# Patient Record
Sex: FEMALE | Race: WHITE | NOT HISPANIC OR LATINO | Employment: OTHER | RURAL
[De-identification: names, ages, dates, MRNs, and addresses within clinical notes are randomized per-mention and may not be internally consistent; named-entity substitution may affect disease eponyms.]

---

## 2017-03-16 ENCOUNTER — HISTORICAL (OUTPATIENT)
Dept: ADMINISTRATIVE | Facility: HOSPITAL | Age: 37
End: 2017-03-16

## 2017-03-20 LAB
LAB AP CLINICAL INFORMATION: NORMAL
LAB AP GENERAL CAT - HISTORICAL: NORMAL
LAB AP INTERPRETATION/RESULT - HISTORICAL: NEGATIVE
LAB AP SPECIMEN ADEQUACY - HISTORICAL: NORMAL
LAB AP SPECIMEN SUBMITTED - HISTORICAL: NORMAL

## 2022-10-07 ENCOUNTER — OFFICE VISIT (OUTPATIENT)
Dept: FAMILY MEDICINE | Facility: CLINIC | Age: 42
End: 2022-10-07
Payer: OTHER GOVERNMENT

## 2022-10-07 VITALS
SYSTOLIC BLOOD PRESSURE: 125 MMHG | BODY MASS INDEX: 36.04 KG/M2 | OXYGEN SATURATION: 97 % | HEART RATE: 103 BPM | RESPIRATION RATE: 18 BRPM | WEIGHT: 237.81 LBS | HEIGHT: 68 IN | DIASTOLIC BLOOD PRESSURE: 68 MMHG

## 2022-10-07 DIAGNOSIS — M48.00 SPINAL STENOSIS, UNSPECIFIED SPINAL REGION: Primary | ICD-10-CM

## 2022-10-07 DIAGNOSIS — M48.07 SPINAL STENOSIS, LUMBOSACRAL REGION: ICD-10-CM

## 2022-10-07 PROCEDURE — 96372 THER/PROPH/DIAG INJ SC/IM: CPT | Mod: ,,, | Performed by: FAMILY MEDICINE

## 2022-10-07 PROCEDURE — 99213 PR OFFICE/OUTPT VISIT, EST, LEVL III, 20-29 MIN: ICD-10-PCS | Mod: 25,,, | Performed by: FAMILY MEDICINE

## 2022-10-07 PROCEDURE — 96372 PR INJECTION,THERAP/PROPH/DIAG2ST, IM OR SUBCUT: ICD-10-PCS | Mod: ,,, | Performed by: FAMILY MEDICINE

## 2022-10-07 PROCEDURE — 99213 OFFICE O/P EST LOW 20 MIN: CPT | Mod: 25,,, | Performed by: FAMILY MEDICINE

## 2022-10-07 RX ORDER — DEXAMETHASONE SODIUM PHOSPHATE 4 MG/ML
8 INJECTION, SOLUTION INTRA-ARTICULAR; INTRALESIONAL; INTRAMUSCULAR; INTRAVENOUS; SOFT TISSUE
Status: COMPLETED | OUTPATIENT
Start: 2022-10-07 | End: 2022-10-07

## 2022-10-07 RX ADMIN — DEXAMETHASONE SODIUM PHOSPHATE 8 MG: 4 INJECTION, SOLUTION INTRA-ARTICULAR; INTRALESIONAL; INTRAMUSCULAR; INTRAVENOUS; SOFT TISSUE at 10:10

## 2022-10-07 NOTE — PROGRESS NOTES
Jamari Stout MD        PATIENT NAME: Kaylene Pryor  : 1980  DATE: 10/7/22  MRN: [id number]      Billing Provider: Jamari Stout MD  Level of Service: [place] OFFICE/OUTPT VISIT, EST, LEVL III, 20-29 MIN  Patient PCP Information       Provider PCP Type    Jamari Stout MD General            Reason for Visit / Chief Complaint: Back Pain (Patient want an MRI referral/Also Dr. Cates wants her to have a steroid shot)       Update PCP  Update Chief Complaint         History of Present Illness / Problem Focused Workflow     Kaylene Pryor presents to the clinic with Back Pain (Patient want an MRI referral/Also Dr. Cates wants her to have a steroid shot)     Long hx low back pain.  Feels better bending forward either sitting or standing.      Back Pain  This is a recurrent problem. The current episode started 1 to 4 weeks ago. The problem occurs constantly. The problem has been gradually worsening since onset. The pain is present in the gluteal, lumbar spine and sacro-iliac. The quality of the pain is described as shooting and stabbing. The pain radiates to the left thigh and right thigh. The pain is at a severity of 7/10. The pain is severe. The pain is Worse during the day. The symptoms are aggravated by bending, coughing, position, sitting, standing and twisting. Stiffness is present All day. Associated symptoms include headaches, leg pain, pelvic pain and weakness. Pertinent negatives include no abdominal pain, bladder incontinence, bowel incontinence, chest pain, dysuria, fever, numbness, paresis, paresthesias, perianal numbness, tingling or weight loss. Risk factors include lack of exercise, obesity and poor posture. The treatment provided mild relief.     Review of Systems     Review of Systems   Constitutional:  Negative for activity change, appetite change, fever, unexpected weight change and weight loss.   HENT:  Negative for congestion, rhinorrhea, sinus pressure, sinus pain, sore throat and trouble  swallowing.    Eyes:  Negative for photophobia, pain, discharge and visual disturbance.   Respiratory:  Negative for cough, chest tightness, wheezing and stridor.    Cardiovascular:  Negative for chest pain, palpitations and leg swelling.   Gastrointestinal:  Negative for abdominal pain, blood in stool, bowel incontinence, constipation, diarrhea and nausea.   Endocrine: Negative for polydipsia, polyphagia and polyuria.   Genitourinary:  Positive for pelvic pain. Negative for bladder incontinence, difficulty urinating, dysuria, flank pain and hematuria.   Musculoskeletal:  Positive for back pain. Negative for arthralgias and neck pain.   Skin:  Negative for rash.   Allergic/Immunologic: Negative for food allergies.   Neurological:  Positive for weakness and headaches. Negative for dizziness, tingling, tremors, seizures, syncope, numbness and paresthesias.   Psychiatric/Behavioral:  Negative for behavioral problems, confusion, decreased concentration, dysphoric mood and hallucinations. The patient is not nervous/anxious.       Medical / Social / Family History   History reviewed. No pertinent past medical history.    History reviewed. No pertinent surgical history.    Social History  Ms.      Family History  Ms.'s family history is not on file.    Medications and Allergies     Medications  No outpatient medications have been marked as taking for the 10/7/22 encounter (Office Visit) with Jamari Stout MD.       Allergies  Review of patient's allergies indicates:  No Known Allergies    Physical Examination     Vitals:    10/07/22 0951   BP: 125/68   Pulse: 103   Resp: 18     Physical Exam  Constitutional:       General: She is not in acute distress.     Appearance: Normal appearance.   HENT:      Head: Normocephalic.      Right Ear: Tympanic membrane and ear canal normal.      Left Ear: Tympanic membrane and ear canal normal.      Nose: Nose normal.      Mouth/Throat:      Mouth: Mucous membranes are moist.       Pharynx: No oropharyngeal exudate.   Eyes:      Extraocular Movements: Extraocular movements intact.      Pupils: Pupils are equal, round, and reactive to light.   Cardiovascular:      Rate and Rhythm: Normal rate and regular rhythm.      Heart sounds: No murmur heard.  Pulmonary:      Effort: Pulmonary effort is normal.      Breath sounds: Normal breath sounds. No wheezing.   Abdominal:      General: Abdomen is flat. Bowel sounds are normal.      Palpations: Abdomen is soft.      Hernia: No hernia is present.   Musculoskeletal:         General: Normal range of motion.      Cervical back: Normal range of motion and neck supple.      Right lower leg: No edema.      Left lower leg: No edema.      Comments: Arises stiffly from a seated position.  Prefers the kyphosis position   Lymphadenopathy:      Cervical: No cervical adenopathy.   Skin:     General: Skin is warm and dry.      Coloration: Skin is not jaundiced.      Findings: No lesion.   Neurological:      General: No focal deficit present.      Mental Status: She is alert and oriented to person, place, and time.      Cranial Nerves: No cranial nerve deficit.      Gait: Gait normal.   Psychiatric:         Mood and Affect: Mood normal.         Behavior: Behavior normal.         Judgment: Judgment normal.        Assessment and Plan (including Health Maintenance)      Problem List  Smart Sets  Document Outside HM   :    Plan:   Spinal stenosis, unspecified spinal region  -     dexamethasone injection 8 mg  -     MRI Lumbar Spine Without Contrast; Future; Expected date: 10/07/2022    Spinal stenosis, lumbosacral region  -     MRI Lumbar Spine Without Contrast; Future; Expected date: 10/07/2022  -     Ambulatory referral/consult to Neurosurgery; Future; Expected date: 10/21/2022         Health Maintenance Due   Topic Date Due    Hepatitis C Screening  Never done    Cervical Cancer Screening  Never done    Lipid Panel  Never done    COVID-19 Vaccine (1) Never done    HIV  Screening  Never done    TETANUS VACCINE  Never done    Mammogram  Never done    Influenza Vaccine (1) Never done       Problem List Items Addressed This Visit    None  Visit Diagnoses       Spinal stenosis, unspecified spinal region    -  Primary    Relevant Medications    dexamethasone injection 8 mg (Completed)    Other Relevant Orders    MRI Lumbar Spine Without Contrast    Spinal stenosis, lumbosacral region        Relevant Orders    MRI Lumbar Spine Without Contrast    Ambulatory referral/consult to Neurosurgery            The patient has no Health Maintenance topics of status Not Due    Future Appointments   Date Time Provider Department Center   10/21/2022  2:30 PM [facility] MRI[address] [place] [address]        There are no Patient Instructions on file for this visit.  Follow up if symptoms worsen or fail to improve.     Signature:  Jamari Stout MD      Date of encounter: 10/7/22    Answers submitted by the patient for this visit:  Back Pain Questionnaire (Submitted on 10/6/2022)  Chief Complaint: Back pain  genital pain: No

## 2023-07-11 ENCOUNTER — OFFICE VISIT (OUTPATIENT)
Dept: FAMILY MEDICINE | Facility: CLINIC | Age: 43
End: 2023-07-11
Payer: OTHER GOVERNMENT

## 2023-07-11 VITALS
DIASTOLIC BLOOD PRESSURE: 100 MMHG | SYSTOLIC BLOOD PRESSURE: 150 MMHG | HEIGHT: 68 IN | WEIGHT: 250 LBS | BODY MASS INDEX: 37.89 KG/M2 | HEART RATE: 84 BPM | RESPIRATION RATE: 18 BRPM | OXYGEN SATURATION: 96 %

## 2023-07-11 DIAGNOSIS — Z78.0 MENOPAUSE: ICD-10-CM

## 2023-07-11 DIAGNOSIS — Z00.00 ROUTINE GENERAL MEDICAL EXAMINATION AT A HEALTH CARE FACILITY: ICD-10-CM

## 2023-07-11 DIAGNOSIS — I10 PRIMARY HYPERTENSION: Primary | ICD-10-CM

## 2023-07-11 DIAGNOSIS — Z12.39 BREAST SCREENING: ICD-10-CM

## 2023-07-11 LAB
ALBUMIN SERPL BCP-MCNC: 3.7 G/DL (ref 3.5–5)
ALBUMIN/GLOB SERPL: 0.9 {RATIO}
ALP SERPL-CCNC: 47 U/L (ref 37–98)
ALT SERPL W P-5'-P-CCNC: 52 U/L (ref 13–56)
ANION GAP SERPL CALCULATED.3IONS-SCNC: 11 MMOL/L (ref 7–16)
AST SERPL W P-5'-P-CCNC: 36 U/L (ref 15–37)
BASOPHILS # BLD AUTO: 0.09 K/UL (ref 0–0.2)
BASOPHILS NFR BLD AUTO: 0.8 % (ref 0–1)
BILIRUB SERPL-MCNC: 0.6 MG/DL (ref ?–1.2)
BUN SERPL-MCNC: 12 MG/DL (ref 7–18)
BUN/CREAT SERPL: 18 (ref 6–20)
CALCIUM SERPL-MCNC: 9.2 MG/DL (ref 8.5–10.1)
CHLORIDE SERPL-SCNC: 102 MMOL/L (ref 98–107)
CHOLEST SERPL-MCNC: 157 MG/DL (ref 0–200)
CHOLEST/HDLC SERPL: 4.1 {RATIO}
CO2 SERPL-SCNC: 28 MMOL/L (ref 21–32)
CREAT SERPL-MCNC: 0.68 MG/DL (ref 0.55–1.02)
DIFFERENTIAL METHOD BLD: ABNORMAL
EGFR (NO RACE VARIABLE) (RUSH/TITUS): 110 ML/MIN/1.73M2
EOSINOPHIL # BLD AUTO: 0.89 K/UL (ref 0–0.5)
EOSINOPHIL NFR BLD AUTO: 7.5 % (ref 1–4)
ERYTHROCYTE [DISTWIDTH] IN BLOOD BY AUTOMATED COUNT: 14.5 % (ref 11.5–14.5)
ESTRADIOL SERPL-MCNC: 254.4 PG/ML
FSH SERPL-ACNC: 0.8 MIU/ML (ref 1.7–134.8)
GLOBULIN SER-MCNC: 3.9 G/DL (ref 2–4)
GLUCOSE SERPL-MCNC: 90 MG/DL (ref 74–106)
HCT VFR BLD AUTO: 44 % (ref 38–47)
HDLC SERPL-MCNC: 38 MG/DL (ref 40–60)
HGB BLD-MCNC: 14.1 G/DL (ref 12–16)
IMM GRANULOCYTES # BLD AUTO: 0.03 K/UL (ref 0–0.04)
IMM GRANULOCYTES NFR BLD: 0.3 % (ref 0–0.4)
LDLC SERPL CALC-MCNC: 88 MG/DL
LDLC/HDLC SERPL: 2.3 {RATIO}
LH SERPL-ACNC: 2.5 MIU/ML
LYMPHOCYTES # BLD AUTO: 2.65 K/UL (ref 1–4.8)
LYMPHOCYTES NFR BLD AUTO: 22.2 % (ref 27–41)
MCH RBC QN AUTO: 28.6 PG (ref 27–31)
MCHC RBC AUTO-ENTMCNC: 32 G/DL (ref 32–36)
MCV RBC AUTO: 89.2 FL (ref 80–96)
MONOCYTES # BLD AUTO: 0.95 K/UL (ref 0–0.8)
MONOCYTES NFR BLD AUTO: 8 % (ref 2–6)
MPC BLD CALC-MCNC: 10.8 FL (ref 9.4–12.4)
NEUTROPHILS # BLD AUTO: 7.32 K/UL (ref 1.8–7.7)
NEUTROPHILS NFR BLD AUTO: 61.2 % (ref 53–65)
NONHDLC SERPL-MCNC: 119 MG/DL
NRBC # BLD AUTO: 0 X10E3/UL
NRBC, AUTO (.00): 0 %
PLATELET # BLD AUTO: 296 K/UL (ref 150–400)
POTASSIUM SERPL-SCNC: 3.8 MMOL/L (ref 3.5–5.1)
PROT SERPL-MCNC: 7.6 G/DL (ref 6.4–8.2)
RBC # BLD AUTO: 4.93 M/UL (ref 4.2–5.4)
SODIUM SERPL-SCNC: 137 MMOL/L (ref 136–145)
TRIGL SERPL-MCNC: 157 MG/DL (ref 35–150)
VLDLC SERPL-MCNC: 31 MG/DL
WBC # BLD AUTO: 11.93 K/UL (ref 4.5–11)

## 2023-07-11 PROCEDURE — 80053 COMPREHENSIVE METABOLIC PANEL: ICD-10-PCS | Mod: ,,, | Performed by: CLINICAL MEDICAL LABORATORY

## 2023-07-11 PROCEDURE — 99396 PR PREVENTIVE VISIT,EST,40-64: ICD-10-PCS | Mod: ,,, | Performed by: FAMILY MEDICINE

## 2023-07-11 PROCEDURE — 82670 ESTRADIOL: ICD-10-PCS | Mod: ,,, | Performed by: CLINICAL MEDICAL LABORATORY

## 2023-07-11 PROCEDURE — 80061 LIPID PANEL: ICD-10-PCS | Mod: ,,, | Performed by: CLINICAL MEDICAL LABORATORY

## 2023-07-11 PROCEDURE — 80053 COMPREHEN METABOLIC PANEL: CPT | Mod: ,,, | Performed by: CLINICAL MEDICAL LABORATORY

## 2023-07-11 PROCEDURE — 83002 ASSAY OF GONADOTROPIN (LH): CPT | Mod: ,,, | Performed by: CLINICAL MEDICAL LABORATORY

## 2023-07-11 PROCEDURE — 82670 ASSAY OF TOTAL ESTRADIOL: CPT | Mod: ,,, | Performed by: CLINICAL MEDICAL LABORATORY

## 2023-07-11 PROCEDURE — 83001 FOLLICLE STIMULATING HORMONE: ICD-10-PCS | Mod: ,,, | Performed by: CLINICAL MEDICAL LABORATORY

## 2023-07-11 PROCEDURE — 83001 ASSAY OF GONADOTROPIN (FSH): CPT | Mod: ,,, | Performed by: CLINICAL MEDICAL LABORATORY

## 2023-07-11 PROCEDURE — 80061 LIPID PANEL: CPT | Mod: ,,, | Performed by: CLINICAL MEDICAL LABORATORY

## 2023-07-11 PROCEDURE — 85025 COMPLETE CBC W/AUTO DIFF WBC: CPT | Mod: ,,, | Performed by: CLINICAL MEDICAL LABORATORY

## 2023-07-11 PROCEDURE — 83002 LUTEINIZING HORMONE: ICD-10-PCS | Mod: ,,, | Performed by: CLINICAL MEDICAL LABORATORY

## 2023-07-11 PROCEDURE — 85025 CBC WITH DIFFERENTIAL: ICD-10-PCS | Mod: ,,, | Performed by: CLINICAL MEDICAL LABORATORY

## 2023-07-11 PROCEDURE — 99396 PREV VISIT EST AGE 40-64: CPT | Mod: ,,, | Performed by: FAMILY MEDICINE

## 2023-07-11 RX ORDER — LISINOPRIL 10 MG/1
10 TABLET ORAL DAILY
Qty: 90 TABLET | Refills: 3 | Status: SHIPPED | OUTPATIENT
Start: 2023-07-11 | End: 2023-07-28 | Stop reason: ALTCHOICE

## 2023-07-11 NOTE — PROGRESS NOTES
Jamari Stout MD        PATIENT NAME: Kaylene Pryor  : 1980  DATE: 23  MRN: [id number]      Billing Provider: Jamari Stout MD  Level of Service: [place] OFFICE/OUTPT VISIT, EST, LEVL IV, 30-39 MIN  Patient PCP Information       Provider PCP Type    Jamari Stout MD General            Reason for Visit / Chief Complaint: Annual Exam (Patient states she has not seen a PCP in years and wanted to have labs done to see if she is having issues with hormones. Having irregular periods)       Update PCP  Update Chief Complaint         History of Present Illness / Problem Focused Workflow     Kaylene Pryor presents to the clinic with Annual Exam (Patient states she has not seen a PCP in years and wanted to have labs done to see if she is having issues with hormones. Having irregular periods)     Routine followup.  No significant interval change.  Hot flashes.      Review of Systems     Review of Systems   Constitutional:  Negative for activity change, appetite change, fever and unexpected weight change.   HENT:  Negative for congestion, hearing loss, rhinorrhea, sinus pressure, sinus pain, sore throat and trouble swallowing.    Eyes:  Negative for photophobia, pain, discharge and visual disturbance.   Respiratory:  Negative for cough, chest tightness, wheezing and stridor.    Cardiovascular:  Positive for palpitations. Negative for chest pain and leg swelling.   Gastrointestinal:  Positive for diarrhea. Negative for abdominal pain, blood in stool, constipation, nausea and vomiting.   Endocrine: Negative for polydipsia, polyphagia and polyuria.   Genitourinary:  Positive for menstrual problem. Negative for difficulty urinating, dysuria, flank pain and hematuria.   Musculoskeletal:  Positive for arthralgias and neck pain. Negative for joint swelling.   Skin:  Negative for rash.   Allergic/Immunologic: Negative for food allergies.   Neurological:  Positive for headaches. Negative for dizziness, tremors, seizures,  syncope and weakness.   Psychiatric/Behavioral:  Negative for behavioral problems, confusion, decreased concentration, dysphoric mood and hallucinations. The patient is not nervous/anxious.       Medical / Social / Family History   History reviewed. No pertinent past medical history.    History reviewed. No pertinent surgical history.    Social History  Ms.  reports that she has never smoked. She has never been exposed to tobacco smoke. She has never used smokeless tobacco. She reports that she does not currently use alcohol. She reports that she does not use drugs.    Family History  Ms.'s family history is not on file.    Medications and Allergies     Medications  No outpatient medications have been marked as taking for the 7/11/23 encounter (Office Visit) with Jamari Stout MD.       Allergies  Review of patient's allergies indicates:   Allergen Reactions    Opioids - morphine analogues        Physical Examination     Vitals:    07/11/23 1439   BP: (!) 150/100   Pulse:    Resp:      Physical Exam  Constitutional:       General: She is not in acute distress.     Appearance: Normal appearance.   HENT:      Head: Normocephalic.      Right Ear: Tympanic membrane and ear canal normal.      Left Ear: Tympanic membrane and ear canal normal.      Nose: Nose normal.      Mouth/Throat:      Mouth: Mucous membranes are moist.      Pharynx: No oropharyngeal exudate.   Eyes:      Extraocular Movements: Extraocular movements intact.      Pupils: Pupils are equal, round, and reactive to light.   Cardiovascular:      Rate and Rhythm: Normal rate and regular rhythm.      Heart sounds: No murmur heard.  Pulmonary:      Effort: Pulmonary effort is normal.      Breath sounds: Normal breath sounds. No wheezing.   Abdominal:      General: Abdomen is flat. Bowel sounds are normal.      Palpations: Abdomen is soft.      Hernia: No hernia is present.   Musculoskeletal:         General: Normal range of motion.      Cervical back:  Normal range of motion and neck supple.      Right lower leg: No edema.      Left lower leg: No edema.   Lymphadenopathy:      Cervical: No cervical adenopathy.   Skin:     General: Skin is warm and dry.      Coloration: Skin is not jaundiced.      Findings: No lesion.   Neurological:      General: No focal deficit present.      Mental Status: She is alert and oriented to person, place, and time.      Cranial Nerves: No cranial nerve deficit.      Gait: Gait normal.   Psychiatric:         Mood and Affect: Mood normal.         Behavior: Behavior normal.         Judgment: Judgment normal.        Assessment and Plan (including Health Maintenance)      Problem List  Smart Sets  Document Outside HM   :    Plan:     1. Routine general medical examination at a health care facility    -     CBC Auto Differential  -     Comprehensive Metabolic Panel  -     Lipid Panel          Health Maintenance Due   Topic Date Due    Cervical Cancer Screening  Never done    HIV Screening  Never done    TETANUS VACCINE  Never done    Mammogram  Never done       Problem List Items Addressed This Visit    None  Visit Diagnoses       Primary hypertension    -  Primary    Relevant Medications    lisinopriL 10 MG tablet    Routine general medical examination at a health care facility        Relevant Orders    CBC Auto Differential (Completed)    Comprehensive Metabolic Panel (Completed)    Lipid Panel (Completed)    Menopause        Relevant Orders    Follicle Stimulating Hormone (Completed)    Luteinizing Hormone (Completed)    Estradiol (Completed)    Breast screening        Relevant Orders    Mammo Digital Screening Bilat            Health Maintenance Topics with due status: Not Due       Topic Last Completion Date    Influenza Vaccine Not Due       Future Appointments   Date Time Provider Department Center   8/14/2023  2:00 PM Jamari Stout MD [facility]   8/21/2023 11:15 AM Day Abdi CNM [facility]    9/28/2023 12:45 PM [facility] [place]        There are no Patient Instructions on file for this visit.  Follow up in about 4 weeks (around 8/8/2023) for routine followup.     Signature:  Jamari Stout MD      Date of encounter: 7/11/23

## 2023-07-12 ENCOUNTER — PATIENT MESSAGE (OUTPATIENT)
Dept: FAMILY MEDICINE | Facility: CLINIC | Age: 43
End: 2023-07-12
Payer: OTHER GOVERNMENT

## 2023-07-20 ENCOUNTER — HOSPITAL ENCOUNTER (EMERGENCY)
Facility: HOSPITAL | Age: 43
Discharge: HOME | End: 2023-07-20
Payer: OTHER GOVERNMENT

## 2023-07-20 VITALS
DIASTOLIC BLOOD PRESSURE: 107 MMHG | SYSTOLIC BLOOD PRESSURE: 136 MMHG | TEMPERATURE: 98 F | WEIGHT: 250 LBS | HEART RATE: 90 BPM | BODY MASS INDEX: 37.89 KG/M2 | RESPIRATION RATE: 18 BRPM | HEIGHT: 68 IN | OXYGEN SATURATION: 100 %

## 2023-07-20 DIAGNOSIS — N83.201 RIGHT OVARIAN CYST: ICD-10-CM

## 2023-07-20 DIAGNOSIS — D25.9 UTERINE LEIOMYOMA, UNSPECIFIED LOCATION: ICD-10-CM

## 2023-07-20 DIAGNOSIS — R10.9 ABDOMINAL PAIN, UNSPECIFIED ABDOMINAL LOCATION: Primary | ICD-10-CM

## 2023-07-20 LAB
ALBUMIN SERPL BCP-MCNC: 3.3 G/DL (ref 3.5–5)
ALBUMIN/GLOB SERPL: 0.9 {RATIO}
ALP SERPL-CCNC: 60 U/L (ref 37–98)
ALT SERPL W P-5'-P-CCNC: 24 U/L (ref 13–56)
ANION GAP SERPL CALCULATED.3IONS-SCNC: 10 MMOL/L (ref 7–16)
AST SERPL W P-5'-P-CCNC: 14 U/L (ref 15–37)
BACTERIA #/AREA URNS HPF: ABNORMAL /HPF
BASOPHILS # BLD AUTO: 0.09 K/UL (ref 0–0.2)
BASOPHILS NFR BLD AUTO: 0.6 % (ref 0–1)
BILIRUB SERPL-MCNC: 0.3 MG/DL (ref ?–1.2)
BILIRUB UR QL STRIP: NEGATIVE
BUN SERPL-MCNC: 13 MG/DL (ref 7–18)
BUN/CREAT SERPL: 16 (ref 6–20)
CALCIUM SERPL-MCNC: 9.1 MG/DL (ref 8.5–10.1)
CHLORIDE SERPL-SCNC: 100 MMOL/L (ref 98–107)
CLARITY UR: CLEAR
CO2 SERPL-SCNC: 30 MMOL/L (ref 21–32)
COLOR UR: COLORLESS
CREAT SERPL-MCNC: 0.83 MG/DL (ref 0.55–1.02)
DIFFERENTIAL METHOD BLD: ABNORMAL
EGFR (NO RACE VARIABLE) (RUSH/TITUS): 89 ML/MIN/1.73M2
EOSINOPHIL # BLD AUTO: 0.47 K/UL (ref 0–0.5)
EOSINOPHIL NFR BLD AUTO: 3.2 % (ref 1–4)
ERYTHROCYTE [DISTWIDTH] IN BLOOD BY AUTOMATED COUNT: 13.6 % (ref 11.5–14.5)
GLOBULIN SER-MCNC: 3.6 G/DL (ref 2–4)
GLUCOSE SERPL-MCNC: 130 MG/DL (ref 74–106)
GLUCOSE UR STRIP-MCNC: NORMAL MG/DL
HCT VFR BLD AUTO: 41.5 % (ref 38–47)
HGB BLD-MCNC: 14.1 G/DL (ref 12–16)
IMM GRANULOCYTES # BLD AUTO: 0.06 K/UL (ref 0–0.04)
IMM GRANULOCYTES NFR BLD: 0.4 % (ref 0–0.4)
KETONES UR STRIP-SCNC: NEGATIVE MG/DL
LEUKOCYTE ESTERASE UR QL STRIP: ABNORMAL
LYMPHOCYTES # BLD AUTO: 1.91 K/UL (ref 1–4.8)
LYMPHOCYTES NFR BLD AUTO: 12.9 % (ref 27–41)
MCH RBC QN AUTO: 30 PG (ref 27–31)
MCHC RBC AUTO-ENTMCNC: 34 G/DL (ref 32–36)
MCV RBC AUTO: 88.3 FL (ref 80–96)
MONOCYTES # BLD AUTO: 0.71 K/UL (ref 0–0.8)
MONOCYTES NFR BLD AUTO: 4.8 % (ref 2–6)
MPC BLD CALC-MCNC: 10.3 FL (ref 9.4–12.4)
MUCOUS THREADS #/AREA URNS HPF: ABNORMAL /HPF
NEUTROPHILS # BLD AUTO: 11.59 K/UL (ref 1.8–7.7)
NEUTROPHILS NFR BLD AUTO: 78.1 % (ref 53–65)
NITRITE UR QL STRIP: NEGATIVE
NRBC # BLD AUTO: 0 X10E3/UL
NRBC, AUTO (.00): 0 %
PH UR STRIP: 7 PH UNITS
PLATELET # BLD AUTO: 255 K/UL (ref 150–400)
POTASSIUM SERPL-SCNC: 3.9 MMOL/L (ref 3.5–5.1)
PROT SERPL-MCNC: 6.9 G/DL (ref 6.4–8.2)
PROT UR QL STRIP: NEGATIVE
RBC # BLD AUTO: 4.7 M/UL (ref 4.2–5.4)
RBC # UR STRIP: ABNORMAL /UL
RBC #/AREA URNS HPF: ABNORMAL /HPF
SODIUM SERPL-SCNC: 136 MMOL/L (ref 136–145)
SP GR UR STRIP: 1.01
SQUAMOUS #/AREA URNS LPF: ABNORMAL /LPF
TRICHOMONAS #/AREA URNS HPF: ABNORMAL /HPF
UROBILINOGEN UR STRIP-ACNC: NORMAL MG/DL
WBC # BLD AUTO: 14.83 K/UL (ref 4.5–11)
WBC #/AREA URNS HPF: ABNORMAL /HPF
YEAST #/AREA URNS HPF: ABNORMAL /HPF

## 2023-07-20 PROCEDURE — 80053 COMPREHEN METABOLIC PANEL: CPT | Performed by: NURSE PRACTITIONER

## 2023-07-20 PROCEDURE — 99284 EMERGENCY DEPT VISIT MOD MDM: CPT | Mod: 25

## 2023-07-20 PROCEDURE — 99284 EMERGENCY DEPT VISIT MOD MDM: CPT | Mod: ,,, | Performed by: NURSE PRACTITIONER

## 2023-07-20 PROCEDURE — 85025 COMPLETE CBC W/AUTO DIFF WBC: CPT | Performed by: NURSE PRACTITIONER

## 2023-07-20 PROCEDURE — 99284 PR EMERGENCY DEPT VISIT,LEVEL IV: ICD-10-PCS | Mod: ,,, | Performed by: NURSE PRACTITIONER

## 2023-07-20 PROCEDURE — 81001 URINALYSIS AUTO W/SCOPE: CPT | Performed by: NURSE PRACTITIONER

## 2023-07-20 RX ORDER — MEDROXYPROGESTERONE ACETATE 5 MG/1
5 TABLET ORAL DAILY
Qty: 7 TABLET | Refills: 0 | Status: SHIPPED | OUTPATIENT
Start: 2023-07-20 | End: 2023-08-10

## 2023-07-20 RX ORDER — IBUPROFEN 800 MG/1
800 TABLET ORAL EVERY 6 HOURS PRN
Qty: 20 TABLET | Refills: 0 | Status: SHIPPED | OUTPATIENT
Start: 2023-07-20 | End: 2023-08-21

## 2023-07-21 ENCOUNTER — TELEPHONE (OUTPATIENT)
Dept: EMERGENCY MEDICINE | Facility: HOSPITAL | Age: 43
End: 2023-07-21
Payer: OTHER GOVERNMENT

## 2023-07-21 NOTE — ED PROVIDER NOTES
Encounter Date: 2023       History     Chief Complaint   Patient presents with    Abdominal Pain     Abd pain/cramps since 4 pm today. Reports intermittently spotting/vaginal bleeding for 2 weeks. Negative home pregnancy test x2.      42-year-old female presents to ED with complaint of abdominal pain and cramping.  Patient states she did not have a cycle 1 month prior and started bleeding this month approximately 2 weeks ago.  Patient states she was having light intermittent episodes of bleeding over the course of 2 weeks.  Patient states at approximately 4:00 p.m. today she started having intense abdominal pain and cramps to lower abdomen.  She states while in waiting room, she had to use the bathroom and passed several large clots.  Patient states initial pain was approximately 8-9 on a scale of 0-10; patient's current pain level is 4-5 on 0-10 scale.  Patient reports previous lab work for hormone level check and reports FSH was low.  Patient states she took 2 home pregnancy test last week that were negative.  Denies urinary symptoms to include frequency, urgency, or dysuria.    The history is provided by the patient. No  was used.   Review of patient's allergies indicates:   Allergen Reactions    Opioids - morphine analogues Nausea And Vomiting     History reviewed. No pertinent past medical history.  Past Surgical History:   Procedure Laterality Date     SECTION      CHOLECYSTECTOMY      LIPOSUCTION OF ABDOMEN      TONSILLECTOMY       History reviewed. No pertinent family history.  Social History     Tobacco Use    Smoking status: Never     Passive exposure: Never    Smokeless tobacco: Never   Substance Use Topics    Alcohol use: Not Currently    Drug use: Never     Review of Systems   Constitutional:  Negative for chills and fever.   HENT:  Negative for sinus pressure and sinus pain.    Eyes:  Negative for photophobia and visual disturbance.   Respiratory:  Negative for cough  and shortness of breath.    Cardiovascular:  Negative for chest pain and palpitations.   Gastrointestinal:  Positive for abdominal pain. Negative for nausea and vomiting.   Endocrine: Negative for cold intolerance and heat intolerance.   Genitourinary:  Positive for pelvic pain and vaginal bleeding.   Musculoskeletal:  Positive for back pain. Negative for arthralgias.   Skin:  Negative for color change and wound.   Allergic/Immunologic: Negative for environmental allergies and food allergies.   Neurological:  Negative for dizziness and weakness.   Hematological:  Negative for adenopathy. Does not bruise/bleed easily.   Psychiatric/Behavioral:  Negative for agitation and confusion.      Physical Exam     Initial Vitals [07/20/23 2027]   BP Pulse Resp Temp SpO2   (!) 136/107 90 18 98.3 °F (36.8 °C) 100 %      MAP       --         Physical Exam    Nursing note and vitals reviewed.  Constitutional: She appears well-developed and well-nourished.   HENT:   Head: Normocephalic and atraumatic.   Eyes: EOM are normal. Pupils are equal, round, and reactive to light.   Neck: Neck supple.   Normal range of motion.  Cardiovascular:  Normal rate and regular rhythm.           No murmur heard.  Pulmonary/Chest: She has no wheezes. She has no rhonchi.   Abdominal: Abdomen is soft. She exhibits no distension. There is abdominal tenderness in the suprapubic area and left lower quadrant.   Musculoskeletal:         General: No tenderness or edema.      Cervical back: Normal range of motion and neck supple.     Lymphadenopathy:     She has no cervical adenopathy.   Neurological: She is alert and oriented to person, place, and time. No cranial nerve deficit or sensory deficit.   Skin: Skin is warm and dry. Capillary refill takes less than 2 seconds.   Psychiatric: She has a normal mood and affect. Thought content normal.       Medical Screening Exam   See Full Note    ED Course   Procedures  Labs Reviewed   COMPREHENSIVE METABOLIC PANEL -  Abnormal; Notable for the following components:       Result Value    Glucose 130 (*)     Albumin 3.3 (*)     AST 14 (*)     All other components within normal limits   URINALYSIS, REFLEX TO URINE CULTURE - Abnormal; Notable for the following components:    Leukocytes, UA Trace (*)     Blood, UA Large (*)     All other components within normal limits   CBC WITH DIFFERENTIAL - Abnormal; Notable for the following components:    WBC 14.83 (*)     Neutrophils % 78.1 (*)     Lymphocytes % 12.9 (*)     Neutrophils, Abs 11.59 (*)     Immature Granulocytes, Absolute 0.06 (*)     All other components within normal limits   URINALYSIS, MICROSCOPIC - Abnormal; Notable for the following components:    RBC, UA 10-15 (*)     All other components within normal limits   CBC W/ AUTO DIFFERENTIAL    Narrative:     The following orders were created for panel order CBC auto differential.  Procedure                               Abnormality         Status                     ---------                               -----------         ------                     CBC with Differential[[id number]]        Abnormal            Final result                 Please view results for these tests on the individual orders.   EXTRA TUBES    Narrative:     The following orders were created for panel order EXTRA TUBES.  Procedure                               Abnormality         Status                     ---------                               -----------         ------                     Light Blue Top Hold[[id number]]                              In process                 Gold Top Hold[[id number]]                                    In process                   Please view results for these tests on the individual orders.   LIGHT BLUE TOP HOLD   GOLD TOP HOLD          Imaging Results              US Pelvis Complete Non OB (Final result)  Result time 07/21/23 07:44:05      Final result by Baljeet Castro DO (07/21/23 07:44:05)                    Impression:      Limited exam due to lack of transvaginal imaging.    Endometrial stripe is normal in thickness.  Small volume fluid within the endometrial canal.    Enlarged fibroid uterus.    Real time ultrasound images were captured and stored.      Electronically signed by: Baljeet Castro  Date:    07/21/2023  Time:    07:44               Narrative:    EXAMINATION:  US PELVIS COMPLETE NON OB    DATE AND TIME OF EXAMINATION: 07:42    CLINICAL HISTORY:  DUB;    TECHNIQUE:  Multiple longitudinal and transverse real-time sonographic images of the pelvis are obtained using a transabdominal transducer.    COMPARISON:  None.    FINDINGS:  The uterus measures 12 x 7 x 6 cm.  This possible fibroid arise from the uterus measuring up to 4 cm.  The endometrial stripe measures 0.2 cm in thickness.  Small volume fluid within the endometrial canal.    The right ovary measures 5 x 4 x 3 cm and the left ovary measures 3 x 3.4 x 1.8 cm. No worrisome adnexal mass is identified. No significant free fluid is demonstrated.  Normal doppler flow to the ovaries.                                       RADIOLOGY REPORT (Final result)  Result time 07/25/23 11:20:04      Final result by Unknown User (07/25/23 11:20:04)                                         Medications - No data to display  Medical Decision Making:   Initial Assessment:   Abdominal cramping  Abdominal pain  Heavy bleeding  Differential Diagnosis:   Uterine fibroids  Ovarian cysts  DUB  Clinical Tests:   Lab Tests: Ordered and Reviewed  Radiological Study: Ordered and Reviewed  ED Management:  MDM    Patient presents for emergent evaluation of acute abdominal pain, cramping that poses a threat to life and/or bodily function.    In the ED patient found to have acute uterine fibroid, right ovarian cyst.    I ordered labs and personally reviewed them.  Labs significant for WBC 14.83; trace leukocytes, large blood  US preliminary read of uterine fibroid, right ovarian cyst,  mildly thickened endometrial stripe    Discharge MDM  Patient was discharged in stable condition.  Detailed return precautions discussed.                         Clinical Impression:   Final diagnoses:  [R10.9] Abdominal pain, unspecified abdominal location (Primary)  [D25.9] Uterine leiomyoma, unspecified location  [N83.201] Right ovarian cyst        ED Disposition Condition    Discharge Stable          ED Prescriptions       Medication Sig Dispense Start Date End Date Auth. Provider    medroxyPROGESTERone (PROVERA) 5 MG tablet Take 1 tablet (5 mg total) by mouth once daily. for 7 days 7 tablet 7/20/2023 7/27/2023 RICH Cobian    ibuprofen (ADVIL,MOTRIN) 800 MG tablet Take 1 tablet (800 mg total) by mouth every 6 (six) hours as needed for Pain. 20 tablet 7/20/2023 -- RICH Cobian          Follow-up Information    None          RICH Cobian  07/25/23 1420       RICH Cobian  07/26/23 1226

## 2023-07-25 ENCOUNTER — PATIENT OUTREACH (OUTPATIENT)
Dept: ADMINISTRATIVE | Facility: HOSPITAL | Age: 43
End: 2023-07-25

## 2023-07-25 ENCOUNTER — PATIENT MESSAGE (OUTPATIENT)
Dept: ADMINISTRATIVE | Facility: HOSPITAL | Age: 43
End: 2023-07-25

## 2023-07-25 NOTE — PROGRESS NOTES
Patient responded to portal message regarding Pap. She is scheduled with Day Abdi on 08/21/2023. Was seen in ER 07/20/2023 and would like to be seen sooner than her August appointment. Spoke with  [facility]; was transferred to nurse but no answer. Will reach out again to inquire about an earlier appointment for patient.

## 2023-07-26 ENCOUNTER — PATIENT OUTREACH (OUTPATIENT)
Dept: ADMINISTRATIVE | Facility: HOSPITAL | Age: 43
End: 2023-07-26

## 2023-07-26 NOTE — PROGRESS NOTES
Followed up on patient appointment. Spoke with Connie (). Left message with her at 0815 AM to have nurse return call. Awaiting response at this time.

## 2023-07-27 ENCOUNTER — PATIENT OUTREACH (OUTPATIENT)
Dept: ADMINISTRATIVE | Facility: HOSPITAL | Age: 43
End: 2023-07-27

## 2023-07-28 ENCOUNTER — OFFICE VISIT (OUTPATIENT)
Dept: FAMILY MEDICINE | Facility: CLINIC | Age: 43
End: 2023-07-28
Payer: OTHER GOVERNMENT

## 2023-07-28 VITALS
RESPIRATION RATE: 20 BRPM | HEIGHT: 68 IN | SYSTOLIC BLOOD PRESSURE: 142 MMHG | HEART RATE: 88 BPM | DIASTOLIC BLOOD PRESSURE: 91 MMHG | BODY MASS INDEX: 38.19 KG/M2 | OXYGEN SATURATION: 100 % | WEIGHT: 252 LBS

## 2023-07-28 DIAGNOSIS — N93.8 DYSFUNCTIONAL UTERINE BLEEDING: Primary | ICD-10-CM

## 2023-07-28 DIAGNOSIS — I10 PRIMARY HYPERTENSION: ICD-10-CM

## 2023-07-28 PROCEDURE — 99213 OFFICE O/P EST LOW 20 MIN: CPT | Mod: ,,, | Performed by: FAMILY MEDICINE

## 2023-07-28 PROCEDURE — 99213 PR OFFICE/OUTPT VISIT, EST, LEVL III, 20-29 MIN: ICD-10-PCS | Mod: ,,, | Performed by: FAMILY MEDICINE

## 2023-07-28 RX ORDER — DROSPIRENONE AND ETHINYL ESTRADIOL 0.02-3(28)
1 KIT ORAL DAILY
Qty: 30 TABLET | Refills: 11 | Status: SHIPPED | OUTPATIENT
Start: 2023-07-28 | End: 2023-08-10

## 2023-07-28 RX ORDER — AMLODIPINE BESYLATE 5 MG/1
5 TABLET ORAL DAILY
Qty: 30 TABLET | Refills: 11 | Status: SHIPPED | OUTPATIENT
Start: 2023-07-28 | End: 2024-01-05 | Stop reason: SDUPTHER

## 2023-07-31 PROBLEM — I10 PRIMARY HYPERTENSION: Status: ACTIVE | Noted: 2023-07-31

## 2023-07-31 NOTE — PROGRESS NOTES
Jamari Stout MD        PATIENT NAME: Kaylene Pryor  : 1980  DATE: 23  MRN: [id number]      Billing Provider: Jamari Stout MD  Level of Service: [place] OFFICE/OUTPT VISIT, EST, LEVL III, 20-29 MIN  Patient PCP Information       Provider PCP Type    Jamari Stout MD General            Reason for Visit / Chief Complaint: Menstrual Problem (Bleeding for 4 weeks) and Hypertension (Bp meds making her dizzy)       Update PCP  Update Chief Complaint         History of Present Illness / Problem Focused Workflow     Kaylene Pyror presents to the clinic with Menstrual Problem (Bleeding for 4 weeks) and Hypertension (Bp meds making her dizzy)     Please for follow-up dysfunctional uterine bleeding with a fibroid.  Has had a recent pelvic ultrasound.  Bleeding did decrease greatly with the addition of Provera 5 mg once daily.  Has started to recurrent now that the Provera has been stopped.  Has had a recent CBC which showed a normal hematocrit    Hypertension  Associated symptoms include headaches. Pertinent negatives include no chest pain, neck pain or palpitations.   Abdominal Pain  This is a new problem. The current episode started 1 to 4 weeks ago. The onset quality is sudden. The problem occurs constantly. The most recent episode lasted 1 weeks. The problem has been waxing and waning. The pain is located in the RLQ and left flank. The pain is at a severity of 3/10. The pain is moderate. The quality of the pain is cramping. Associated symptoms include arthralgias and headaches. Pertinent negatives include no anorexia, belching, constipation, diarrhea, dysuria, fever, flatus, frequency, hematochezia, hematuria, melena, myalgias, nausea, vomiting or weight loss. Nothing aggravates the pain. The pain is relieved by Nothing. She has tried acetaminophen for the symptoms. The treatment provided mild relief. Prior diagnostic workup includes ultrasound. Her past medical history is significant for abdominal surgery,  gallstones and GERD. There is no history of colon cancer, Crohn's disease, irritable bowel syndrome, pancreatitis, PUD or ulcerative colitis. Patient's medical history does not include kidney stones and UTI.       Review of Systems     Review of Systems   Constitutional:  Negative for activity change, appetite change, fever, unexpected weight change and weight loss.   HENT:  Negative for congestion, rhinorrhea, sinus pressure, sinus pain, sore throat and trouble swallowing.    Eyes:  Negative for photophobia, pain, discharge and visual disturbance.   Respiratory:  Negative for cough, chest tightness, wheezing and stridor.    Cardiovascular:  Negative for chest pain, palpitations and leg swelling.   Gastrointestinal:  Positive for abdominal pain. Negative for anorexia, blood in stool, constipation, diarrhea, flatus, hematochezia, melena, nausea and vomiting.   Endocrine: Negative for polydipsia, polyphagia and polyuria.   Genitourinary:  Negative for difficulty urinating, dysuria, flank pain, frequency and hematuria.   Musculoskeletal:  Positive for arthralgias. Negative for myalgias and neck pain.   Skin:  Negative for rash.   Allergic/Immunologic: Negative for food allergies.   Neurological:  Positive for headaches. Negative for dizziness, tremors, seizures, syncope and weakness (global weakness).   Psychiatric/Behavioral:  Negative for behavioral problems, confusion, decreased concentration, dysphoric mood and hallucinations. The patient is not nervous/anxious.         Medical / Social / Family History   No past medical history on file.    Past Surgical History:   Procedure Laterality Date     SECTION      CHOLECYSTECTOMY      LIPOSUCTION OF ABDOMEN      TONSILLECTOMY         Social History  Ms.  reports that she has never smoked. She has never been exposed to tobacco smoke. She has never used smokeless tobacco. She reports that she does not currently use alcohol. She reports that she does not use  drugs.    Family History  Ms.'s family history is not on file.    Medications and Allergies     Medications  No outpatient medications have been marked as taking for the 7/28/23 encounter (Office Visit) with Jamari Stout MD.       Allergies  Review of patient's allergies indicates:   Allergen Reactions    Opioids - morphine analogues Nausea And Vomiting       Physical Examination     Vitals:    07/28/23 1548   BP: (!) 142/91   Pulse: 88   Resp: 20     Physical Exam  Constitutional:       General: She is not in acute distress.     Appearance: Normal appearance.   HENT:      Head: Normocephalic.      Right Ear: Tympanic membrane and ear canal normal.      Left Ear: Tympanic membrane and ear canal normal.      Nose: Nose normal.      Mouth/Throat:      Mouth: Mucous membranes are moist.      Pharynx: No oropharyngeal exudate.   Eyes:      Extraocular Movements: Extraocular movements intact.      Pupils: Pupils are equal, round, and reactive to light.   Cardiovascular:      Rate and Rhythm: Normal rate and regular rhythm.      Heart sounds: No murmur heard.  Pulmonary:      Effort: Pulmonary effort is normal.      Breath sounds: Normal breath sounds. No wheezing.   Abdominal:      General: Abdomen is flat. Bowel sounds are normal.      Palpations: Abdomen is soft.      Hernia: No hernia is present.   Musculoskeletal:         General: Normal range of motion.      Cervical back: Normal range of motion and neck supple.      Right lower leg: No edema.      Left lower leg: No edema.   Lymphadenopathy:      Cervical: No cervical adenopathy.   Skin:     General: Skin is warm and dry.      Coloration: Skin is not jaundiced.      Findings: No lesion.   Neurological:      General: No focal deficit present.      Mental Status: She is alert and oriented to person, place, and time.      Cranial Nerves: No cranial nerve deficit.      Gait: Gait normal.   Psychiatric:         Mood and Affect: Mood normal.         Behavior:  Behavior normal.         Judgment: Judgment normal.          Assessment and Plan (including Health Maintenance)      Problem List  Smart Sets  Document Outside HM   :    Plan:     1. Primary hypertension  The current medical regimen is effective;  continue present plan and medications.  -     amLODIPine (NORVASC) 5 MG tablet; Take 1 tablet (5 mg total) by mouth once daily.  Dispense: 30 tablet; Refill: 11    2. Dysfunctional uterine bleeding    -     drospirenone-ethinyl estradioL (LISA) 3-0.02 mg per tablet; Take 1 tablet by mouth once daily.  Dispense: 30 tablet; Refill: 11          Health Maintenance Due   Topic Date Due    Hepatitis C Screening  Never done    Cervical Cancer Screening  Never done    HIV Screening  Never done    TETANUS VACCINE  Never done    Mammogram  Never done    Hemoglobin A1c (Diabetic Prevention Screening)  Never done       Problem List Items Addressed This Visit          Cardiac/Vascular    Primary hypertension    Relevant Medications    amLODIPine (NORVASC) 5 MG tablet     Other Visit Diagnoses       Dysfunctional uterine bleeding    -  Primary    Relevant Medications    drospirenone-ethinyl estradioL (LISA) 3-0.02 mg per tablet            Health Maintenance Topics with due status: Not Due       Topic Last Completion Date    Influenza Vaccine Not Due       Future Appointments   Date Time Provider Department Center   8/10/2023  1:30 PM Sophy Milligan MD [facility]   8/14/2023  2:00 PM Jamari Stout MD [facility]   9/28/2023 12:45 PM [facility] [place] [facility] [place]      Will start oral contraceptives.  She does have a follow-up with gyn next week  There are no Patient Instructions on file for this visit.  Follow up if symptoms worsen or fail to improve.     Signature:  Jamari Stout MD      Date of encounter: 7/28/23

## 2023-08-10 ENCOUNTER — OFFICE VISIT (OUTPATIENT)
Dept: OBSTETRICS AND GYNECOLOGY | Facility: CLINIC | Age: 43
End: 2023-08-10
Payer: OTHER GOVERNMENT

## 2023-08-10 VITALS
RESPIRATION RATE: 18 BRPM | BODY MASS INDEX: 36.98 KG/M2 | OXYGEN SATURATION: 99 % | WEIGHT: 244 LBS | DIASTOLIC BLOOD PRESSURE: 86 MMHG | HEART RATE: 98 BPM | TEMPERATURE: 99 F | HEIGHT: 68 IN | SYSTOLIC BLOOD PRESSURE: 122 MMHG

## 2023-08-10 DIAGNOSIS — D21.9 FIBROID: ICD-10-CM

## 2023-08-10 DIAGNOSIS — E66.9 OBESITY (BMI 35.0-39.9 WITHOUT COMORBIDITY): ICD-10-CM

## 2023-08-10 DIAGNOSIS — N94.6 DYSMENORRHEA: ICD-10-CM

## 2023-08-10 DIAGNOSIS — Z01.411 ENCOUNTER FOR GYNECOLOGICAL EXAMINATION (GENERAL) (ROUTINE) WITH ABNORMAL FINDINGS: ICD-10-CM

## 2023-08-10 DIAGNOSIS — N84.1 CERVICAL POLYP: ICD-10-CM

## 2023-08-10 DIAGNOSIS — Z12.4 SCREENING FOR MALIGNANT NEOPLASM OF THE CERVIX: Primary | ICD-10-CM

## 2023-08-10 DIAGNOSIS — N93.9 ABNORMAL UTERINE BLEEDING (AUB): ICD-10-CM

## 2023-08-10 PROCEDURE — 88305 TISSUE EXAM BY PATHOLOGIST: CPT | Mod: TC,SUR | Performed by: OBSTETRICS & GYNECOLOGY

## 2023-08-10 PROCEDURE — 87624 HPV HI-RISK TYP POOLED RSLT: CPT | Mod: ,,, | Performed by: CLINICAL MEDICAL LABORATORY

## 2023-08-10 PROCEDURE — 87624 HUMAN PAPILLOMAVIRUS (HPV): ICD-10-PCS | Mod: ,,, | Performed by: CLINICAL MEDICAL LABORATORY

## 2023-08-10 PROCEDURE — 99386 PR PREVENTIVE VISIT,NEW,40-64: ICD-10-PCS | Mod: S$PBB,,, | Performed by: OBSTETRICS & GYNECOLOGY

## 2023-08-10 PROCEDURE — 88305 TISSUE EXAM BY PATHOLOGIST: CPT | Mod: 26,,, | Performed by: PATHOLOGY

## 2023-08-10 PROCEDURE — 99386 PREV VISIT NEW AGE 40-64: CPT | Mod: S$PBB,,, | Performed by: OBSTETRICS & GYNECOLOGY

## 2023-08-10 PROCEDURE — 88142 CYTOPATH C/V THIN LAYER: CPT | Mod: TC,GCY | Performed by: OBSTETRICS & GYNECOLOGY

## 2023-08-10 PROCEDURE — 99214 OFFICE O/P EST MOD 30 MIN: CPT | Mod: PBBFAC | Performed by: OBSTETRICS & GYNECOLOGY

## 2023-08-10 PROCEDURE — 88305 SURGICAL PATHOLOGY: ICD-10-PCS | Mod: 26,,, | Performed by: PATHOLOGY

## 2023-08-10 RX ORDER — MEDROXYPROGESTERONE ACETATE 10 MG/1
20 TABLET ORAL 3 TIMES DAILY
Qty: 42 TABLET | Refills: 0 | Status: SHIPPED | OUTPATIENT
Start: 2023-08-10 | End: 2023-08-21

## 2023-08-10 RX ORDER — CYCLOBENZAPRINE HCL 10 MG
10 TABLET ORAL 3 TIMES DAILY PRN
Qty: 30 TABLET | Refills: 1 | Status: SHIPPED | OUTPATIENT
Start: 2023-08-10 | End: 2023-08-20

## 2023-08-10 NOTE — PROGRESS NOTES
Annual Exam    Assessment/Plan:  42 y.o.  presenting for her annual exam:    Problem List Items Addressed This Visit          Renal/    Dysmenorrhea    Relevant Medications    cyclobenzaprine (FLEXERIL) 10 MG tablet    Cervical polyp    Relevant Orders    Surgical Pathology    Polypectomy       Oncology    Fibroid    Overview     Told at ER i have a medium size one            Endocrine    Obesity (BMI 35.0-39.9 without comorbidity)     Other Visit Diagnoses       Screening for malignant neoplasm of the cervix    -  Primary    Relevant Orders    ThinPrep Pap Test    Encounter for gynecological examination (general) (routine) with abnormal findings        Abnormal uterine bleeding (AUB)        Relevant Medications    medroxyPROGESTERone (PROVERA) 10 MG tablet    Other Relevant Orders    Surgical Pathology        Annual exam with PAP performed.   F/u for mammogram in September as scheduled.  Due to endocervical polyp seen on exam, this was removed during exam and sent to pathology.   Endometrial biopsy was also performed, but only sounded to 6 cm. Therefore, this is likely an insufficient endometrial sample. Therefore, will plan to do an endometrial biopsy again at a future visit.     For AUB: recommended that she discontinue the OCPs. Start high dose progesterone: Medroxyprogesterone 20 mg po TID x 7 days or until the bleeding stops.  For dysmenorrhea uncontrolled with Motrin, Rx for Flexeril provided prn.     Discussed with the patient that we would get her prepped for a hysterectomy as this is her ultimate goal. However, if she can wait until after her planned trips, then she would have time to recover appropriately. However, if her symptoms are not controlled with medication, then we will plan on the surgery ASAP.     RTC in 10 days to assess response to medication and review pathology results.    RTC in 1 year for annual exam and/or prn.       CC:   Chief Complaint   Patient presents with    Annual Exam      Severe abdominal pain. ER visit on . Cyst 3 cm and fibroid 4cm on ovary and possible endometriosis        HPI:  42 y.o.  presents for her gynecologic annual exam. She also has a few concerns. She has been having heavy menstrual bleeding on and off for almost 1 month. She was seen in the ER on . She was given Medroxyprogesterone at that time just 1 tablet once a day for 10 days. Her bleeding slowed a small amount. However, once it was completed, her bleeding increased again.     US at that time showed an enlarged fibroid uterus.  The uterus measures 12 x 7 x 6 cm.  This possible fibroid arise from the uterus measuring up to 4 cm.  The endometrial stripe measures 0.2 cm in thickness.  Small volume fluid within the endometrial canal.  The right ovary measures 5 x 4 x 3 cm and the left ovary measures 3 x 3.4 x 1.8 cm. No worrisome adnexal mass is identified. No significant free fluid is demonstrated.  Normal doppler flow to the ovaries.     Impression:  Limited exam due to lack of transvaginal imaging.  Endometrial stripe is normal in thickness.  Small volume fluid within the endometrial canal.  Enlarged fibroid uterus.    Once the bleeding restarted she presented to her primary care and was started on OCPs in hopes to decrease the bleeding. This decreased the bleeding slightly, but she still continued to bleed. She is on this currently.    Also, she is having moderate to severe cramping that is only partially controlled with Motrin.     She eventually desires definitive surgical management via a hysterectomy. However, they have 2 big vacations planned over the next 2 months. They are going to [place] in September and [facility] [employer] in October. Ideally she would like to do the surgery after this if she can get good symptom control.     Patient seen and examined.      Health Maintenance:    Birth control: Withdrawal  Pregnancy plans: None   Safe relationship: Yes  Healthy diet: Yes   Exercise: ?  PCP: See  below.     Screening:  Last pap smear: 2-3 years ago.  History of abnormal pap smears: in the remote past.  STI screening: Declines  Mammogram: Scheduled for 2023.     Review of Systems: The following ROS was otherwise negative, except as noted in the HPI:  constitutional, HEENT, respiratory, cardiovascular, gastrointestinal, genitourinary, skin, musculoskeletal, neurological, psych    Primary Care Physician: Jamari Stout MD    Obstetric History  OB History    Para Term  AB Living   2 2 2     2   SAB IAB Ectopic Multiple Live Births           2      # Outcome Date GA Lbr Umang/2nd Weight Sex Delivery Anes PTL Lv   2 Term            1 Term               Obstetric Comments   1 FTVD   1 FTCS (HELLP)       Gynecologic History  Menstrual History:   LMP: 2023    Age of Menarche: ?   Age of Menopause: n/a   Menstrual Period: irregular, prior to this episode of long heavy bleeding menses were regular monthly, but lasting up to 5-7 days and heavy flow with cramping.   Interval Between Menses: 28 days   Duration of Menses: 5-7 days    Menstrual Flow: Heavy   Bleeding between menses: None until recently    Sexual History:    Contraception: see above   Currently is sexually active   Denies history of STIs   Denies sexual problems    Pap History:   History of abnormal pap smears: see above   Last pap: see above    Medical History:  Past Medical History:   Diagnosis Date    Abnormal uterine bleeding  period. Been bleeding since     Fibroid     Told at ER i have a medium size one    Hypertension 2023    Just started meds    Obesity (BMI 35.0-39.9 without comorbidity)        Medications:  Medication List with Changes/Refills   New Medications    CYCLOBENZAPRINE (FLEXERIL) 10 MG TABLET    Take 1 tablet (10 mg total) by mouth 3 (three) times daily as needed for Muscle spasms.    MEDROXYPROGESTERONE (PROVERA) 10 MG TABLET    Take 2 tablets (20 mg total) by mouth 3  "(three) times daily. for 7 days   Current Medications    AMLODIPINE (NORVASC) 5 MG TABLET    Take 1 tablet (5 mg total) by mouth once daily.    IBUPROFEN (ADVIL,MOTRIN) 800 MG TABLET    Take 1 tablet (800 mg total) by mouth every 6 (six) hours as needed for Pain.   Discontinued Medications    DROSPIRENONE-ETHINYL ESTRADIOL (LISA) 3-0.02 MG PER TABLET    Take 1 tablet by mouth once daily.    MEDROXYPROGESTERONE (PROVERA) 5 MG TABLET    Take 1 tablet (5 mg total) by mouth once daily. for 7 days         Surgical History:  Past Surgical History:   Procedure Laterality Date     SECTION      CHOLECYSTECTOMY      COLPOSCOPY  Not sure    COSMETIC SURGERY   and 2016    abdominoplasty    LIPOSUCTION OF ABDOMEN      TONSILLECTOMY         Allergies:  Review of patient's allergies indicates:   Allergen Reactions    Opioids - morphine analogues Nausea And Vomiting       Family History:  Family History   Problem Relation Age of Onset    Asthma Mother     Cancer Mother         Melanoma    Hypertension Mother     Diabetes Father     Cancer Maternal Grandfather         Melanoma    Hypertension Maternal Grandmother        Social History:  Social History     Socioeconomic History    Marital status:    Tobacco Use    Smoking status: Never     Passive exposure: Never    Smokeless tobacco: Never   Substance and Sexual Activity    Alcohol use: Never    Drug use: Never    Sexual activity: Yes     Partners: Male     Birth control/protection: Coitus interruptus     Comment: I am in birth cintrol right now for bleeding       Objective:  Body mass index is 37.1 kg/m².    /86   Pulse 98   Temp 98.7 °F (37.1 °C)   Resp 18   Ht 5' 8" (1.727 m)   Wt 110.7 kg (244 lb)   LMP  (LMP Unknown) Comment: bleeding since 7-  SpO2 99%   BMI 37.10 kg/m²     General: Alert, well appearing, no acute distress. Obese.  Head: Normocephalic, atraumatic  Breasts: Symmetric, non-tender to palpation, no skin changes, palpable " axillary lymph nodes or masses noted  Lungs: Unlabored respirations. Clear to auscultation bilaterally.  Cardiovascular: Regular rate and Rhythm.  Abdomen: Soft, nontender, nondistended   Pelvic:   External: normal female genitalia, no masses or lesions   Vagina: moist, pink mucosa with rugae, moderate amount of old blood. Slow oozing from cervix.   Cervix: Endocervical polyp pink about 2 cm protruding from cervical os. PAP performed. Polyp grasped with ring forceps and removed via a twisting motion. Sent to pathology. Endometrial biopsy performed but only sounded to 6 cm.   Uterus: approx 12-14 weeks, mobile, anteverted, nontender  Adnexa: no masses or fullness, nontender  Rectovaginal: deferred  Extremities: No redness or tenderness  Skin: Well perfused, normal coloration and turgor, no lesions or rashes visualized  Neuro: Alert, oriented, normal speech, no focal deficits, moves extremities appropriately  Psych: No depression or anxiety.       Answers submitted by the patient for this visit:  Pelvic Pain Questionnaire (Submitted on 2023)  Chief Complaint: Pelvic pain  Chronicity: new  Onset: 1 to 4 weeks ago  Frequency: constantly  Progression since onset: waxing and waning  Pain severity: moderate  Affected side: both  Pregnant now?: No  abdominal pain: Yes  anorexia: No  back pain: Yes  chills: No  constipation: No  diarrhea: No  discolored urine: No  dysuria: No  fever: No  flank pain: Yes  frequency: No  headaches: No  hematuria: No  nausea: No  painful intercourse: No  rash: No  urgency: No  vomiting: No  Aggravated by: nothing  treatments tried: acetaminophen, NSAIDs  Improvement on treatment: mild  Sexual activity: sexually active  Partner with STD symptoms: no  Menstrual history: regular  STD: No  abdominal surgery: Yes   section: Yes  Ectopic pregnancy: No  Endometriosis: No  herpes simplex: No  gynecological surgery: No  menorrhagia: Yes  metrorrhagia: No  miscarriage: No  ovarian cysts:  Yes  perineal abscess: No  PID: No  terminated pregnancy: No  vaginosis: No

## 2023-08-11 DIAGNOSIS — N93.9 ABNORMAL UTERINE BLEEDING: Primary | ICD-10-CM

## 2023-08-11 PROBLEM — N94.6 DYSMENORRHEA: Status: ACTIVE | Noted: 2023-08-11

## 2023-08-11 PROBLEM — D21.9 FIBROID: Status: ACTIVE | Noted: 2023-08-11

## 2023-08-11 PROBLEM — N84.1 CERVICAL POLYP: Status: ACTIVE | Noted: 2023-08-11

## 2023-08-11 PROBLEM — E66.9 OBESITY (BMI 35.0-39.9 WITHOUT COMORBIDITY): Status: ACTIVE | Noted: 2023-08-11

## 2023-08-11 LAB
GH SERPL-MCNC: NORMAL NG/ML
INSULIN SERPL-ACNC: NORMAL U[IU]/ML
LAB AP CLINICAL INFORMATION: NORMAL
LAB AP GYN INTERPRETATION: NORMAL
LAB AP PAP DISCLAIMER COMMENTS: NORMAL
RENIN PLAS-CCNC: NORMAL NG/ML/H

## 2023-08-13 LAB
HPV 16: NEGATIVE
HPV 18: NEGATIVE
HPV OTHER: NEGATIVE

## 2023-08-14 LAB
DHEA SERPL-MCNC: NORMAL
ESTROGEN SERPL-MCNC: NORMAL PG/ML
INSULIN SERPL-ACNC: NORMAL U[IU]/ML
LAB AP CLINICAL INFORMATION: NORMAL
LAB AP GROSS DESCRIPTION: NORMAL
LAB AP LABORATORY NOTES: NORMAL
T3RU NFR SERPL: NORMAL %

## 2023-08-16 ENCOUNTER — PATIENT MESSAGE (OUTPATIENT)
Dept: OBSTETRICS AND GYNECOLOGY | Facility: CLINIC | Age: 43
End: 2023-08-16
Payer: OTHER GOVERNMENT

## 2023-08-21 ENCOUNTER — OFFICE VISIT (OUTPATIENT)
Dept: OBSTETRICS AND GYNECOLOGY | Facility: CLINIC | Age: 43
End: 2023-08-21
Payer: OTHER GOVERNMENT

## 2023-08-21 VITALS
HEIGHT: 68 IN | WEIGHT: 246 LBS | RESPIRATION RATE: 18 BRPM | SYSTOLIC BLOOD PRESSURE: 136 MMHG | TEMPERATURE: 98 F | HEART RATE: 113 BPM | OXYGEN SATURATION: 99 % | DIASTOLIC BLOOD PRESSURE: 84 MMHG | BODY MASS INDEX: 37.28 KG/M2

## 2023-08-21 DIAGNOSIS — N92.1 MENORRHAGIA WITH IRREGULAR CYCLE: ICD-10-CM

## 2023-08-21 DIAGNOSIS — N93.9 ABNORMAL UTERINE BLEEDING: Primary | ICD-10-CM

## 2023-08-21 PROCEDURE — 99214 PR OFFICE/OUTPT VISIT, EST, LEVL IV, 30-39 MIN: ICD-10-PCS | Mod: S$PBB,,, | Performed by: OBSTETRICS & GYNECOLOGY

## 2023-08-21 PROCEDURE — 99215 OFFICE O/P EST HI 40 MIN: CPT | Mod: PBBFAC | Performed by: OBSTETRICS & GYNECOLOGY

## 2023-08-21 PROCEDURE — 99214 OFFICE O/P EST MOD 30 MIN: CPT | Mod: S$PBB,,, | Performed by: OBSTETRICS & GYNECOLOGY

## 2023-08-21 RX ORDER — MUPIROCIN 20 MG/G
OINTMENT TOPICAL
Status: CANCELLED | OUTPATIENT
Start: 2023-08-21

## 2023-08-21 RX ORDER — SODIUM CHLORIDE 9 MG/ML
INJECTION, SOLUTION INTRAVENOUS CONTINUOUS
Status: CANCELLED | OUTPATIENT
Start: 2023-08-21

## 2023-08-21 RX ORDER — TRANEXAMIC ACID 650 MG/1
1300 TABLET ORAL 3 TIMES DAILY
Qty: 30 TABLET | Refills: 3 | Status: ON HOLD | OUTPATIENT
Start: 2023-08-21 | End: 2023-08-23 | Stop reason: HOSPADM

## 2023-08-21 RX ORDER — ACETAMINOPHEN 500 MG
1000 TABLET ORAL
Status: CANCELLED | OUTPATIENT
Start: 2023-08-21

## 2023-08-21 RX ORDER — DOXYCYCLINE HYCLATE 100 MG
100 TABLET ORAL 2 TIMES DAILY
Status: CANCELLED | OUTPATIENT
Start: 2023-08-21 | End: 2023-08-22

## 2023-08-21 RX ORDER — FAMOTIDINE 20 MG/1
20 TABLET, FILM COATED ORAL
Status: CANCELLED | OUTPATIENT
Start: 2023-08-21

## 2023-08-21 NOTE — H&P (VIEW-ONLY)
Answers submitted by the patient for this visit:  Vaginal Bleeding Questionnaire  (Submitted on 2023)  Chief Complaint: Vaginal bleeding  Chronicity: recurrent  Onset: more than 1 month ago  Frequency: constantly  Progression since onset: waxing and waning  Pain severity: moderate  Affected side: both  Pregnant now?: No  abdominal pain: Yes  anorexia: No  back pain: Yes  chills: No  constipation: No  diarrhea: No  discolored urine: No  dysuria: No  fever: No  flank pain: No  frequency: No  headaches: Yes  hematuria: No  nausea: No  painful intercourse: No  rash: No  urgency: No  vomiting: No  Vaginal bleeding: typical of menses  Passing clots?: Yes  Passing tissue?: Yes  Aggravated by: nothing  treatments tried: heating pad, NSAIDs  Improvement on treatment: mild  Sexual activity: sexually active  Partner with STD symptoms: no  Birth control: the rhythm method  Menstrual history: regular  STD: No  abdominal surgery: Yes   section: Yes  Ectopic pregnancy: No  Endometriosis: No  herpes simplex: No  gynecological surgery: No  menorrhagia: Yes  metrorrhagia: No  miscarriage: No  ovarian cysts: Yes  perineal abscess: No  PID: No  terminated pregnancy: No  vaginosis: No    Gynecology History and Physical    Assessment/Plan:   Problem List Items Addressed This Visit          Renal/    Abnormal uterine bleeding - Primary    Overview     Mussed  period. Been bleeding since          Relevant Medications    tranexamic acid (LYSTEDA) 650 mg tablet    Other Relevant Orders    Case Request Operating Room: HYSTEROSCOPY, WITH DILATION AND CURETTAGE OF UTERUS (Completed)     Other Visit Diagnoses       Menorrhagia with irregular cycle        Relevant Medications    tranexamic acid (LYSTEDA) 650 mg tablet        Will give TXA now to help decrease the vaginal bleeding until surgery.  She is scheduled on 2023 at 1345 for a hysteroscopy dilation and curettage with MyoSure and a PAP.  Discussed the benefits  of diagnosis and therapeutic. The goal is to stop or lessen the vaginal bleeding so that she can enjoy her upcoming trips that are planned. Then when she returns in October, will plan a total robotic hysterectomy with bilateral salpingectomy.  The alternatives are medical management or expectant management which has not been successful thus far.  The risks are bleeding, infection, uterine perforation, risks of anesthesia.   She and her  verbalized their understanding and she desires to proceed. All questions answered. Consents reviewed and signed.   Bleeding precautions discussed.      CC:   Chief Complaint   Patient presents with    Follow-up     HPI: Kaylene Pryor is a 42 y.o. female who presents with complaints of continued heavy vaginal bleeding.  She has been having heavy menstrual bleeding on and off for almost 1 month. She was seen in the ER on 7/20. She was given Medroxyprogesterone at that time just 1 tablet once a day for 10 days. Her bleeding slowed a small amount. However, once it was completed, her bleeding increased again.      US at that time showed an enlarged fibroid uterus.  The uterus measures 12 x 7 x 6 cm.  This possible fibroid arise from the uterus measuring up to 4 cm.  The endometrial stripe measures 0.2 cm in thickness.  Small volume fluid within the endometrial canal.  The right ovary measures 5 x 4 x 3 cm and the left ovary measures 3 x 3.4 x 1.8 cm. No worrisome adnexal mass is identified. No significant free fluid is demonstrated.  Normal doppler flow to the ovaries.     Impression:  Limited exam due to lack of transvaginal imaging.  Endometrial stripe is normal in thickness.  Small volume fluid within the endometrial canal.  Enlarged fibroid uterus.     Once the bleeding restarted she presented to her primary care and was started on OCPs in hopes to decrease the bleeding. This decreased the bleeding slightly, but she still continued to bleed. She is on this currently.     Also,  she is having moderate to severe cramping that is only partially controlled with Motrin.      She eventually desires definitive surgical management via a hysterectomy. However, they have 2 big vacations planned over the next 2 months. They are going to [place] in September and [facility] [employer] in October. Ideally she would like to do the surgery after this if she can get good symptom control.     She was seen on 8/10/2023 and given high dose progesterone: Medroxyprogesterone 20 mg po TID x 7 days. Her bleeding decreased on this, but she still continued to bleed. It did not stop. Then when the medication stopped, her vaginal bleeding increased again. She is frustrated and concerned. Discussed with the patient the option of hysteroscopy dilation and curettage to help get the bleeding to stop surgically and obtain a diagnosis. She is in agreement with this.     PAP was unsatisfactory, but HPV was negative.   Endometrial biopsy in the office showed no endometrial tissue.  Therefore will plan PAP in the OR as well.         Review of Systems: The following ROS was otherwise negative, except as noted in the HPI:  constitutional, HEENT, respiratory, cardiovascular, gastrointestinal, genitourinary, skin, musculoskeletal, neurological, psych    Gynecologic History:   No history of abnormal pap smears  No history of of STIs  Menstrual history:       Obstetrical History:  OB History          2    Para   2    Term   2            AB        Living   2         SAB        IAB        Ectopic        Multiple        Live Births   2           Obstetric Comments   1 FTVD  1 FTCS (HELLP)               Past Medical History:   Past Medical History:   Diagnosis Date    Abnormal uterine bleeding 2023    Mussed mercy period. Been bleeding since     Fibroid     Told at ER i have a medium size one    Hypertension 2023    Just started meds    Obesity (BMI 35.0-39.9 without comorbidity)   "      Medications:  Medication List with Changes/Refills   New Medications    TRANEXAMIC ACID (LYSTEDA) 650 MG TABLET    Take 2 tablets (1,300 mg total) by mouth 3 (three) times daily. for 5 days   Current Medications    AMLODIPINE (NORVASC) 5 MG TABLET    Take 1 tablet (5 mg total) by mouth once daily.   Discontinued Medications    IBUPROFEN (ADVIL,MOTRIN) 800 MG TABLET    Take 1 tablet (800 mg total) by mouth every 6 (six) hours as needed for Pain.    MEDROXYPROGESTERONE (PROVERA) 10 MG TABLET    Take 2 tablets (20 mg total) by mouth 3 (three) times daily. for 7 days       Allergies:  Opioids - morphine analogues    Surgical History:  Past Surgical History:   Procedure Laterality Date     SECTION      CHOLECYSTECTOMY      COLPOSCOPY  Not sure    COSMETIC SURGERY   and     abdominoplasty    LIPOSUCTION OF ABDOMEN      TONSILLECTOMY         Family History:  Family History   Problem Relation Age of Onset    Asthma Mother     Cancer Mother         Melanoma    Hypertension Mother     Diabetes Father     Cancer Maternal Grandfather         Melanoma    Hypertension Maternal Grandmother        Social History:  Social History     Substance and Sexual Activity   Alcohol Use Never     Social History     Substance and Sexual Activity   Drug Use Never     Social History     Tobacco Use   Smoking Status Never    Passive exposure: Never   Smokeless Tobacco Never       Physical Exam:  /84   Pulse (!) 113   Temp 98.2 °F (36.8 °C)   Resp 18   Ht 5' 8" (1.727 m)   Wt 111.6 kg (246 lb)   LMP  (LMP Unknown) Comment: bleeding since 23  SpO2 99%   BMI 37.40 kg/m²     General: Alert, well appearing, no acute distress. Obese.  Head: Normocephalic, atraumatic  Lungs: Unlabored respirations.   Cardiovascular: Mildly tachycardic.  Abdomen: Soft, nontender, nondistended   Pelvic:   External: normal female genitalia, no masses or lesions   Vagina: moist, pink mucosa with rugae. Moderate amount of old blood in " vault.  Cervix: no masses or lesions, nontender. Oozing dark blood from cervix.  Uterus: approx 12 weeks, mobile, midline, nontender  Adnexa: no masses or fullness, nontender  Rectovaginal: deferred  Extremities: No redness or tenderness  Skin: Well perfused, normal coloration and turgor, no lesions or rashes visualized  Neuro: Alert, oriented, normal speech, no focal deficits, moves extremities appropriately  Psych: No depression or anxiety.    Labs:  No visits with results within 1 Day(s) from this visit.   Latest known visit with results is:   Office Visit on 08/10/2023   Component Date Value    Case Report 08/10/2023                      Value:Pap Cytology                                      Case: [id number]                                   Authorizing Provider:  Sophy Milligan MD       Collected:           08/10/2023 04:27 PM          Ordering Location:     [facility] Received:            08/11/2023 08:48 AM                                 - Obstetrics And                                                                                    Gynecology                                                                   First Screen:          Day Leon CT(ASCP)                                                   Specimen:    Liquid-Based Pap Test, Screening, Cervix                                                   Interpretation 08/10/2023 Unsatisfactory     General Categorization 08/10/2023 Unsatisfactory for evaluation     Specimen Adequacy 08/10/2023 Unsatisfactory for evaluation  Scant cellularity     Clinical Information 08/10/2023                      Value:This result contains rich text formatting which cannot be displayed here.    Disclaimer 08/10/2023                      Value:This result contains rich text formatting which cannot be displayed here.    Case Report 08/10/2023                      Value:Surgical Pathology                                Case: [id number]                                    Authorizing Provider:  Sophy Milligan MD       Collected:           08/10/2023 04:27 PM          Ordering Location:     [facility] Received:            08/11/2023 08:27 AM                                 - Obstetrics And                                                                                    Gynecology                                                                   Pathologist:           Jayme Rodriguez III, MD                                                                           Specimens:   A) - Cervix, polyp                                                                                  B) - Endometrium, EMB                                                                      Final Diagnosis 08/10/2023                      Value:This result contains rich text formatting which cannot be displayed here.    Comments 08/10/2023                      Value:This result contains rich text formatting which cannot be displayed here.    Gross Description 08/10/2023                      Value:This result contains rich text formatting which cannot be displayed here.    Microscopic Description 08/10/2023                      Value:This result contains rich text formatting which cannot be displayed here.    Clinical Information 08/10/2023                      Value:This result contains rich text formatting which cannot be displayed here.    Laboratory Notes 08/10/2023                      Value:This result contains rich text formatting which cannot be displayed here.    HPV 16 08/10/2023 Negative     HPV 18 08/10/2023 Negative     HPV Other 08/10/2023 Negative

## 2023-08-21 NOTE — PROGRESS NOTES
Answers submitted by the patient for this visit:  Vaginal Bleeding Questionnaire  (Submitted on 2023)  Chief Complaint: Vaginal bleeding  Chronicity: recurrent  Onset: more than 1 month ago  Frequency: constantly  Progression since onset: waxing and waning  Pain severity: moderate  Affected side: both  Pregnant now?: No  abdominal pain: Yes  anorexia: No  back pain: Yes  chills: No  constipation: No  diarrhea: No  discolored urine: No  dysuria: No  fever: No  flank pain: No  frequency: No  headaches: Yes  hematuria: No  nausea: No  painful intercourse: No  rash: No  urgency: No  vomiting: No  Vaginal bleeding: typical of menses  Passing clots?: Yes  Passing tissue?: Yes  Aggravated by: nothing  treatments tried: heating pad, NSAIDs  Improvement on treatment: mild  Sexual activity: sexually active  Partner with STD symptoms: no  Birth control: the rhythm method  Menstrual history: regular  STD: No  abdominal surgery: Yes   section: Yes  Ectopic pregnancy: No  Endometriosis: No  herpes simplex: No  gynecological surgery: No  menorrhagia: Yes  metrorrhagia: No  miscarriage: No  ovarian cysts: Yes  perineal abscess: No  PID: No  terminated pregnancy: No  vaginosis: No    Gynecology History and Physical    Assessment/Plan:   Problem List Items Addressed This Visit          Renal/    Abnormal uterine bleeding - Primary    Overview     Mussed  period. Been bleeding since          Relevant Medications    tranexamic acid (LYSTEDA) 650 mg tablet    Other Relevant Orders    Case Request Operating Room: HYSTEROSCOPY, WITH DILATION AND CURETTAGE OF UTERUS (Completed)     Other Visit Diagnoses       Menorrhagia with irregular cycle        Relevant Medications    tranexamic acid (LYSTEDA) 650 mg tablet        Will give TXA now to help decrease the vaginal bleeding until surgery.  She is scheduled on 2023 at 1345 for a hysteroscopy dilation and curettage with MyoSure and a PAP.  Discussed the benefits  of diagnosis and therapeutic. The goal is to stop or lessen the vaginal bleeding so that she can enjoy her upcoming trips that are planned. Then when she returns in October, will plan a total robotic hysterectomy with bilateral salpingectomy.  The alternatives are medical management or expectant management which has not been successful thus far.  The risks are bleeding, infection, uterine perforation, risks of anesthesia.   She and her  verbalized their understanding and she desires to proceed. All questions answered. Consents reviewed and signed.   Bleeding precautions discussed.      CC:   Chief Complaint   Patient presents with    Follow-up     HPI: Kaylene Pryor is a 42 y.o. female who presents with complaints of continued heavy vaginal bleeding.  She has been having heavy menstrual bleeding on and off for almost 1 month. She was seen in the ER on 7/20. She was given Medroxyprogesterone at that time just 1 tablet once a day for 10 days. Her bleeding slowed a small amount. However, once it was completed, her bleeding increased again.      US at that time showed an enlarged fibroid uterus.  The uterus measures 12 x 7 x 6 cm.  This possible fibroid arise from the uterus measuring up to 4 cm.  The endometrial stripe measures 0.2 cm in thickness.  Small volume fluid within the endometrial canal.  The right ovary measures 5 x 4 x 3 cm and the left ovary measures 3 x 3.4 x 1.8 cm. No worrisome adnexal mass is identified. No significant free fluid is demonstrated.  Normal doppler flow to the ovaries.     Impression:  Limited exam due to lack of transvaginal imaging.  Endometrial stripe is normal in thickness.  Small volume fluid within the endometrial canal.  Enlarged fibroid uterus.     Once the bleeding restarted she presented to her primary care and was started on OCPs in hopes to decrease the bleeding. This decreased the bleeding slightly, but she still continued to bleed. She is on this currently.     Also,  she is having moderate to severe cramping that is only partially controlled with Motrin.      She eventually desires definitive surgical management via a hysterectomy. However, they have 2 big vacations planned over the next 2 months. They are going to [place] in September and [facility] in October. Ideally she would like to do the surgery after this if she can get good symptom control.     She was seen on 8/10/2023 and given high dose progesterone: Medroxyprogesterone 20 mg po TID x 7 days. Her bleeding decreased on this, but she still continued to bleed. It did not stop. Then when the medication stopped, her vaginal bleeding increased again. She is frustrated and concerned. Discussed with the patient the option of hysteroscopy dilation and curettage to help get the bleeding to stop surgically and obtain a diagnosis. She is in agreement with this.     PAP was unsatisfactory, but HPV was negative.   Endometrial biopsy in the office showed no endometrial tissue.  Therefore will plan PAP in the OR as well.         Review of Systems: The following ROS was otherwise negative, except as noted in the HPI:  constitutional, HEENT, respiratory, cardiovascular, gastrointestinal, genitourinary, skin, musculoskeletal, neurological, psych    Gynecologic History:   No history of abnormal pap smears  No history of of STIs  Menstrual history:       Obstetrical History:  OB History          2    Para   2    Term   2            AB        Living   2         SAB        IAB        Ectopic        Multiple        Live Births   2           Obstetric Comments   1 FTVD  1 FTCS (HELLP)               Past Medical History:   Past Medical History:   Diagnosis Date    Abnormal uterine bleeding 2023    Mussed mercy period. Been bleeding since     Fibroid     Told at ER i have a medium size one    Hypertension 2023    Just started meds    Obesity (BMI 35.0-39.9 without comorbidity)   "      Medications:  Medication List with Changes/Refills   New Medications    TRANEXAMIC ACID (LYSTEDA) 650 MG TABLET    Take 2 tablets (1,300 mg total) by mouth 3 (three) times daily. for 5 days   Current Medications    AMLODIPINE (NORVASC) 5 MG TABLET    Take 1 tablet (5 mg total) by mouth once daily.   Discontinued Medications    IBUPROFEN (ADVIL,MOTRIN) 800 MG TABLET    Take 1 tablet (800 mg total) by mouth every 6 (six) hours as needed for Pain.    MEDROXYPROGESTERONE (PROVERA) 10 MG TABLET    Take 2 tablets (20 mg total) by mouth 3 (three) times daily. for 7 days       Allergies:  Opioids - morphine analogues    Surgical History:  Past Surgical History:   Procedure Laterality Date     SECTION      CHOLECYSTECTOMY      COLPOSCOPY  Not sure    COSMETIC SURGERY   and     abdominoplasty    LIPOSUCTION OF ABDOMEN      TONSILLECTOMY         Family History:  Family History   Problem Relation Age of Onset    Asthma Mother     Cancer Mother         Melanoma    Hypertension Mother     Diabetes Father     Cancer Maternal Grandfather         Melanoma    Hypertension Maternal Grandmother        Social History:  Social History     Substance and Sexual Activity   Alcohol Use Never     Social History     Substance and Sexual Activity   Drug Use Never     Social History     Tobacco Use   Smoking Status Never    Passive exposure: Never   Smokeless Tobacco Never       Physical Exam:  /84   Pulse (!) 113   Temp 98.2 °F (36.8 °C)   Resp 18   Ht 5' 8" (1.727 m)   Wt 111.6 kg (246 lb)   LMP  (LMP Unknown) Comment: bleeding since 23  SpO2 99%   BMI 37.40 kg/m²     General: Alert, well appearing, no acute distress. Obese.  Head: Normocephalic, atraumatic  Lungs: Unlabored respirations.   Cardiovascular: Mildly tachycardic.  Abdomen: Soft, nontender, nondistended   Pelvic:   External: normal female genitalia, no masses or lesions   Vagina: moist, pink mucosa with rugae. Moderate amount of old blood in " vault.  Cervix: no masses or lesions, nontender. Oozing dark blood from cervix.  Uterus: approx 12 weeks, mobile, midline, nontender  Adnexa: no masses or fullness, nontender  Rectovaginal: deferred  Extremities: No redness or tenderness  Skin: Well perfused, normal coloration and turgor, no lesions or rashes visualized  Neuro: Alert, oriented, normal speech, no focal deficits, moves extremities appropriately  Psych: No depression or anxiety.    Labs:  No visits with results within 1 Day(s) from this visit.   Latest known visit with results is:   Office Visit on 08/10/2023   Component Date Value    Case Report 08/10/2023                      Value:Pap Cytology                                      Case: [id number]                                   Authorizing Provider:  Sophy Milligan MD       Collected:           08/10/2023 04:27 PM          Ordering Location:     [facility] Received:            08/11/2023 08:48 AM                                 - Obstetrics And                                                                                    Gynecology                                                                   First Screen:          Day Leon CT(ASCP)                                                   Specimen:    Liquid-Based Pap Test, Screening, Cervix                                                   Interpretation 08/10/2023 Unsatisfactory     General Categorization 08/10/2023 Unsatisfactory for evaluation     Specimen Adequacy 08/10/2023 Unsatisfactory for evaluation  Scant cellularity     Clinical Information 08/10/2023                      Value:This result contains rich text formatting which cannot be displayed here.    Disclaimer 08/10/2023                      Value:This result contains rich text formatting which cannot be displayed here.    Case Report 08/10/2023                      Value:Surgical Pathology                                Case: [id number]                                    Authorizing Provider:  Sophy Milligan MD       Collected:           08/10/2023 04:27 PM          Ordering Location:     [facility] Received:            08/11/2023 08:27 AM                                 - Obstetrics And                                                                                    Gynecology                                                                   Pathologist:           Jayme Rodriguez III, MD                                                                           Specimens:   A) - Cervix, polyp                                                                                  B) - Endometrium, EMB                                                                      Final Diagnosis 08/10/2023                      Value:This result contains rich text formatting which cannot be displayed here.    Comments 08/10/2023                      Value:This result contains rich text formatting which cannot be displayed here.    Gross Description 08/10/2023                      Value:This result contains rich text formatting which cannot be displayed here.    Microscopic Description 08/10/2023                      Value:This result contains rich text formatting which cannot be displayed here.    Clinical Information 08/10/2023                      Value:This result contains rich text formatting which cannot be displayed here.    Laboratory Notes 08/10/2023                      Value:This result contains rich text formatting which cannot be displayed here.    HPV 16 08/10/2023 Negative     HPV 18 08/10/2023 Negative     HPV Other 08/10/2023 Negative

## 2023-08-23 ENCOUNTER — ANESTHESIA (OUTPATIENT)
Dept: SURGERY | Facility: HOSPITAL | Age: 43
End: 2023-08-23
Payer: OTHER GOVERNMENT

## 2023-08-23 ENCOUNTER — HOSPITAL ENCOUNTER (OUTPATIENT)
Facility: HOSPITAL | Age: 43
Discharge: HOME | End: 2023-08-23
Attending: OBSTETRICS & GYNECOLOGY | Admitting: OBSTETRICS & GYNECOLOGY
Payer: OTHER GOVERNMENT

## 2023-08-23 ENCOUNTER — ANESTHESIA EVENT (OUTPATIENT)
Dept: SURGERY | Facility: HOSPITAL | Age: 43
End: 2023-08-23
Payer: OTHER GOVERNMENT

## 2023-08-23 VITALS
HEART RATE: 74 BPM | SYSTOLIC BLOOD PRESSURE: 110 MMHG | DIASTOLIC BLOOD PRESSURE: 68 MMHG | RESPIRATION RATE: 16 BRPM | OXYGEN SATURATION: 98 %

## 2023-08-23 VITALS
DIASTOLIC BLOOD PRESSURE: 76 MMHG | OXYGEN SATURATION: 98 % | BODY MASS INDEX: 37.13 KG/M2 | HEIGHT: 68 IN | TEMPERATURE: 98 F | SYSTOLIC BLOOD PRESSURE: 123 MMHG | RESPIRATION RATE: 14 BRPM | WEIGHT: 245 LBS | HEART RATE: 79 BPM

## 2023-08-23 DIAGNOSIS — N84.0 ENDOMETRIAL POLYP: Primary | ICD-10-CM

## 2023-08-23 DIAGNOSIS — N93.9 ABNORMAL UTERINE BLEEDING (AUB): ICD-10-CM

## 2023-08-23 DIAGNOSIS — N93.9 ABNORMAL UTERINE BLEEDING: ICD-10-CM

## 2023-08-23 DIAGNOSIS — Z01.818 PREOPERATIVE CLEARANCE: ICD-10-CM

## 2023-08-23 LAB
B-HCG UR QL: NEGATIVE
BASOPHILS # BLD AUTO: 0.09 K/UL (ref 0–0.2)
BASOPHILS NFR BLD AUTO: 0.8 % (ref 0–1)
CTP QC/QA: YES
DIFFERENTIAL METHOD BLD: ABNORMAL
EOSINOPHIL # BLD AUTO: 0.54 K/UL (ref 0–0.5)
EOSINOPHIL NFR BLD AUTO: 4.9 % (ref 1–4)
ERYTHROCYTE [DISTWIDTH] IN BLOOD BY AUTOMATED COUNT: 13.5 % (ref 11.5–14.5)
HCT VFR BLD AUTO: 43.4 % (ref 38–47)
HGB BLD-MCNC: 14.8 G/DL (ref 12–16)
IMM GRANULOCYTES # BLD AUTO: 0.02 K/UL (ref 0–0.04)
IMM GRANULOCYTES NFR BLD: 0.2 % (ref 0–0.4)
INDIRECT COOMBS: NORMAL
LYMPHOCYTES # BLD AUTO: 3.08 K/UL (ref 1–4.8)
LYMPHOCYTES NFR BLD AUTO: 27.8 % (ref 27–41)
MCH RBC QN AUTO: 29.5 PG (ref 27–31)
MCHC RBC AUTO-ENTMCNC: 34.1 G/DL (ref 32–36)
MCV RBC AUTO: 86.5 FL (ref 80–96)
MONOCYTES # BLD AUTO: 0.67 K/UL (ref 0–0.8)
MONOCYTES NFR BLD AUTO: 6 % (ref 2–6)
MPC BLD CALC-MCNC: 10 FL (ref 9.4–12.4)
NEUTROPHILS # BLD AUTO: 6.69 K/UL (ref 1.8–7.7)
NEUTROPHILS NFR BLD AUTO: 60.3 % (ref 53–65)
NRBC # BLD AUTO: 0 X10E3/UL
NRBC, AUTO (.00): 0 %
PLATELET # BLD AUTO: 419 K/UL (ref 150–400)
RBC # BLD AUTO: 5.02 M/UL (ref 4.2–5.4)
RH BLD: NORMAL
SPECIMEN OUTDATE: NORMAL
WBC # BLD AUTO: 11.09 K/UL (ref 4.5–11)

## 2023-08-23 PROCEDURE — 58558 HYSTEROSCOPY BIOPSY: CPT | Mod: ,,, | Performed by: OBSTETRICS & GYNECOLOGY

## 2023-08-23 PROCEDURE — 36000706: Performed by: OBSTETRICS & GYNECOLOGY

## 2023-08-23 PROCEDURE — 25000003 PHARM REV CODE 250: Performed by: ANESTHESIOLOGY

## 2023-08-23 PROCEDURE — 93010 ELECTROCARDIOGRAM REPORT: CPT | Mod: ,,, | Performed by: STUDENT IN AN ORGANIZED HEALTH CARE EDUCATION/TRAINING PROGRAM

## 2023-08-23 PROCEDURE — 00952 ANES VAG PX HYSTSC&/HSG: CPT | Performed by: OBSTETRICS & GYNECOLOGY

## 2023-08-23 PROCEDURE — D9220A PRA ANESTHESIA: Mod: ANES,,, | Performed by: ANESTHESIOLOGY

## 2023-08-23 PROCEDURE — D9220A PRA ANESTHESIA: ICD-10-PCS | Mod: ANES,,, | Performed by: ANESTHESIOLOGY

## 2023-08-23 PROCEDURE — 88305 TISSUE EXAM BY PATHOLOGIST: CPT | Mod: TC,SUR | Performed by: OBSTETRICS & GYNECOLOGY

## 2023-08-23 PROCEDURE — 86900 BLOOD TYPING SEROLOGIC ABO: CPT | Performed by: OBSTETRICS & GYNECOLOGY

## 2023-08-23 PROCEDURE — 25000003 PHARM REV CODE 250: Performed by: NURSE ANESTHETIST, CERTIFIED REGISTERED

## 2023-08-23 PROCEDURE — 93005 ELECTROCARDIOGRAM TRACING: CPT

## 2023-08-23 PROCEDURE — 25000003 PHARM REV CODE 250: Performed by: OBSTETRICS & GYNECOLOGY

## 2023-08-23 PROCEDURE — 58558 PR HYSTEROSCOPY,W/ENDO BX: ICD-10-PCS | Mod: ,,, | Performed by: OBSTETRICS & GYNECOLOGY

## 2023-08-23 PROCEDURE — 93010 EKG 12-LEAD: ICD-10-PCS | Mod: ,,, | Performed by: STUDENT IN AN ORGANIZED HEALTH CARE EDUCATION/TRAINING PROGRAM

## 2023-08-23 PROCEDURE — 71000015 HC POSTOP RECOV 1ST HR: Performed by: OBSTETRICS & GYNECOLOGY

## 2023-08-23 PROCEDURE — 88142 CYTOPATH C/V THIN LAYER: CPT | Mod: TC,GCY | Performed by: OBSTETRICS & GYNECOLOGY

## 2023-08-23 PROCEDURE — 81025 URINE PREGNANCY TEST: CPT | Performed by: OBSTETRICS & GYNECOLOGY

## 2023-08-23 PROCEDURE — 71000033 HC RECOVERY, INTIAL HOUR: Performed by: OBSTETRICS & GYNECOLOGY

## 2023-08-23 PROCEDURE — C1782 MORCELLATOR: HCPCS | Performed by: OBSTETRICS & GYNECOLOGY

## 2023-08-23 PROCEDURE — 63600175 PHARM REV CODE 636 W HCPCS: Performed by: ANESTHESIOLOGY

## 2023-08-23 PROCEDURE — D9220A PRA ANESTHESIA: ICD-10-PCS | Mod: CRNA,,, | Performed by: ANESTHESIOLOGY

## 2023-08-23 PROCEDURE — 37000009 HC ANESTHESIA EA ADD 15 MINS: Performed by: OBSTETRICS & GYNECOLOGY

## 2023-08-23 PROCEDURE — 36000707: Performed by: OBSTETRICS & GYNECOLOGY

## 2023-08-23 PROCEDURE — 63600175 PHARM REV CODE 636 W HCPCS: Performed by: NURSE ANESTHETIST, CERTIFIED REGISTERED

## 2023-08-23 PROCEDURE — 71000016 HC POSTOP RECOV ADDL HR: Performed by: OBSTETRICS & GYNECOLOGY

## 2023-08-23 PROCEDURE — 85025 COMPLETE CBC W/AUTO DIFF WBC: CPT | Performed by: OBSTETRICS & GYNECOLOGY

## 2023-08-23 PROCEDURE — D9220A PRA ANESTHESIA: Mod: CRNA,,, | Performed by: ANESTHESIOLOGY

## 2023-08-23 PROCEDURE — 37000008 HC ANESTHESIA 1ST 15 MINUTES: Performed by: OBSTETRICS & GYNECOLOGY

## 2023-08-23 PROCEDURE — 88305 TISSUE EXAM BY PATHOLOGIST: CPT | Mod: 26,,, | Performed by: PATHOLOGY

## 2023-08-23 PROCEDURE — 88305 SURGICAL PATHOLOGY: ICD-10-PCS | Mod: 26,,, | Performed by: PATHOLOGY

## 2023-08-23 RX ORDER — HYDROCODONE BITARTRATE AND ACETAMINOPHEN 5; 325 MG/1; MG/1
1 TABLET ORAL EVERY 4 HOURS PRN
Qty: 20 TABLET | Refills: 0 | Status: ON HOLD | OUTPATIENT
Start: 2023-08-23 | End: 2023-10-25

## 2023-08-23 RX ORDER — ONDANSETRON 4 MG/1
4 TABLET, FILM COATED ORAL ONCE
Status: COMPLETED | OUTPATIENT
Start: 2023-08-23 | End: 2023-08-23

## 2023-08-23 RX ORDER — TRANEXAMIC ACID 100 MG/ML
INJECTION, SOLUTION INTRAVENOUS
Status: DISCONTINUED | OUTPATIENT
Start: 2023-08-23 | End: 2023-08-23

## 2023-08-23 RX ORDER — DIMENHYDRINATE 50 MG
50 TABLET ORAL
Status: DISCONTINUED | OUTPATIENT
Start: 2023-08-23 | End: 2023-08-23 | Stop reason: HOSPADM

## 2023-08-23 RX ORDER — DIMENHYDRINATE 50 MG
50 TABLET ORAL NIGHTLY PRN
Status: DISCONTINUED | OUTPATIENT
Start: 2023-08-23 | End: 2023-08-23

## 2023-08-23 RX ORDER — LIDOCAINE HYDROCHLORIDE 20 MG/ML
INJECTION, SOLUTION EPIDURAL; INFILTRATION; INTRACAUDAL; PERINEURAL
Status: DISCONTINUED | OUTPATIENT
Start: 2023-08-23 | End: 2023-08-23

## 2023-08-23 RX ORDER — DOXYCYCLINE HYCLATE 100 MG
100 TABLET ORAL 2 TIMES DAILY
Status: DISCONTINUED | OUTPATIENT
Start: 2023-08-23 | End: 2023-08-23 | Stop reason: HOSPADM

## 2023-08-23 RX ORDER — SODIUM CHLORIDE 9 MG/ML
INJECTION, SOLUTION INTRAVENOUS CONTINUOUS
Status: DISCONTINUED | OUTPATIENT
Start: 2023-08-23 | End: 2023-08-23 | Stop reason: HOSPADM

## 2023-08-23 RX ORDER — DIPHENHYDRAMINE HYDROCHLORIDE 50 MG/ML
25 INJECTION INTRAMUSCULAR; INTRAVENOUS EVERY 6 HOURS PRN
Status: DISCONTINUED | OUTPATIENT
Start: 2023-08-23 | End: 2023-08-23 | Stop reason: HOSPADM

## 2023-08-23 RX ORDER — CEFAZOLIN SODIUM 1 G/3ML
INJECTION, POWDER, FOR SOLUTION INTRAMUSCULAR; INTRAVENOUS
Status: DISCONTINUED | OUTPATIENT
Start: 2023-08-23 | End: 2023-08-23

## 2023-08-23 RX ORDER — DIAZEPAM 5 MG/1
10 TABLET ORAL
Status: COMPLETED | OUTPATIENT
Start: 2023-08-23 | End: 2023-08-23

## 2023-08-23 RX ORDER — MEPERIDINE HYDROCHLORIDE 25 MG/ML
25 INJECTION INTRAMUSCULAR; INTRAVENOUS; SUBCUTANEOUS EVERY 10 MIN PRN
Status: DISCONTINUED | OUTPATIENT
Start: 2023-08-23 | End: 2023-08-23 | Stop reason: HOSPADM

## 2023-08-23 RX ORDER — DEXAMETHASONE SODIUM PHOSPHATE 4 MG/ML
INJECTION, SOLUTION INTRA-ARTICULAR; INTRALESIONAL; INTRAMUSCULAR; INTRAVENOUS; SOFT TISSUE
Status: DISCONTINUED | OUTPATIENT
Start: 2023-08-23 | End: 2023-08-23

## 2023-08-23 RX ORDER — FAMOTIDINE 20 MG/1
20 TABLET, FILM COATED ORAL
Status: COMPLETED | OUTPATIENT
Start: 2023-08-23 | End: 2023-08-23

## 2023-08-23 RX ORDER — FENTANYL CITRATE 50 UG/ML
INJECTION, SOLUTION INTRAMUSCULAR; INTRAVENOUS
Status: DISCONTINUED | OUTPATIENT
Start: 2023-08-23 | End: 2023-08-23

## 2023-08-23 RX ORDER — MUPIROCIN 20 MG/G
OINTMENT TOPICAL
Status: DISCONTINUED | OUTPATIENT
Start: 2023-08-23 | End: 2023-08-23 | Stop reason: HOSPADM

## 2023-08-23 RX ORDER — ONDANSETRON 2 MG/ML
4 INJECTION INTRAMUSCULAR; INTRAVENOUS DAILY PRN
Status: COMPLETED | OUTPATIENT
Start: 2023-08-23 | End: 2023-08-23

## 2023-08-23 RX ORDER — ONDANSETRON 2 MG/ML
INJECTION INTRAMUSCULAR; INTRAVENOUS
Status: DISCONTINUED | OUTPATIENT
Start: 2023-08-23 | End: 2023-08-23

## 2023-08-23 RX ORDER — NORETHINDRONE 5 MG/1
5 TABLET ORAL 3 TIMES DAILY
Qty: 90 TABLET | Refills: 2 | Status: ON HOLD | OUTPATIENT
Start: 2023-08-23 | End: 2023-10-25

## 2023-08-23 RX ORDER — PROPOFOL 10 MG/ML
VIAL (ML) INTRAVENOUS
Status: DISCONTINUED | OUTPATIENT
Start: 2023-08-23 | End: 2023-08-23

## 2023-08-23 RX ORDER — MIDAZOLAM HYDROCHLORIDE 1 MG/ML
INJECTION INTRAMUSCULAR; INTRAVENOUS
Status: DISCONTINUED | OUTPATIENT
Start: 2023-08-23 | End: 2023-08-23

## 2023-08-23 RX ORDER — HYDROMORPHONE HYDROCHLORIDE 2 MG/ML
0.5 INJECTION, SOLUTION INTRAMUSCULAR; INTRAVENOUS; SUBCUTANEOUS EVERY 5 MIN PRN
Status: DISCONTINUED | OUTPATIENT
Start: 2023-08-23 | End: 2023-08-23 | Stop reason: HOSPADM

## 2023-08-23 RX ORDER — ACETAMINOPHEN 500 MG
1000 TABLET ORAL
Status: COMPLETED | OUTPATIENT
Start: 2023-08-23 | End: 2023-08-23

## 2023-08-23 RX ORDER — IPRATROPIUM BROMIDE AND ALBUTEROL SULFATE 2.5; .5 MG/3ML; MG/3ML
3 SOLUTION RESPIRATORY (INHALATION) ONCE
Status: DISCONTINUED | OUTPATIENT
Start: 2023-08-23 | End: 2023-08-23 | Stop reason: HOSPADM

## 2023-08-23 RX ADMIN — LIDOCAINE HYDROCHLORIDE 100 MG: 20 INJECTION, SOLUTION INTRAVENOUS at 01:08

## 2023-08-23 RX ADMIN — TRANEXAMIC ACID 500 MG: 100 INJECTION, SOLUTION INTRAVENOUS at 02:08

## 2023-08-23 RX ADMIN — MIDAZOLAM 2 MG: 1 INJECTION INTRAMUSCULAR; INTRAVENOUS at 01:08

## 2023-08-23 RX ADMIN — ONDANSETRON 4 MG: 2 INJECTION INTRAMUSCULAR; INTRAVENOUS at 01:08

## 2023-08-23 RX ADMIN — HYDROMORPHONE HYDROCHLORIDE 0.5 MG: 2 INJECTION, SOLUTION INTRAMUSCULAR; INTRAVENOUS; SUBCUTANEOUS at 03:08

## 2023-08-23 RX ADMIN — ONDANSETRON 4 MG: 2 INJECTION INTRAMUSCULAR; INTRAVENOUS at 05:08

## 2023-08-23 RX ADMIN — FAMOTIDINE 20 MG: 20 TABLET, FILM COATED ORAL at 12:08

## 2023-08-23 RX ADMIN — ACETAMINOPHEN 1000 MG: 500 TABLET ORAL at 12:08

## 2023-08-23 RX ADMIN — DEXAMETHASONE SODIUM PHOSPHATE 10 MG: 4 INJECTION, SOLUTION INTRA-ARTICULAR; INTRALESIONAL; INTRAMUSCULAR; INTRAVENOUS; SOFT TISSUE at 02:08

## 2023-08-23 RX ADMIN — DIAZEPAM 10 MG: 5 TABLET ORAL at 12:08

## 2023-08-23 RX ADMIN — SODIUM CHLORIDE: 9 INJECTION, SOLUTION INTRAVENOUS at 03:08

## 2023-08-23 RX ADMIN — CEFAZOLIN 2000 MG: 1 INJECTION, POWDER, FOR SOLUTION INTRAMUSCULAR; INTRAVENOUS; PARENTERAL at 01:08

## 2023-08-23 RX ADMIN — FENTANYL CITRATE 50 MCG: 50 INJECTION INTRAMUSCULAR; INTRAVENOUS at 02:08

## 2023-08-23 RX ADMIN — PROPOFOL 200 MG: 10 INJECTION, EMULSION INTRAVENOUS at 01:08

## 2023-08-23 RX ADMIN — ONDANSETRON HYDROCHLORIDE 4 MG: 4 TABLET, FILM COATED ORAL at 12:08

## 2023-08-23 RX ADMIN — ONDANSETRON 4 MG: 2 INJECTION INTRAMUSCULAR; INTRAVENOUS at 03:08

## 2023-08-23 RX ADMIN — SODIUM CHLORIDE: 9 INJECTION, SOLUTION INTRAVENOUS at 12:08

## 2023-08-23 RX ADMIN — FENTANYL CITRATE 50 MCG: 50 INJECTION INTRAMUSCULAR; INTRAVENOUS at 01:08

## 2023-08-23 NOTE — ANESTHESIA PROCEDURE NOTES
Intubation    Date/Time: 8/23/2023 1:27 PM    Performed by: Viky Baxter CRNA  Authorized by: Iain Cueto MD    Intubation:     Induction:  Intravenous    Intubated:  Postinduction    Mask Ventilation:  Easy mask    Attempts:  1    Attempted By:  CRNA    Difficult Airway Encountered?: No      Complications:  None    Airway Device:  Supraglottic airway/LMA    Airway Device Size:  4.0    Style/Cuff Inflation:  Cuffed (inflated to minimal occlusive pressure)    Placement Verified By:  Capnometry    Complicating Factors:  None    Findings Post-Intubation:  BS equal bilateral and atraumatic/condition of teeth unchanged

## 2023-08-23 NOTE — TRANSFER OF CARE
"Anesthesia Transfer of Care Note    Patient: Kaylene Pryor    Procedure(s) Performed: Procedure(s) (LRB):  HYSTEROSCOPY, WITH DILATION AND CURETTAGE, polypectomy  OF UTERUS and pap smear (N/A)    Patient location: PACU    Anesthesia Type: general    Transport from OR: Transported from OR on 6-10 L/min O2 by face mask with adequate spontaneous ventilation    Post pain: adequate analgesia    Post assessment: no apparent anesthetic complications    Post vital signs: stable    Level of consciousness: responds to stimulation and awake    Nausea/Vomiting: no nausea/vomiting    Complications: none    Transfer of care protocol was followedComments: Good SV continue, NAD noted, VSS, RTRN      Last vitals:   Visit Vitals  /81   Pulse 89   Temp 36.6 °C (97.9 °F) (Oral)   Resp 15   Ht 5' 8" (1.727 m)   Wt 111.1 kg (245 lb)   LMP  (LMP Unknown)   SpO2 95%   Breastfeeding No   BMI 37.25 kg/m²     "

## 2023-08-23 NOTE — INTERVAL H&P NOTE
The patient has been examined and the H&P has been reviewed:    I concur with the findings and no changes have occurred since H&P was written.    Anesthesia/Surgery risks, benefits and alternative options discussed and understood by patient/family.          Active Hospital Problems    Diagnosis  POA    *Abnormal uterine bleeding [N93.9]  Yes     Mussed june period. Been bleeding since july 2      Fibroid [D21.9]  Yes     Told at ER i have a medium size one      Dysmenorrhea [N94.6]  Yes      Resolved Hospital Problems   No resolved problems to display.

## 2023-08-23 NOTE — BRIEF OP NOTE
[facility] - Periop Services  Brief Operative Note    Surgery Date: 8/23/2023     Surgeon(s) and Role:     * Sophy Milligan MD - Primary    Assisting Surgeon: None    Pre-op Diagnosis:  Abnormal uterine bleeding [N93.9]    Post-op Diagnosis:  Post-Op Diagnosis Codes:     * Abnormal uterine bleeding [N93.9]     * Endometrial polyp [N84.0]    Procedure(s) (LRB):  HYSTEROSCOPY, WITH DILATION AND CURETTAGE, polypectomy  OF UTERUS and pap smear (N/A)    Anesthesia: General    Operative Findings: Thickened endometrium, multiple endometrial polyps    Estimated Blood Loss: 200 mL         Specimens: Endometrial polyps, endometrial curettings  Specimen (24h ago, onward)       Start     Ordered    08/23/23 1500  Surgical Pathology  RELEASE UPON ORDERING         08/23/23 1500    08/23/23 1350  ThinPrep Pap Test  Once        Question Answer Comment   Perform GC/CH Testing? No    Perform HR-HPV Test? No    Clinical History: unsatisfactory pap    Has the Patient Had Previous Cytology? Yes    What was the date of Last Cytology? 8/10/2023    Previous Cytology Result: Negative    Is the patient pregnant? No    Is the patient on birth control pills? No    Does the patient have an IUD? No    Is the patient undergoing Hormone Therapy? No    Is the patient experiencing abnormal bleeding? Yes    Release to patient Immediate        08/23/23 1404                      Discharge Note    OUTCOME: Patient tolerated treatment/procedure well without complication and is now ready for discharge.    DISPOSITION: Home or Self Care    FINAL DIAGNOSIS:  Abnormal uterine bleeding    FOLLOWUP: In clinic    DISCHARGE INSTRUCTIONS:  No discharge procedures on file.

## 2023-08-23 NOTE — OP NOTE
Surgery Date: 8/23/2023      Surgeon(s) and Role:     * Sophy Milligan MD - Primary     Assisting Surgeon: None     Pre-op Diagnosis:  Abnormal uterine bleeding [N93.9]     Post-op Diagnosis:  Post-Op Diagnosis Codes:     * Abnormal uterine bleeding [N93.9]     * Endometrial polyp [N84.0]     Procedure(s) (LRB):  HYSTEROSCOPY, WITH DILATION AND CURETTAGE, polypectomy  OF UTERUS and pap smear (N/A)     Anesthesia: General     Operative Findings: Thickened endometrium, multiple endometrial polyps     Estimated Blood Loss: 200 mL         Specimens: Endometrial polyps, endometrial curettings  Specimen (24h ago, onward)          Start     Ordered     08/23/23 1500   Surgical Pathology  RELEASE UPON ORDERING         08/23/23 1500     08/23/23 1350   ThinPrep Pap Test  Once        Question Answer Comment   Perform GC/CH Testing? No     Perform HR-HPV Test? No     Clinical History: unsatisfactory pap     Has the Patient Had Previous Cytology? Yes     What was the date of Last Cytology? 8/10/2023     Previous Cytology Result: Negative     Is the patient pregnant? No     Is the patient on birth control pills? No     Does the patient have an IUD? No     Is the patient undergoing Hormone Therapy? No     Is the patient experiencing abnormal bleeding? Yes     Release to patient Immediate         08/23/23 1404         Procedure description:      The patient was taken to the operating room with IV fluids running and pneumatic compression stockings applied to the lower extremities and turned on prior to the beginning of the procedure. General anesthesia was obtained without difficulty. The patient was then placed in the dorsal lithotomy position with Sam stirrups. She was prepped and draped in the normal sterile fashion. A time-out was then performed with Sophy Milligan MD present.    A weighted speculum and right angle retractor were placed into the vagina. The anterior lip of the cervix was grasped with a single tooth  tenaculum. The uterus was then sounded to 10 cm. The cervix was sequentially dilated using Hanks dilators. Next, the hysteroscope was inserted into the uterus and the above findings were noted.  The hysteroscope was then withdrawn.    MyoSure device was inserted into the endometrial cavity and a polypectomy and directed biopsies were performed.  The was polyps found to be about 75% removed.The resection site was inspected and excellent hemostasis was noted. There were issues with the camera during this section of the procedure and this had to be stopped prematurely, then attention was turned to the dilation and curettage.    Then a D&C was then performed using a medium-sized curette rotating on the outward fashion. Endometrial curettings were then sent to Pathology. There was a copious amount of tissue and clots that were expressed during the dilation and curettage. She was given 1 g TXA during this portion due to moderate bleeding. The bleeding did  eventually slow down.    Then a PAP was performed. This was sent to pathology. Her previous PAP was unsatisfactory so this was a repeat PAP.    Next, the single tooth tenaculum was removed from the anterior lip of the cervix and excellent hemostasis was noted after silver nitrate applied. All the instruments were removed from the vagina and instrument and sponge counts were correct x2. The patient was awakened from general anesthesia and taken to the recovery room in stable condition.     Due to moderate bleeding during the case, will give Norethindrone 15 mg daily.

## 2023-08-23 NOTE — DISCHARGE SUMMARY
[facility] - Periop Services  Discharge Note  Short Stay    Procedure(s) (LRB):  HYSTEROSCOPY, WITH DILATION AND CURETTAGE, polypectomy  OF UTERUS and pap smear (N/A)      OUTCOME: Patient tolerated treatment/procedure well without complication and is now ready for discharge.    DISPOSITION: Home or Self Care    FINAL DIAGNOSIS:  Abnormal uterine bleeding    FOLLOWUP: In clinic    DISCHARGE INSTRUCTIONS:    Discharge Procedure Orders   Diet Adult Regular     No driving until:   Order Comments: Until > 24 hours post surgery and not on Norco.     Pelvic Rest   Order Comments: X 1-2 weeks or until bleeding stops.     Notify your health care provider if you experience any of the following:  temperature >100.4     Notify your health care provider if you experience any of the following:  persistent nausea and vomiting or diarrhea     Notify your health care provider if you experience any of the following:  severe uncontrolled pain     Notify your health care provider if you experience any of the following:  difficulty breathing or increased cough     Notify your health care provider if you experience any of the following:  severe persistent headache     Notify your health care provider if you experience any of the following:  worsening rash     Notify your health care provider if you experience any of the following:  persistent dizziness, light-headedness, or visual disturbances     Notify your health care provider if you experience any of the following:  increased confusion or weakness     Notify your health care provider if you experience any of the following:   Order Comments: Vaginal bleeding > 1 pad per hour, foul smelling vaginal discharge, or any other acute changes.        TIME SPENT ON DISCHARGE: 20 minutes

## 2023-08-23 NOTE — ANESTHESIA PREPROCEDURE EVALUATION
08/23/2023  Kaylene Pryor is a 42 y.o., female.      Pre-op Assessment    I have reviewed the Patient Summary Reports.     I have reviewed the Nursing Notes. I have reviewed the NPO Status.   I have reviewed the Medications.     Review of Systems  Anesthesia Hx:  Denies Family Hx of Anesthesia complications.   Denies Personal Hx of Anesthesia complications.   Social:  Non-Smoker, No Alcohol Use    Hematology/Oncology:  Hematology Normal   Oncology Normal     EENT/Dental:EENT/Dental Normal   Cardiovascular:   Hypertension    Pulmonary:  Pulmonary Normal    Renal/:  Renal/ Normal     Hepatic/GI:  Hepatic/GI Normal    Musculoskeletal:  Musculoskeletal Normal    Neurological:  Neurology Normal    Endocrine:  Endocrine Normal  Obesity / BMI > 30  Dermatological:  Skin Normal    Psych:  Psychiatric Normal           Physical Exam  General: Well nourished, Cooperative, Alert and Oriented    Airway:  Mallampati: II / II  Mouth Opening: Normal  TM Distance: Normal  Neck ROM: Normal ROM    Dental:  Intact    Chest/Lungs:  Clear to auscultation    Heart:  Rate: Normal  Rhythm: Regular Rhythm  Sounds: Normal        Chemistry        Component Value Date/Time     07/20/2023 2133    K 3.9 07/20/2023 2133     07/20/2023 2133    CO2 30 07/20/2023 2133    BUN 13 07/20/2023 2133    CREATININE 0.83 07/20/2023 2133     (H) 07/20/2023 2133        Component Value Date/Time    CALCIUM 9.1 07/20/2023 2133    ALKPHOS 60 07/20/2023 2133    AST 14 (L) 07/20/2023 2133    ALT 24 07/20/2023 2133    BILITOT 0.3 07/20/2023 2133        Lab Results   Component Value Date    WBC 11.09 (H) 08/23/2023    HGB 14.8 08/23/2023    HCT 43.4 08/23/2023     (H) 08/23/2023     No results found for this or any previous visit.      Anesthesia Plan  Type of Anesthesia, risks & benefits discussed:    Anesthesia Type: Gen  Supraglottic Airway  Intra-op Monitoring Plan: Standard ASA Monitors  Post Op Pain Control Plan: multimodal analgesia  Induction:  IV  Airway Plan: Direct  Informed Consent: Informed consent signed with the Patient and all parties understand the risks and agree with anesthesia plan.  All questions answered.   ASA Score: 2  Day of Surgery Review of History & Physical: H&P Update referred to the surgeon/provider.I have interviewed and examined the patient. I have reviewed the patient's H&P dated: There are no significant changes.     Ready For Surgery From Anesthesia Perspective.     .

## 2023-08-24 NOTE — ANESTHESIA POSTPROCEDURE EVALUATION
Anesthesia Post Evaluation    Patient: Kaylene Pryor    Procedure(s) Performed: Procedure(s) (LRB):  HYSTEROSCOPY, WITH DILATION AND CURETTAGE (N/A)  POLYPECTOMY, UTERUS, HYSTEROSCOPIC    Final Anesthesia Type: general      Patient location during evaluation: PACU  Patient participation: Yes- Able to Participate  Level of consciousness: awake and alert  Post-procedure vital signs: reviewed and stable  Pain management: adequate  Airway patency: patent  BRIANNA mitigation strategies: Multimodal analgesia  PONV status at discharge: No PONV  Anesthetic complications: no      Cardiovascular status: blood pressure returned to baseline  Respiratory status: unassisted  Hydration status: euvolemic  Follow-up not needed.          Vitals Value Taken Time   /76 08/23/23 1646   Temp 36.6 °C (97.9 °F) 08/23/23 1515   Pulse 98 08/23/23 1648   Resp 14 08/23/23 1645   SpO2 97 % 08/23/23 1648   Vitals shown include unvalidated device data.      Event Time   Out of Recovery 16:10:00         Pain/Milan Score: Pain Rating Prior to Med Admin: 6 (8/23/2023  3:55 PM)  Milan Score: 10 (8/23/2023  4:20 PM)

## 2023-08-25 ENCOUNTER — TELEPHONE (OUTPATIENT)
Dept: OBSTETRICS AND GYNECOLOGY | Facility: CLINIC | Age: 43
End: 2023-08-25
Payer: OTHER GOVERNMENT

## 2023-08-25 NOTE — TELEPHONE ENCOUNTER
Called patient to check on her after surgery. She is doing well. Pain is controlled. She stopped bleeding yesterday. She has Aygestin to take if she starts having bleeding again. She had a low grade temp yesterday with a Tmax of 99.5.   Reviewed that her pathology was benign. PAP again was unsatisfactory due to bleeding. Will discuss option of repeat in the office at her postop visit vs. Just proceeding with the hysterectomy with negative HPV and endometrial sampling negative.   All questions answered. F/u next week as scheduled.

## 2023-08-28 LAB
ESTROGEN SERPL-MCNC: NORMAL PG/ML
INSULIN SERPL-ACNC: NORMAL U[IU]/ML
LAB AP GROSS DESCRIPTION: NORMAL
LAB AP LABORATORY NOTES: NORMAL
T3RU NFR SERPL: NORMAL %

## 2023-08-29 ENCOUNTER — OFFICE VISIT (OUTPATIENT)
Dept: FAMILY MEDICINE | Facility: CLINIC | Age: 43
End: 2023-08-29
Payer: OTHER GOVERNMENT

## 2023-08-29 VITALS
HEIGHT: 68 IN | SYSTOLIC BLOOD PRESSURE: 135 MMHG | WEIGHT: 248 LBS | BODY MASS INDEX: 37.59 KG/M2 | HEART RATE: 94 BPM | DIASTOLIC BLOOD PRESSURE: 90 MMHG | RESPIRATION RATE: 20 BRPM | OXYGEN SATURATION: 98 %

## 2023-08-29 DIAGNOSIS — I10 PRIMARY HYPERTENSION: Primary | ICD-10-CM

## 2023-08-29 PROCEDURE — 99213 OFFICE O/P EST LOW 20 MIN: CPT | Mod: ,,, | Performed by: FAMILY MEDICINE

## 2023-08-29 PROCEDURE — 99213 PR OFFICE/OUTPT VISIT, EST, LEVL III, 20-29 MIN: ICD-10-PCS | Mod: ,,, | Performed by: FAMILY MEDICINE

## 2023-08-29 RX ORDER — VALSARTAN 80 MG/1
80 TABLET ORAL DAILY
Qty: 90 TABLET | Refills: 3 | Status: SHIPPED | OUTPATIENT
Start: 2023-08-29 | End: 2023-10-23

## 2023-08-29 NOTE — PROGRESS NOTES
Jamari Stout MD        PATIENT NAME: Kaylene Pryor  : 1980  DATE: 23  MRN: [id number]      Billing Provider: Jamari Stout MD  Level of Service: RI OFFICE/OUTPT VISIT, EST, LEVL III, 20-29 MIN  Patient PCP Information       Provider PCP Type    Jamari Stout MD General            Reason for Visit / Chief Complaint: Hypertension (Follow up. started new bp med)       Update PCP  Update Chief Complaint         History of Present Illness / Problem Focused Workflow     Kaylene Pryor presents to the clinic with Hypertension (Follow up. started new bp med)     Routine followup.  No significant interval change      Hypertension  Pertinent negatives include no chest pain, headaches, neck pain or palpitations.     Review of Systems     Review of Systems   Constitutional:  Negative for activity change, appetite change, fever and unexpected weight change.   HENT:  Negative for congestion, rhinorrhea, sinus pressure, sinus pain, sore throat and trouble swallowing.    Eyes:  Negative for photophobia, pain, discharge and visual disturbance.   Respiratory:  Negative for cough, chest tightness, wheezing and stridor.    Cardiovascular:  Negative for chest pain, palpitations and leg swelling.   Gastrointestinal:  Negative for abdominal pain, blood in stool, constipation, diarrhea and nausea.   Endocrine: Negative for polydipsia, polyphagia and polyuria.   Genitourinary:  Negative for difficulty urinating, flank pain and hematuria.   Musculoskeletal:  Negative for arthralgias and neck pain.   Skin:  Negative for rash.   Allergic/Immunologic: Negative for food allergies.   Neurological:  Negative for dizziness, tremors, seizures, syncope, weakness (global weakness) and headaches.   Psychiatric/Behavioral:  Negative for behavioral problems, confusion, decreased concentration, dysphoric mood and hallucinations. The patient is not nervous/anxious.         Medical / Social / Family History     Past Medical History:    Diagnosis Date    Abnormal uterine bleeding 2023    Mussed mercy period. Been bleeding since     Fibroid     Told at ER i have a medium size one    Hypertension 2023    Just started meds    Obesity (BMI 35.0-39.9 without comorbidity)        Past Surgical History:   Procedure Laterality Date     SECTION      CHOLECYSTECTOMY      COLPOSCOPY  Not sure    COSMETIC SURGERY   and     abdominoplasty    HYSTEROSCOPIC POLYPECTOMY OF UTERUS  2023    Procedure: POLYPECTOMY, UTERUS, HYSTEROSCOPIC;  Surgeon: Sophy Milligan MD;  Location: [facility] OR;  Service: OB/GYN;;  PAP SMEAR    HYSTEROSCOPY WITH DILATION AND CURETTAGE OF UTERUS N/A 2023    Procedure: HYSTEROSCOPY, WITH DILATION AND CURETTAGE;  Surgeon: Sophy Milligan MD;  Location: [facility] OR;  Service: OB/GYN;  Laterality: N/A;  with myosure    LIPOSUCTION OF ABDOMEN      TONSILLECTOMY         Social History  Ms.  reports that she has never smoked. She has never been exposed to tobacco smoke. She has never used smokeless tobacco. She reports that she does not drink alcohol and does not use drugs.    Family History  Ms.'s family history includes Asthma in her mother; Cancer in her maternal grandfather and mother; Diabetes in her father; Hypertension in her maternal grandmother and mother.    Medications and Allergies     Medications  No outpatient medications have been marked as taking for the 23 encounter (Office Visit) with Jamari Stout MD.       Allergies  Review of patient's allergies indicates:   Allergen Reactions    Opioids - morphine analogues Nausea And Vomiting       Physical Examination     Vitals:    23 1601   BP: (!) 135/90   Pulse:    Resp:      Physical Exam  Constitutional:       General: She is not in acute distress.     Appearance: Normal appearance.   HENT:      Head: Normocephalic.      Right Ear: Tympanic membrane and ear canal normal.      Left Ear: Tympanic membrane and  ear canal normal.      Nose: Nose normal.      Mouth/Throat:      Mouth: Mucous membranes are moist.      Pharynx: No oropharyngeal exudate.   Eyes:      Extraocular Movements: Extraocular movements intact.      Pupils: Pupils are equal, round, and reactive to light.   Cardiovascular:      Rate and Rhythm: Normal rate and regular rhythm.      Heart sounds: No murmur heard.  Pulmonary:      Effort: Pulmonary effort is normal.      Breath sounds: Normal breath sounds. No wheezing.   Abdominal:      General: Abdomen is flat. Bowel sounds are normal.      Palpations: Abdomen is soft.      Hernia: No hernia is present.   Musculoskeletal:         General: Normal range of motion.      Cervical back: Normal range of motion and neck supple.      Right lower leg: No edema.      Left lower leg: No edema.   Lymphadenopathy:      Cervical: No cervical adenopathy.   Skin:     General: Skin is warm and dry.      Coloration: Skin is not jaundiced.      Findings: No lesion.   Neurological:      General: No focal deficit present.      Mental Status: She is alert and oriented to person, place, and time.      Cranial Nerves: No cranial nerve deficit.      Gait: Gait normal.   Psychiatric:         Mood and Affect: Mood normal.         Behavior: Behavior normal.         Judgment: Judgment normal.          Assessment and Plan (including Health Maintenance)      Problem List  Smart Sets  Document Outside HM   :    Plan:           Health Maintenance Due   Topic Date Due    Hepatitis C Screening  Never done    HIV Screening  Never done    TETANUS VACCINE  Never done    Mammogram  Never done    Hemoglobin A1c (Diabetic Prevention Screening)  Never done       Problem List Items Addressed This Visit          Cardiac/Vascular    Primary hypertension - Primary    Relevant Medications    valsartan (DIOVAN) 80 MG tablet       Health Maintenance Topics with due status: Not Due       Topic Last Completion Date    Cervical Cancer Screening 08/23/2023     Influenza Vaccine Not Due       Future Appointments   Date Time Provider Department Center   8/31/2023 10:30 AM Sophy Milligan MD [facility]   9/28/2023 12:45 PM [facility] [place]        There are no Patient Instructions on file for this visit.  Follow up in about 4 weeks (around 9/26/2023) for routine followup.     Signature:  Jamari Stout MD      Date of encounter: 8/29/23

## 2023-09-05 ENCOUNTER — OFFICE VISIT (OUTPATIENT)
Dept: OBSTETRICS AND GYNECOLOGY | Facility: CLINIC | Age: 43
End: 2023-09-05
Payer: OTHER GOVERNMENT

## 2023-09-05 VITALS
BODY MASS INDEX: 37.74 KG/M2 | WEIGHT: 249 LBS | SYSTOLIC BLOOD PRESSURE: 132 MMHG | OXYGEN SATURATION: 98 % | HEART RATE: 78 BPM | RESPIRATION RATE: 18 BRPM | HEIGHT: 68 IN | DIASTOLIC BLOOD PRESSURE: 79 MMHG

## 2023-09-05 DIAGNOSIS — N93.9 ABNORMAL UTERINE BLEEDING: ICD-10-CM

## 2023-09-05 DIAGNOSIS — N39.3 SUI (STRESS URINARY INCONTINENCE, FEMALE): ICD-10-CM

## 2023-09-05 DIAGNOSIS — Z48.89 ENCOUNTER FOR POSTOPERATIVE CARE: Primary | ICD-10-CM

## 2023-09-05 PROCEDURE — 99024 PR POST-OP FOLLOW-UP VISIT: ICD-10-PCS | Mod: ,,, | Performed by: OBSTETRICS & GYNECOLOGY

## 2023-09-05 PROCEDURE — 99214 OFFICE O/P EST MOD 30 MIN: CPT | Mod: PBBFAC | Performed by: OBSTETRICS & GYNECOLOGY

## 2023-09-05 PROCEDURE — 99024 POSTOP FOLLOW-UP VISIT: CPT | Mod: ,,, | Performed by: OBSTETRICS & GYNECOLOGY

## 2023-09-05 NOTE — PROGRESS NOTES
"Postoperative Note    Assessment/Plan:  42 y.o. with AUB s/p hysteroscopy with Myosure polpectomy and dilation and curettage 2 weeks ago, stable.  Discussed her pathology was benign.  PAP is still unsatisfactory. Plan for Dr. Matson to repeat PAP at her visit with him.     Stress urinary incontinence--refer to Dr. Matson for work up to see if she would be a candidate for joint surgery if needed.    Continue Aygestin prn until surgery.  RTC the week of October 23rd for pre-op visit.      CC:   Chief Complaint   Patient presents with    Post-op Evaluation       Subjective:  S/p hysteroscopy with Myosure polpectomy and dilation and curettage 2 weeks ago, stable.    Pt seen and examined.  Doing well. No concerns complaints. Pain well controlled. Bowel and bladder functioning well. She is only having intermittent spotting that resolves when she takes the Aygestin. Her pain has improved. She feels more comfortable going on her upcoming trips.  She did bring up today that she has leakage of urine when she coughs or sneezes or laughs. This has been a chronic problem. She desires a work up for this as well.    Review of Systems - The following ROS was otherwise negative, except as noted in the HPI:  constitutional, respiratory, cardiovascular, gastrointestinal, genitourinary    Objective:  /79   Pulse 78   Resp 18   Ht 5' 8" (1.727 m)   Wt 112.9 kg (249 lb)   LMP  (LMP Unknown) Comment: bleeding since 7-2-23  SpO2 98%   BMI 37.86 kg/m²   General:  Alert, well appearing, no acute distress  Abdomen:  Soft, appropriately tender to palpation, non-distended  Extremities:  No redness or tenderness in LE, neg Michael's sign    Path:   Final Diagnosis   A. Endometrial polyp, biopsy:  - Fragments of endometrial polyp(s) with acute inflammation and degeneration  - Fragments of endocervical tissue without diagnostic abnormality     B. Endometrial curettings:    - Fragments of endometrial polyp(s) with acute inflammation and " degeneration  - Fragments of endocervical tissue without diagnostic abnormality     Specimens (From admission, onward)      None

## 2023-09-13 ENCOUNTER — TELEPHONE (OUTPATIENT)
Dept: OBSTETRICS AND GYNECOLOGY | Facility: CLINIC | Age: 43
End: 2023-09-13
Payer: OTHER GOVERNMENT

## 2023-09-13 DIAGNOSIS — R32 URINARY INCONTINENCE, UNSPECIFIED TYPE: Primary | ICD-10-CM

## 2023-09-13 NOTE — TELEPHONE ENCOUNTER
Left voicemail.  Asked patient to call me regarding scheduling bladder stress test.    We have been unable to reach patient by multiple phone calls.

## 2023-09-13 NOTE — TELEPHONE ENCOUNTER
Have contacted patient via [employer]. Patient stated she is out of town and 09/25/2023 will work best. I scheduled patient for 09/25/2023 at 1330 and sent her a [employer] with date and time.    ----- Message from Amira Milligan sent at 9/13/2023  2:36 PM CDT -----  Pt returning missed call to staff - call back # [phone]

## 2023-09-25 ENCOUNTER — PROCEDURE VISIT (OUTPATIENT)
Dept: OBSTETRICS AND GYNECOLOGY | Facility: CLINIC | Age: 43
End: 2023-09-25
Payer: OTHER GOVERNMENT

## 2023-09-25 VITALS
HEIGHT: 68 IN | DIASTOLIC BLOOD PRESSURE: 80 MMHG | BODY MASS INDEX: 39.4 KG/M2 | SYSTOLIC BLOOD PRESSURE: 138 MMHG | WEIGHT: 260 LBS

## 2023-09-25 DIAGNOSIS — N92.1 MENORRHAGIA WITH IRREGULAR CYCLE: Primary | ICD-10-CM

## 2023-09-25 DIAGNOSIS — N39.46 MIXED STRESS AND URGE URINARY INCONTINENCE: ICD-10-CM

## 2023-09-25 DIAGNOSIS — R87.615 UNSATISFACTORY CERVICAL PAPANICOLAOU SMEAR: ICD-10-CM

## 2023-09-25 DIAGNOSIS — N39.3 SUI (STRESS URINARY INCONTINENCE, FEMALE): ICD-10-CM

## 2023-09-25 PROCEDURE — 87086 CULTURE, URINE: ICD-10-PCS | Mod: ,,, | Performed by: CLINICAL MEDICAL LABORATORY

## 2023-09-25 PROCEDURE — 87086 URINE CULTURE/COLONY COUNT: CPT | Mod: ,,, | Performed by: CLINICAL MEDICAL LABORATORY

## 2023-09-25 PROCEDURE — 51725 SIMPLE CYSTOMETROGRAM: CPT | Mod: 26,S$PBB,, | Performed by: OBSTETRICS & GYNECOLOGY

## 2023-09-25 PROCEDURE — 88142 CYTOPATH C/V THIN LAYER: CPT | Mod: TC,GCY | Performed by: OBSTETRICS & GYNECOLOGY

## 2023-09-25 PROCEDURE — 51725 SIMPLE CYSTOMETROGRAM: CPT | Mod: PBBFAC | Performed by: OBSTETRICS & GYNECOLOGY

## 2023-09-25 PROCEDURE — 51725 PR SIMPLE CYSTOMETROGRAM: ICD-10-PCS | Mod: 26,S$PBB,, | Performed by: OBSTETRICS & GYNECOLOGY

## 2023-09-25 NOTE — PATIENT INSTRUCTIONS
Discussed proceeding with mid urethral sling procedure.      Risks versus benefits of procedure thoroughly discussed.      Success rates of procedure discussed.          Follow-up with Dr. Milligan regarding preoperative appointment.

## 2023-09-25 NOTE — PROCEDURES
Procedures    Patient referred by Dr. Milligan for evaluation of urinary incontinence.      By history she leaks as she laughs coughs or sneezes.  Often wears pads secondary to incontinence.      She has a history of menorrhagia and dysmenorrhea.  Enlarged uterus consistent with small fibroids noted.  On ultrasound.      She has been scheduled for robotic hysterectomy with Dr. Milligan.      She is had 1 vaginal delivery 1  section.    Procedure-bladder stress test    Speculum exam was performed cervix normal to appearance repeat Pap taken.  The anterior and posterior aspects of the vaginal vault were examined with 1/2 of speculum.  She has approximally a first-degree cystocele first-degree rectocele.  However she definitely has hypermobility of the urethral neck with Valsalva.    She was allowed to void.  Urethra prepped Betadine She was then catheterized.  She had only approximally 2-3 cc clear urine.  We will send for culture.      At approximally 75 cc she had the urge to void.  However bladder continued to fill continuously until 300 cc.  I saw no obvious bladder contractions during filling.    Catheter was removed.  She was allowed to cough.  Profuse incontinence was noted with each cough.  Hypermobility of the urethral neck again noted.      Patient was allowed to void.  Later in the office I discussed proceeding with mid urethral sling procedure at the time of her hysterectomy.      Pictures were reviewed.    Discussed success rates of procedure 85% chance of stopping incontinence 11% chance of decreasing incontinence 3-4% chance of failure.    Potential risk of vaginal or urethral erosion discussed potential risk of chronic urinary tract infections chronic pain syndrome discussed.      Potential risk of bladder injury at the time of mid urethral sling discussed.      Patient understands all questions were answered.  She desires proceeding with mid urethral sling procedure at the time of hysterectomy.

## 2023-09-27 LAB
GH SERPL-MCNC: NORMAL NG/ML
INSULIN SERPL-ACNC: NORMAL U[IU]/ML
LAB AP CLINICAL INFORMATION: NORMAL
LAB AP GYN INTERPRETATION: NEGATIVE
LAB AP PAP DISCLAIMER COMMENTS: NORMAL
RENIN PLAS-CCNC: NORMAL NG/ML/H
UA COMPLETE W REFLEX CULTURE PNL UR: NO GROWTH

## 2023-09-28 ENCOUNTER — HOSPITAL ENCOUNTER (OUTPATIENT)
Dept: RADIOLOGY | Facility: HOSPITAL | Age: 43
Discharge: HOME | End: 2023-09-28
Attending: FAMILY MEDICINE
Payer: OTHER GOVERNMENT

## 2023-09-28 VITALS — WEIGHT: 260 LBS | BODY MASS INDEX: 40.81 KG/M2 | HEIGHT: 67 IN

## 2023-09-28 DIAGNOSIS — Z12.39 BREAST SCREENING: ICD-10-CM

## 2023-09-28 PROCEDURE — 77067 SCR MAMMO BI INCL CAD: CPT | Mod: TC

## 2023-10-21 ENCOUNTER — PATIENT MESSAGE (OUTPATIENT)
Dept: FAMILY MEDICINE | Facility: CLINIC | Age: 43
End: 2023-10-21
Payer: OTHER GOVERNMENT

## 2023-10-23 ENCOUNTER — OFFICE VISIT (OUTPATIENT)
Dept: OBSTETRICS AND GYNECOLOGY | Facility: CLINIC | Age: 43
End: 2023-10-23
Payer: OTHER GOVERNMENT

## 2023-10-23 VITALS
OXYGEN SATURATION: 98 % | DIASTOLIC BLOOD PRESSURE: 68 MMHG | HEIGHT: 67 IN | SYSTOLIC BLOOD PRESSURE: 126 MMHG | HEART RATE: 85 BPM | WEIGHT: 267 LBS | BODY MASS INDEX: 41.91 KG/M2 | RESPIRATION RATE: 18 BRPM

## 2023-10-23 DIAGNOSIS — N39.46 MIXED STRESS AND URGE URINARY INCONTINENCE: ICD-10-CM

## 2023-10-23 DIAGNOSIS — N39.3 SUI (STRESS URINARY INCONTINENCE, FEMALE): ICD-10-CM

## 2023-10-23 DIAGNOSIS — N93.9 ABNORMAL UTERINE BLEEDING: Primary | ICD-10-CM

## 2023-10-23 DIAGNOSIS — N94.6 DYSMENORRHEA: ICD-10-CM

## 2023-10-23 PROCEDURE — 99214 PR OFFICE/OUTPT VISIT, EST, LEVL IV, 30-39 MIN: ICD-10-PCS | Mod: S$PBB,,, | Performed by: OBSTETRICS & GYNECOLOGY

## 2023-10-23 PROCEDURE — 99214 OFFICE O/P EST MOD 30 MIN: CPT | Mod: S$PBB,,, | Performed by: OBSTETRICS & GYNECOLOGY

## 2023-10-23 PROCEDURE — 99214 OFFICE O/P EST MOD 30 MIN: CPT | Mod: PBBFAC | Performed by: OBSTETRICS & GYNECOLOGY

## 2023-10-23 RX ORDER — SODIUM CHLORIDE 9 MG/ML
INJECTION, SOLUTION INTRAVENOUS CONTINUOUS
Status: CANCELLED | OUTPATIENT
Start: 2023-10-23

## 2023-10-23 RX ORDER — PHENAZOPYRIDINE HYDROCHLORIDE 100 MG/1
200 TABLET, FILM COATED ORAL
Status: CANCELLED | OUTPATIENT
Start: 2023-10-23 | End: 2023-10-23

## 2023-10-23 RX ORDER — MUPIROCIN 20 MG/G
OINTMENT TOPICAL
Status: CANCELLED | OUTPATIENT
Start: 2023-10-23

## 2023-10-23 RX ORDER — FAMOTIDINE 20 MG/1
20 TABLET, FILM COATED ORAL
Status: CANCELLED | OUTPATIENT
Start: 2023-10-23

## 2023-10-23 NOTE — PROGRESS NOTES
Gynecology History and Physical    Assessment/Plan:   Problem List Items Addressed This Visit          Renal/    Abnormal uterine bleeding - Primary    Overview     Mussed june period. Been bleeding since july 2         Dysmenorrhea     Other Visit Diagnoses       Mixed stress and urge urinary incontinence        AUDREY (stress urinary incontinence, female)              She desires definitive surgical management via a hysterectomy.   Discussed that we would plan a robotic assisted hysterectomy with bilateral salpingectomy and cystoscopy. Dr. Matson would then perform a mid urethral sling.   Discussed the benefits of cessation of menses and pain associated with menses. Discussed if there is pain from any other source, then this would not be improved with the hysterectomy. She verbalized her understanding of this. Discussed risks of bleeding, infection, damage to adjacent organs, risks of anesthesia. Discussed alternatives of medical management or to do nothing which she declines.     Discussed the benefits of the midurethral sling would be to decrease stress urinary incontinence. Per Dr. Matson's note:   Discussed success rates of procedure 85% chance of stopping incontinence 11% chance of decreasing incontinence 3-4% chance of failure.  Potential risk of vaginal or urethral erosion discussed potential risk of chronic urinary tract infections chronic pain syndrome discussed.    Potential risk of bladder injury at the time of mid urethral sling discussed.      She verbalized her understanding of the above and desires to proceed. All questions answered. Consents reviewed and signed.     Discussed that her case is at 0730 on 10/25/2023. Arrival time of 0530 discussed. NPO after 2330 the night prior.     Case again reviewed with Dr. Matson today who will be called at the cuff closure for his portion.     CC:   Chief Complaint   Patient presents with    Pre-op Exam     HPI: Kaylene Pryor is a 42 y.o. female who presents with  complaints of abnormal uterine bleeding and dysmenorrhea.  Since the hysteroscopy D&C on 23, her symptoms have improved. She has the Aygestin to take if the bleeding starts. This has been controlling it. Desires definitive surgical management.    She is still having painful cramps even though her bleeding is controlled.     She has been having moderate to severe vaginal bleeding since 2023. She was seen in August and had and exam, PAP and HPV.   8/10/2023: The PAP was unsatisfactory due to the bleeding. HPV was negative.  2023: D&C specimen was benign.  2023: PAP negative.    Pelvic US on 2023: Enlarged fibroid uterus.  The uterus measures 12 x 7 x 6 cm.  This possible fibroid arise from the uterus measuring up to 4 cm.  The endometrial stripe measures 0.2 cm in thickness.  Small volume fluid within the endometrial canal.  The right ovary measures 5 x 4 x 3 cm and the left ovary measures 3 x 3.4 x 1.8 cm. No worrisome adnexal mass is identified. No significant free fluid is demonstrated.  Normal doppler flow to the ovaries.    She also has stress urinary incontinence and underwent Urodynamics testing with Dr. Matson. He plans to perform a mid urethral sling after the hysterectomy.          Review of Systems: The following ROS was otherwise negative, except as noted in the HPI:  constitutional, HEENT, respiratory, cardiovascular, gastrointestinal, genitourinary, skin, musculoskeletal, neurological, psych    Gynecologic History:   Denies history of abnormal pap smears  Denies history of of STIs  Menstrual history:       Obstetrical History:  OB History          2    Para   2    Term   2            AB        Living   2         SAB        IAB        Ectopic        Multiple        Live Births   2           Obstetric Comments   1 FTVD  1 FTCS (HELLP)               Past Medical History:   Past Medical History:   Diagnosis Date    Abnormal uterine bleeding 2023    Mussed   "period. Been bleeding since     Fibroid     Told at ER i have a medium size one    Hypertension 2023    Just started meds    Obesity (BMI 35.0-39.9 without comorbidity)        Medications:  Medication List with Changes/Refills   Current Medications    AMLODIPINE (NORVASC) 5 MG TABLET    Take 1 tablet (5 mg total) by mouth once daily.    HYDROCODONE-ACETAMINOPHEN (NORCO) 5-325 MG PER TABLET    Take 1 tablet by mouth every 4 (four) hours as needed for Pain.    NORETHINDRONE (AYGESTIN) 5 MG TAB    Take 1 tablet (5 mg total) by mouth 3 (three) times daily.       Allergies:  Opioids - morphine analogues    Surgical History:  Past Surgical History:   Procedure Laterality Date     SECTION      CHOLECYSTECTOMY      COLPOSCOPY  Not sure    COSMETIC SURGERY   and     abdominoplasty    HYSTEROSCOPIC POLYPECTOMY OF UTERUS  2023    Procedure: POLYPECTOMY, UTERUS, HYSTEROSCOPIC;  Surgeon: Sophy Milligan MD;  Location: [facility] OR;  Service: OB/GYN;;  PAP SMEAR    HYSTEROSCOPY WITH DILATION AND CURETTAGE OF UTERUS N/A 2023    Procedure: HYSTEROSCOPY, WITH DILATION AND CURETTAGE;  Surgeon: Sophy Milligan MD;  Location: [facility] OR;  Service: OB/GYN;  Laterality: N/A;  with myosure    LIPOSUCTION OF ABDOMEN      TONSILLECTOMY         Family History:  Family History   Problem Relation Age of Onset    Asthma Mother     Cancer Mother         Melanoma    Hypertension Mother     Diabetes Father     Cancer Maternal Grandfather         Melanoma    Hypertension Maternal Grandmother        Social History:  Social History     Substance and Sexual Activity   Alcohol Use Never     Social History     Substance and Sexual Activity   Drug Use Never     Social History     Tobacco Use   Smoking Status Never    Passive exposure: Never   Smokeless Tobacco Never       Physical Exam:  /68   Pulse 85   Resp 18   Ht 5' 7" (1.702 m)   Wt 121.1 kg (267 lb)   LMP  (LMP Unknown)   SpO2 98%  "  BMI 41.82 kg/m²     General: Alert, well appearing, no acute distress. Obese.  Head: Normocephalic, atraumatic  Lungs: Unlabored respirations. Clear to auscultation bilaterally.  Cardiovascular: Regular rate and rhythm.  Abdomen: Soft, nontender, nondistended   Pelvic: Deferred.  Extremities: No redness or tenderness  Skin: Well perfused, normal coloration and turgor, no lesions or rashes visualized  Neuro: Alert, oriented, normal speech, no focal deficits, moves extremities appropriately  Psych: Normal mood and behavior.    Labs:  No visits with results within 1 Day(s) from this visit.   Latest known visit with results is:   Procedure visit on 09/25/2023   Component Date Value    Case Report 09/25/2023                      Value:Pap Cytology                                      Case: [id number]                                   Authorizing Provider:  Matheus Matson MD     Collected:           09/25/2023 01:55 PM          Ordering Location:     [facility] Received:            09/26/2023 08:30 AM                                 - Obstetrics And                                                                                    Gynecology                                                                   First Screen:          Day Leon CT(ASCP)                                                   Rescreen:              Kaylene Mayorga CT(ASCP)                                                       Specimen:    Liquid-Based Pap Test, Screening, Cervix                                                   Interpretation 09/25/2023 Negative     General Categorization 09/25/2023 Negative for intraepithelial lesion or malignancy     Specimen Adequacy 09/25/2023 Satisfactory for evaluation     Clinical Information 09/25/2023                      Value:This result contains rich text formatting which cannot be displayed here.    Disclaimer 09/25/2023                      Value:This result contains rich text  formatting which cannot be displayed here.    Culture, Urine 2023 No Growth         Answers submitted by the patient for this visit:  Gynecologic Exam Questionnaire  (Submitted on 10/21/2023)  Chief Complaint: Gynecologic exam  genital itching: No  genital lesions: No  genital odor: No  genital rash: No  missed menses: No  pelvic pain: No  vaginal bleeding: No  vaginal discharge: No  Pregnant now?: No  abdominal pain: No  anorexia: No  back pain: No  chills: No  constipation: No  diarrhea: No  discolored urine: No  dysuria: No  fever: No  flank pain: No  frequency: No  headaches: Yes  hematuria: No  nausea: No  painful intercourse: No  rash: No  urgency: No  vomiting: No  STD: No  abdominal surgery: Yes   section: Yes  Ectopic pregnancy: No  Endometriosis: No  herpes simplex: No  gynecological surgery: No  menorrhagia: Yes  metrorrhagia: Yes  miscarriage: No  ovarian cysts: Yes  perineal abscess: No  PID: No  vaginosis: No

## 2023-10-23 NOTE — H&P (VIEW-ONLY)
Gynecology History and Physical    Assessment/Plan:   Problem List Items Addressed This Visit          Renal/    Abnormal uterine bleeding - Primary    Overview     Mussed june period. Been bleeding since july 2         Dysmenorrhea     Other Visit Diagnoses       Mixed stress and urge urinary incontinence        AUDREY (stress urinary incontinence, female)              She desires definitive surgical management via a hysterectomy.   Discussed that we would plan a robotic assisted hysterectomy with bilateral salpingectomy and cystoscopy. Dr. Matson would then perform a mid urethral sling.   Discussed the benefits of cessation of menses and pain associated with menses. Discussed if there is pain from any other source, then this would not be improved with the hysterectomy. She verbalized her understanding of this. Discussed risks of bleeding, infection, damage to adjacent organs, risks of anesthesia. Discussed alternatives of medical management or to do nothing which she declines.     Discussed the benefits of the midurethral sling would be to decrease stress urinary incontinence. Per Dr. Matson's note:   Discussed success rates of procedure 85% chance of stopping incontinence 11% chance of decreasing incontinence 3-4% chance of failure.  Potential risk of vaginal or urethral erosion discussed potential risk of chronic urinary tract infections chronic pain syndrome discussed.    Potential risk of bladder injury at the time of mid urethral sling discussed.      She verbalized her understanding of the above and desires to proceed. All questions answered. Consents reviewed and signed.     Discussed that her case is at 0730 on 10/25/2023. Arrival time of 0530 discussed. NPO after 2330 the night prior.     Case again reviewed with Dr. Matson today who will be called at the cuff closure for his portion.     CC:   Chief Complaint   Patient presents with    Pre-op Exam     HPI: Kaylene Pryor is a 42 y.o. female who presents with  complaints of abnormal uterine bleeding and dysmenorrhea.  Since the hysteroscopy D&C on 23, her symptoms have improved. She has the Aygestin to take if the bleeding starts. This has been controlling it. Desires definitive surgical management.    She is still having painful cramps even though her bleeding is controlled.     She has been having moderate to severe vaginal bleeding since 2023. She was seen in August and had and exam, PAP and HPV.   8/10/2023: The PAP was unsatisfactory due to the bleeding. HPV was negative.  2023: D&C specimen was benign.  2023: PAP negative.    Pelvic US on 2023: Enlarged fibroid uterus.  The uterus measures 12 x 7 x 6 cm.  This possible fibroid arise from the uterus measuring up to 4 cm.  The endometrial stripe measures 0.2 cm in thickness.  Small volume fluid within the endometrial canal.  The right ovary measures 5 x 4 x 3 cm and the left ovary measures 3 x 3.4 x 1.8 cm. No worrisome adnexal mass is identified. No significant free fluid is demonstrated.  Normal doppler flow to the ovaries.    She also has stress urinary incontinence and underwent Urodynamics testing with Dr. Matson. He plans to perform a mid urethral sling after the hysterectomy.          Review of Systems: The following ROS was otherwise negative, except as noted in the HPI:  constitutional, HEENT, respiratory, cardiovascular, gastrointestinal, genitourinary, skin, musculoskeletal, neurological, psych    Gynecologic History:   Denies history of abnormal pap smears  Denies history of of STIs  Menstrual history:       Obstetrical History:  OB History          2    Para   2    Term   2            AB        Living   2         SAB        IAB        Ectopic        Multiple        Live Births   2           Obstetric Comments   1 FTVD  1 FTCS (HELLP)               Past Medical History:   Past Medical History:   Diagnosis Date    Abnormal uterine bleeding 2023    Mussed   "period. Been bleeding since     Fibroid     Told at ER i have a medium size one    Hypertension 2023    Just started meds    Obesity (BMI 35.0-39.9 without comorbidity)        Medications:  Medication List with Changes/Refills   Current Medications    AMLODIPINE (NORVASC) 5 MG TABLET    Take 1 tablet (5 mg total) by mouth once daily.    HYDROCODONE-ACETAMINOPHEN (NORCO) 5-325 MG PER TABLET    Take 1 tablet by mouth every 4 (four) hours as needed for Pain.    NORETHINDRONE (AYGESTIN) 5 MG TAB    Take 1 tablet (5 mg total) by mouth 3 (three) times daily.       Allergies:  Opioids - morphine analogues    Surgical History:  Past Surgical History:   Procedure Laterality Date     SECTION      CHOLECYSTECTOMY      COLPOSCOPY  Not sure    COSMETIC SURGERY   and     abdominoplasty    HYSTEROSCOPIC POLYPECTOMY OF UTERUS  2023    Procedure: POLYPECTOMY, UTERUS, HYSTEROSCOPIC;  Surgeon: Sophy Milligan MD;  Location: [facility] OR;  Service: OB/GYN;;  PAP SMEAR    HYSTEROSCOPY WITH DILATION AND CURETTAGE OF UTERUS N/A 2023    Procedure: HYSTEROSCOPY, WITH DILATION AND CURETTAGE;  Surgeon: Sophy Milligan MD;  Location: [facility] OR;  Service: OB/GYN;  Laterality: N/A;  with myosure    LIPOSUCTION OF ABDOMEN      TONSILLECTOMY         Family History:  Family History   Problem Relation Age of Onset    Asthma Mother     Cancer Mother         Melanoma    Hypertension Mother     Diabetes Father     Cancer Maternal Grandfather         Melanoma    Hypertension Maternal Grandmother        Social History:  Social History     Substance and Sexual Activity   Alcohol Use Never     Social History     Substance and Sexual Activity   Drug Use Never     Social History     Tobacco Use   Smoking Status Never    Passive exposure: Never   Smokeless Tobacco Never       Physical Exam:  /68   Pulse 85   Resp 18   Ht 5' 7" (1.702 m)   Wt 121.1 kg (267 lb)   LMP  (LMP Unknown)   SpO2 98%  "  BMI 41.82 kg/m²     General: Alert, well appearing, no acute distress. Obese.  Head: Normocephalic, atraumatic  Lungs: Unlabored respirations. Clear to auscultation bilaterally.  Cardiovascular: Regular rate and rhythm.  Abdomen: Soft, nontender, nondistended   Pelvic: Deferred.  Extremities: No redness or tenderness  Skin: Well perfused, normal coloration and turgor, no lesions or rashes visualized  Neuro: Alert, oriented, normal speech, no focal deficits, moves extremities appropriately  Psych: Normal mood and behavior.    Labs:  No visits with results within 1 Day(s) from this visit.   Latest known visit with results is:   Procedure visit on 09/25/2023   Component Date Value    Case Report 09/25/2023                      Value:Pap Cytology                                      Case: [id number]                                   Authorizing Provider:  Matheus Matson MD     Collected:           09/25/2023 01:55 PM          Ordering Location:     [facility] Received:            09/26/2023 08:30 AM                                 - Obstetrics And                                                                                    Gynecology                                                                   First Screen:          Day [facility] CT(ASCP)                                                   Rescreen:              Kaylene Mayorga CT(ASCP)                                                       Specimen:    Liquid-Based Pap Test, Screening, Cervix                                                   Interpretation 09/25/2023 Negative     General Categorization 09/25/2023 Negative for intraepithelial lesion or malignancy     Specimen Adequacy 09/25/2023 Satisfactory for evaluation     Clinical Information 09/25/2023                      Value:This result contains rich text formatting which cannot be displayed here.    Disclaimer 09/25/2023                      Value:This result contains rich text  formatting which cannot be displayed here.    Culture, Urine 2023 No Growth         Answers submitted by the patient for this visit:  Gynecologic Exam Questionnaire  (Submitted on 10/21/2023)  Chief Complaint: Gynecologic exam  genital itching: No  genital lesions: No  genital odor: No  genital rash: No  missed menses: No  pelvic pain: No  vaginal bleeding: No  vaginal discharge: No  Pregnant now?: No  abdominal pain: No  anorexia: No  back pain: No  chills: No  constipation: No  diarrhea: No  discolored urine: No  dysuria: No  fever: No  flank pain: No  frequency: No  headaches: Yes  hematuria: No  nausea: No  painful intercourse: No  rash: No  urgency: No  vomiting: No  STD: No  abdominal surgery: Yes   section: Yes  Ectopic pregnancy: No  Endometriosis: No  herpes simplex: No  gynecological surgery: No  menorrhagia: Yes  metrorrhagia: Yes  miscarriage: No  ovarian cysts: Yes  perineal abscess: No  PID: No  vaginosis: No

## 2023-10-25 ENCOUNTER — ANESTHESIA (OUTPATIENT)
Dept: SURGERY | Facility: HOSPITAL | Age: 43
End: 2023-10-25
Payer: OTHER GOVERNMENT

## 2023-10-25 ENCOUNTER — HOSPITAL ENCOUNTER (OUTPATIENT)
Facility: HOSPITAL | Age: 43
Discharge: HOME | End: 2023-10-26
Attending: OBSTETRICS & GYNECOLOGY | Admitting: OBSTETRICS & GYNECOLOGY
Payer: OTHER GOVERNMENT

## 2023-10-25 ENCOUNTER — ANESTHESIA EVENT (OUTPATIENT)
Dept: SURGERY | Facility: HOSPITAL | Age: 43
End: 2023-10-25
Payer: OTHER GOVERNMENT

## 2023-10-25 DIAGNOSIS — Z01.818 PREOPERATIVE CLEARANCE: ICD-10-CM

## 2023-10-25 DIAGNOSIS — N93.9 ABNORMAL UTERINE BLEEDING: ICD-10-CM

## 2023-10-25 DIAGNOSIS — N92.1 MENORRHAGIA WITH IRREGULAR CYCLE: ICD-10-CM

## 2023-10-25 DIAGNOSIS — N99.518 COMPLICATION OF CYSTOSTOMY: Primary | ICD-10-CM

## 2023-10-25 DIAGNOSIS — N94.6 DYSMENORRHEA: ICD-10-CM

## 2023-10-25 PROBLEM — N39.3 SUI (STRESS URINARY INCONTINENCE, FEMALE): Status: ACTIVE | Noted: 2023-10-25

## 2023-10-25 LAB
ANION GAP SERPL CALCULATED.3IONS-SCNC: 9 MMOL/L (ref 7–16)
B-HCG UR QL: NEGATIVE
BACTERIA #/AREA URNS HPF: ABNORMAL /HPF
BASOPHILS # BLD AUTO: 0.09 K/UL (ref 0–0.2)
BASOPHILS # BLD AUTO: 0.1 K/UL (ref 0–0.2)
BASOPHILS NFR BLD AUTO: 0.7 % (ref 0–1)
BASOPHILS NFR BLD AUTO: 0.8 % (ref 0–1)
BILIRUB UR QL STRIP: NEGATIVE
BUN SERPL-MCNC: 12 MG/DL (ref 7–18)
BUN/CREAT SERPL: 13 (ref 6–20)
CALCIUM SERPL-MCNC: 9 MG/DL (ref 8.5–10.1)
CHLORIDE SERPL-SCNC: 107 MMOL/L (ref 98–107)
CLARITY UR: CLEAR
CO2 SERPL-SCNC: 26 MMOL/L (ref 21–32)
COLOR UR: YELLOW
CREAT SERPL-MCNC: 0.89 MG/DL (ref 0.55–1.02)
CTP QC/QA: YES
DIFFERENTIAL METHOD BLD: ABNORMAL
DIFFERENTIAL METHOD BLD: ABNORMAL
EGFR (NO RACE VARIABLE) (RUSH/TITUS): 82 ML/MIN/1.73M2
EOSINOPHIL # BLD AUTO: 0.11 K/UL (ref 0–0.5)
EOSINOPHIL # BLD AUTO: 0.59 K/UL (ref 0–0.5)
EOSINOPHIL NFR BLD AUTO: 0.7 % (ref 1–4)
EOSINOPHIL NFR BLD AUTO: 5.5 % (ref 1–4)
ERYTHROCYTE [DISTWIDTH] IN BLOOD BY AUTOMATED COUNT: 13.1 % (ref 11.5–14.5)
ERYTHROCYTE [DISTWIDTH] IN BLOOD BY AUTOMATED COUNT: 13.2 % (ref 11.5–14.5)
GLUCOSE SERPL-MCNC: 163 MG/DL (ref 74–106)
GLUCOSE UR STRIP-MCNC: 30 MG/DL
HCT VFR BLD AUTO: 38.9 % (ref 38–47)
HCT VFR BLD AUTO: 41.4 % (ref 38–47)
HGB BLD-MCNC: 12.2 G/DL (ref 12–16)
HGB BLD-MCNC: 12.6 G/DL (ref 12–16)
IMM GRANULOCYTES # BLD AUTO: 0.02 K/UL (ref 0–0.04)
IMM GRANULOCYTES # BLD AUTO: 0.06 K/UL (ref 0–0.04)
IMM GRANULOCYTES NFR BLD: 0.2 % (ref 0–0.4)
IMM GRANULOCYTES NFR BLD: 0.4 % (ref 0–0.4)
INDIRECT COOMBS: NORMAL
KETONES UR STRIP-SCNC: NEGATIVE MG/DL
LEUKOCYTE ESTERASE UR QL STRIP: ABNORMAL
LYMPHOCYTES # BLD AUTO: 1.87 K/UL (ref 1–4.8)
LYMPHOCYTES # BLD AUTO: 3.31 K/UL (ref 1–4.8)
LYMPHOCYTES NFR BLD AUTO: 12.7 % (ref 27–41)
LYMPHOCYTES NFR BLD AUTO: 31 % (ref 27–41)
MCH RBC QN AUTO: 26.1 PG (ref 27–31)
MCH RBC QN AUTO: 26.3 PG (ref 27–31)
MCHC RBC AUTO-ENTMCNC: 30.4 G/DL (ref 32–36)
MCHC RBC AUTO-ENTMCNC: 31.4 G/DL (ref 32–36)
MCV RBC AUTO: 83.3 FL (ref 80–96)
MCV RBC AUTO: 86.4 FL (ref 80–96)
MONOCYTES # BLD AUTO: 0.39 K/UL (ref 0–0.8)
MONOCYTES # BLD AUTO: 0.77 K/UL (ref 0–0.8)
MONOCYTES NFR BLD AUTO: 2.7 % (ref 2–6)
MONOCYTES NFR BLD AUTO: 7.2 % (ref 2–6)
MPC BLD CALC-MCNC: 10.3 FL (ref 9.4–12.4)
MPC BLD CALC-MCNC: 10.4 FL (ref 9.4–12.4)
MUCOUS, UA: ABNORMAL /LPF
NEUTROPHILS # BLD AUTO: 12.14 K/UL (ref 1.8–7.7)
NEUTROPHILS # BLD AUTO: 5.89 K/UL (ref 1.8–7.7)
NEUTROPHILS NFR BLD AUTO: 55.3 % (ref 53–65)
NEUTROPHILS NFR BLD AUTO: 82.8 % (ref 53–65)
NITRITE UR QL STRIP: NEGATIVE
NRBC # BLD AUTO: 0 X10E3/UL
NRBC # BLD AUTO: 0 X10E3/UL
NRBC, AUTO (.00): 0 %
NRBC, AUTO (.00): 0 %
PH UR STRIP: 6.5 PH UNITS
PLATELET # BLD AUTO: 376 K/UL (ref 150–400)
PLATELET # BLD AUTO: 393 K/UL (ref 150–400)
POTASSIUM SERPL-SCNC: 3.8 MMOL/L (ref 3.5–5.1)
PROT UR QL STRIP: 30
RBC # BLD AUTO: 4.67 M/UL (ref 4.2–5.4)
RBC # BLD AUTO: 4.79 M/UL (ref 4.2–5.4)
RBC # UR STRIP: ABNORMAL /UL
RBC #/AREA URNS HPF: 7 /HPF
RH BLD: NORMAL
SODIUM SERPL-SCNC: 138 MMOL/L (ref 136–145)
SP GR UR STRIP: 1.03
SPECIMEN OUTDATE: NORMAL
SQUAMOUS #/AREA URNS LPF: ABNORMAL /HPF
UROBILINOGEN UR STRIP-ACNC: 2 MG/DL
WBC # BLD AUTO: 10.67 K/UL (ref 4.5–11)
WBC # BLD AUTO: 14.67 K/UL (ref 4.5–11)
WBC #/AREA URNS HPF: 8 /HPF

## 2023-10-25 PROCEDURE — 94761 N-INVAS EAR/PLS OXIMETRY MLT: CPT | Mod: XB

## 2023-10-25 PROCEDURE — 58571 TLH W/T/O 250 G OR LESS: CPT | Mod: 80,,, | Performed by: OBSTETRICS & GYNECOLOGY

## 2023-10-25 PROCEDURE — 36000713 HC OR TIME LEV V EA ADD 15 MIN: Performed by: OBSTETRICS & GYNECOLOGY

## 2023-10-25 PROCEDURE — C2628 CATHETER, OCCLUSION: HCPCS | Performed by: OBSTETRICS & GYNECOLOGY

## 2023-10-25 PROCEDURE — 88342 SURGICAL PATHOLOGY: ICD-10-PCS | Mod: 26,,, | Performed by: PATHOLOGY

## 2023-10-25 PROCEDURE — 63600175 PHARM REV CODE 636 W HCPCS: Performed by: ANESTHESIOLOGY

## 2023-10-25 PROCEDURE — 81001 URINALYSIS AUTO W/SCOPE: CPT | Performed by: OBSTETRICS & GYNECOLOGY

## 2023-10-25 PROCEDURE — D9220A PRA ANESTHESIA: Mod: ANES,,, | Performed by: ANESTHESIOLOGY

## 2023-10-25 PROCEDURE — 99900035 HC TECH TIME PER 15 MIN (STAT)

## 2023-10-25 PROCEDURE — 27000716 HC OXISENSOR PROBE, ANY SIZE: Performed by: ANESTHESIOLOGY

## 2023-10-25 PROCEDURE — 27202344 HC EYESHIELD: Performed by: ANESTHESIOLOGY

## 2023-10-25 PROCEDURE — D9220A PRA ANESTHESIA: ICD-10-PCS | Mod: ANES,,, | Performed by: ANESTHESIOLOGY

## 2023-10-25 PROCEDURE — 88307 SURGICAL PATHOLOGY: ICD-10-PCS | Mod: 26,,, | Performed by: PATHOLOGY

## 2023-10-25 PROCEDURE — 71000033 HC RECOVERY, INTIAL HOUR: Performed by: OBSTETRICS & GYNECOLOGY

## 2023-10-25 PROCEDURE — 58571 TLH W/T/O 250 G OR LESS: CPT | Mod: ,,, | Performed by: OBSTETRICS & GYNECOLOGY

## 2023-10-25 PROCEDURE — 81025 URINE PREGNANCY TEST: CPT | Performed by: OBSTETRICS & GYNECOLOGY

## 2023-10-25 PROCEDURE — D9220A PRA ANESTHESIA: Mod: CRNA,,, | Performed by: NURSE ANESTHETIST, CERTIFIED REGISTERED

## 2023-10-25 PROCEDURE — 25000003 PHARM REV CODE 250: Performed by: OBSTETRICS & GYNECOLOGY

## 2023-10-25 PROCEDURE — 63600175 PHARM REV CODE 636 W HCPCS: Performed by: OBSTETRICS & GYNECOLOGY

## 2023-10-25 PROCEDURE — 71000016 HC POSTOP RECOV ADDL HR: Performed by: OBSTETRICS & GYNECOLOGY

## 2023-10-25 PROCEDURE — 25000003 PHARM REV CODE 250: Performed by: NURSE ANESTHETIST, CERTIFIED REGISTERED

## 2023-10-25 PROCEDURE — 37000009 HC ANESTHESIA EA ADD 15 MINS: Performed by: OBSTETRICS & GYNECOLOGY

## 2023-10-25 PROCEDURE — 88342 IMHCHEM/IMCYTCHM 1ST ANTB: CPT | Mod: 26,,, | Performed by: PATHOLOGY

## 2023-10-25 PROCEDURE — 37000008 HC ANESTHESIA 1ST 15 MINUTES: Performed by: OBSTETRICS & GYNECOLOGY

## 2023-10-25 PROCEDURE — 27000510 HC BLANKET BAIR HUGGER ANY SIZE: Performed by: ANESTHESIOLOGY

## 2023-10-25 PROCEDURE — 71000015 HC POSTOP RECOV 1ST HR: Performed by: OBSTETRICS & GYNECOLOGY

## 2023-10-25 PROCEDURE — 27000165 HC TUBE, ETT CUFFED: Performed by: ANESTHESIOLOGY

## 2023-10-25 PROCEDURE — 00840 ANES IPER PX LOWER ABD NOS: CPT | Performed by: OBSTETRICS & GYNECOLOGY

## 2023-10-25 PROCEDURE — 88342 IMHCHEM/IMCYTCHM 1ST ANTB: CPT | Mod: TC,SUR | Performed by: OBSTETRICS & GYNECOLOGY

## 2023-10-25 PROCEDURE — 80048 BASIC METABOLIC PNL TOTAL CA: CPT | Performed by: OBSTETRICS & GYNECOLOGY

## 2023-10-25 PROCEDURE — 51860 PR REPAIR BLADDER WOUND/INJ,SIMPLE: ICD-10-PCS | Mod: 51,,, | Performed by: OBSTETRICS & GYNECOLOGY

## 2023-10-25 PROCEDURE — 85025 COMPLETE CBC W/AUTO DIFF WBC: CPT | Performed by: OBSTETRICS & GYNECOLOGY

## 2023-10-25 PROCEDURE — 58571 PR LAPAROSCOPY W TOT HYSTERECTUTERUS <=250 GRAM  W TUBE/OVARY: ICD-10-PCS | Mod: 80,,, | Performed by: OBSTETRICS & GYNECOLOGY

## 2023-10-25 PROCEDURE — D9220A PRA ANESTHESIA: ICD-10-PCS | Mod: CRNA,,, | Performed by: NURSE ANESTHETIST, CERTIFIED REGISTERED

## 2023-10-25 PROCEDURE — 99900031 HC PATIENT EDUCATION (STAT)

## 2023-10-25 PROCEDURE — 27000284 HC CANNULA NASAL: Performed by: ANESTHESIOLOGY

## 2023-10-25 PROCEDURE — 36000712 HC OR TIME LEV V 1ST 15 MIN: Performed by: OBSTETRICS & GYNECOLOGY

## 2023-10-25 PROCEDURE — 58571 PR LAPAROSCOPY W TOT HYSTERECTUTERUS <=250 GRAM  W TUBE/OVARY: ICD-10-PCS | Mod: ,,, | Performed by: OBSTETRICS & GYNECOLOGY

## 2023-10-25 PROCEDURE — 88307 TISSUE EXAM BY PATHOLOGIST: CPT | Mod: TC,SUR | Performed by: OBSTETRICS & GYNECOLOGY

## 2023-10-25 PROCEDURE — 25000003 PHARM REV CODE 250: Performed by: ANESTHESIOLOGY

## 2023-10-25 PROCEDURE — 51860 REPAIR OF BLADDER WOUND: CPT | Mod: 80,,, | Performed by: OBSTETRICS & GYNECOLOGY

## 2023-10-25 PROCEDURE — 51860 PR REPAIR BLADDER WOUND/INJ,SIMPLE: ICD-10-PCS | Mod: 80,,, | Performed by: OBSTETRICS & GYNECOLOGY

## 2023-10-25 PROCEDURE — 88307 TISSUE EXAM BY PATHOLOGIST: CPT | Mod: 26,,, | Performed by: PATHOLOGY

## 2023-10-25 PROCEDURE — 63600175 PHARM REV CODE 636 W HCPCS: Performed by: NURSE ANESTHETIST, CERTIFIED REGISTERED

## 2023-10-25 PROCEDURE — 27000655: Performed by: ANESTHESIOLOGY

## 2023-10-25 PROCEDURE — 27201423 OPTIME MED/SURG SUP & DEVICES STERILE SUPPLY: Performed by: OBSTETRICS & GYNECOLOGY

## 2023-10-25 PROCEDURE — 51860 REPAIR OF BLADDER WOUND: CPT | Mod: 51,,, | Performed by: OBSTETRICS & GYNECOLOGY

## 2023-10-25 PROCEDURE — 27000689 HC BLADE LARYNGOSCOPE ANY SIZE: Performed by: ANESTHESIOLOGY

## 2023-10-25 PROCEDURE — 86901 BLOOD TYPING SEROLOGIC RH(D): CPT | Performed by: OBSTETRICS & GYNECOLOGY

## 2023-10-25 RX ORDER — PROPOFOL 10 MG/ML
VIAL (ML) INTRAVENOUS
Status: DISCONTINUED | OUTPATIENT
Start: 2023-10-25 | End: 2023-10-25

## 2023-10-25 RX ORDER — GLYCOPYRROLATE 0.2 MG/ML
INJECTION INTRAMUSCULAR; INTRAVENOUS
Status: DISCONTINUED | OUTPATIENT
Start: 2023-10-25 | End: 2023-10-25

## 2023-10-25 RX ORDER — PHENAZOPYRIDINE HYDROCHLORIDE 100 MG/1
200 TABLET, FILM COATED ORAL
Status: COMPLETED | OUTPATIENT
Start: 2023-10-25 | End: 2023-10-25

## 2023-10-25 RX ORDER — FAMOTIDINE 20 MG/1
20 TABLET, FILM COATED ORAL
Status: COMPLETED | OUTPATIENT
Start: 2023-10-25 | End: 2023-10-25

## 2023-10-25 RX ORDER — HYDROMORPHONE HYDROCHLORIDE 2 MG/ML
0.5 INJECTION, SOLUTION INTRAMUSCULAR; INTRAVENOUS; SUBCUTANEOUS EVERY 5 MIN PRN
Status: DISCONTINUED | OUTPATIENT
Start: 2023-10-25 | End: 2023-10-25

## 2023-10-25 RX ORDER — HYDROCODONE BITARTRATE AND ACETAMINOPHEN 5; 325 MG/1; MG/1
1 TABLET ORAL EVERY 4 HOURS PRN
Status: DISCONTINUED | OUTPATIENT
Start: 2023-10-25 | End: 2023-10-26 | Stop reason: HOSPADM

## 2023-10-25 RX ORDER — MEPERIDINE HYDROCHLORIDE 25 MG/ML
25 INJECTION INTRAMUSCULAR; INTRAVENOUS; SUBCUTANEOUS EVERY 10 MIN PRN
Status: DISCONTINUED | OUTPATIENT
Start: 2023-10-25 | End: 2023-10-25

## 2023-10-25 RX ORDER — PROCHLORPERAZINE EDISYLATE 5 MG/ML
5 INJECTION INTRAMUSCULAR; INTRAVENOUS EVERY 6 HOURS PRN
Status: DISCONTINUED | OUTPATIENT
Start: 2023-10-25 | End: 2023-10-26 | Stop reason: HOSPADM

## 2023-10-25 RX ORDER — PHENYLEPHRINE HYDROCHLORIDE 10 MG/ML
INJECTION INTRAVENOUS
Status: DISCONTINUED | OUTPATIENT
Start: 2023-10-25 | End: 2023-10-25

## 2023-10-25 RX ORDER — DIPHENHYDRAMINE HYDROCHLORIDE 50 MG/ML
25 INJECTION INTRAMUSCULAR; INTRAVENOUS EVERY 6 HOURS PRN
Status: DISCONTINUED | OUTPATIENT
Start: 2023-10-25 | End: 2023-10-25

## 2023-10-25 RX ORDER — ONDANSETRON 2 MG/ML
INJECTION INTRAMUSCULAR; INTRAVENOUS
Status: DISCONTINUED | OUTPATIENT
Start: 2023-10-25 | End: 2023-10-25

## 2023-10-25 RX ORDER — ONDANSETRON 4 MG/1
8 TABLET, ORALLY DISINTEGRATING ORAL EVERY 8 HOURS PRN
Status: DISCONTINUED | OUTPATIENT
Start: 2023-10-25 | End: 2023-10-26 | Stop reason: HOSPADM

## 2023-10-25 RX ORDER — FLUORESCEIN 500 MG/ML
INJECTION INTRAVENOUS
Status: DISCONTINUED | OUTPATIENT
Start: 2023-10-25 | End: 2023-10-25

## 2023-10-25 RX ORDER — ONDANSETRON 2 MG/ML
4 INJECTION INTRAMUSCULAR; INTRAVENOUS DAILY PRN
Status: DISCONTINUED | OUTPATIENT
Start: 2023-10-25 | End: 2023-10-25

## 2023-10-25 RX ORDER — HYDROCODONE BITARTRATE AND ACETAMINOPHEN 10; 325 MG/1; MG/1
1 TABLET ORAL EVERY 4 HOURS PRN
Status: DISCONTINUED | OUTPATIENT
Start: 2023-10-25 | End: 2023-10-26 | Stop reason: HOSPADM

## 2023-10-25 RX ORDER — SODIUM CHLORIDE, SODIUM LACTATE, POTASSIUM CHLORIDE, CALCIUM CHLORIDE 600; 310; 30; 20 MG/100ML; MG/100ML; MG/100ML; MG/100ML
INJECTION, SOLUTION INTRAVENOUS CONTINUOUS
Status: DISCONTINUED | OUTPATIENT
Start: 2023-10-25 | End: 2023-10-26 | Stop reason: HOSPADM

## 2023-10-25 RX ORDER — SODIUM CHLORIDE 9 MG/ML
INJECTION, SOLUTION INTRAVENOUS CONTINUOUS
Status: DISCONTINUED | OUTPATIENT
Start: 2023-10-25 | End: 2023-10-25

## 2023-10-25 RX ORDER — SODIUM CHLORIDE, SODIUM LACTATE, POTASSIUM CHLORIDE, CALCIUM CHLORIDE 600; 310; 30; 20 MG/100ML; MG/100ML; MG/100ML; MG/100ML
125 INJECTION, SOLUTION INTRAVENOUS CONTINUOUS
Status: DISCONTINUED | OUTPATIENT
Start: 2023-10-25 | End: 2023-10-25

## 2023-10-25 RX ORDER — AMLODIPINE BESYLATE 5 MG/1
5 TABLET ORAL DAILY
Status: DISCONTINUED | OUTPATIENT
Start: 2023-10-25 | End: 2023-10-25

## 2023-10-25 RX ORDER — MUPIROCIN 20 MG/G
1 OINTMENT TOPICAL 2 TIMES DAILY
Status: DISCONTINUED | OUTPATIENT
Start: 2023-10-25 | End: 2023-10-26 | Stop reason: HOSPADM

## 2023-10-25 RX ORDER — AMLODIPINE BESYLATE 5 MG/1
5 TABLET ORAL DAILY
Status: DISCONTINUED | OUTPATIENT
Start: 2023-10-25 | End: 2023-10-26 | Stop reason: HOSPADM

## 2023-10-25 RX ORDER — BUPIVACAINE HYDROCHLORIDE 2.5 MG/ML
INJECTION, SOLUTION EPIDURAL; INFILTRATION; INTRACAUDAL
Status: DISCONTINUED | OUTPATIENT
Start: 2023-10-25 | End: 2023-10-25 | Stop reason: HOSPADM

## 2023-10-25 RX ORDER — LIDOCAINE HYDROCHLORIDE 20 MG/ML
INJECTION, SOLUTION EPIDURAL; INFILTRATION; INTRACAUDAL; PERINEURAL
Status: DISCONTINUED | OUTPATIENT
Start: 2023-10-25 | End: 2023-10-25

## 2023-10-25 RX ORDER — SIMETHICONE 80 MG
80 TABLET,CHEWABLE ORAL EVERY 4 HOURS PRN
Status: DISCONTINUED | OUTPATIENT
Start: 2023-10-25 | End: 2023-10-26 | Stop reason: HOSPADM

## 2023-10-25 RX ORDER — MUPIROCIN 20 MG/G
OINTMENT TOPICAL
Status: DISCONTINUED | OUTPATIENT
Start: 2023-10-25 | End: 2023-10-25

## 2023-10-25 RX ORDER — MIDAZOLAM HYDROCHLORIDE 1 MG/ML
INJECTION INTRAMUSCULAR; INTRAVENOUS
Status: DISCONTINUED | OUTPATIENT
Start: 2023-10-25 | End: 2023-10-25

## 2023-10-25 RX ORDER — DEXAMETHASONE SODIUM PHOSPHATE 4 MG/ML
INJECTION, SOLUTION INTRA-ARTICULAR; INTRALESIONAL; INTRAMUSCULAR; INTRAVENOUS; SOFT TISSUE
Status: DISCONTINUED | OUTPATIENT
Start: 2023-10-25 | End: 2023-10-25

## 2023-10-25 RX ORDER — IBUPROFEN 600 MG/1
600 TABLET ORAL EVERY 6 HOURS PRN
Status: DISCONTINUED | OUTPATIENT
Start: 2023-10-25 | End: 2023-10-26 | Stop reason: HOSPADM

## 2023-10-25 RX ORDER — DIAZEPAM 5 MG/1
10 TABLET ORAL
Status: COMPLETED | OUTPATIENT
Start: 2023-10-25 | End: 2023-10-25

## 2023-10-25 RX ORDER — FENTANYL CITRATE 50 UG/ML
INJECTION, SOLUTION INTRAMUSCULAR; INTRAVENOUS
Status: DISCONTINUED | OUTPATIENT
Start: 2023-10-25 | End: 2023-10-25

## 2023-10-25 RX ORDER — DIPHENHYDRAMINE HCL 25 MG
25 CAPSULE ORAL EVERY 4 HOURS PRN
Status: DISCONTINUED | OUTPATIENT
Start: 2023-10-25 | End: 2023-10-26 | Stop reason: HOSPADM

## 2023-10-25 RX ORDER — HYDROMORPHONE HYDROCHLORIDE 2 MG/ML
1 INJECTION, SOLUTION INTRAMUSCULAR; INTRAVENOUS; SUBCUTANEOUS EVERY 6 HOURS PRN
Status: DISCONTINUED | OUTPATIENT
Start: 2023-10-25 | End: 2023-10-26 | Stop reason: HOSPADM

## 2023-10-25 RX ORDER — ROCURONIUM BROMIDE 10 MG/ML
INJECTION, SOLUTION INTRAVENOUS
Status: DISCONTINUED | OUTPATIENT
Start: 2023-10-25 | End: 2023-10-25

## 2023-10-25 RX ORDER — DIMENHYDRINATE 50 MG
50 TABLET ORAL NIGHTLY PRN
Status: DISCONTINUED | OUTPATIENT
Start: 2023-10-25 | End: 2023-10-25

## 2023-10-25 RX ADMIN — HYDROCODONE BITARTRATE AND ACETAMINOPHEN 1 TABLET: 10; 325 TABLET ORAL at 09:10

## 2023-10-25 RX ADMIN — CEFAZOLIN 3 G: 1 INJECTION, POWDER, FOR SOLUTION INTRAMUSCULAR; INTRAVENOUS; PARENTERAL at 08:10

## 2023-10-25 RX ADMIN — SODIUM CHLORIDE: 9 INJECTION, SOLUTION INTRAVENOUS at 07:10

## 2023-10-25 RX ADMIN — PHENYLEPHRINE HYDROCHLORIDE 100 MCG: 10 INJECTION INTRAVENOUS at 08:10

## 2023-10-25 RX ADMIN — SODIUM CHLORIDE, POTASSIUM CHLORIDE, SODIUM LACTATE AND CALCIUM CHLORIDE: 600; 310; 30; 20 INJECTION, SOLUTION INTRAVENOUS at 05:10

## 2023-10-25 RX ADMIN — DIAZEPAM 10 MG: 5 TABLET ORAL at 06:10

## 2023-10-25 RX ADMIN — SODIUM CHLORIDE: 9 INJECTION, SOLUTION INTRAVENOUS at 10:10

## 2023-10-25 RX ADMIN — HYDROMORPHONE HYDROCHLORIDE 0.5 MG: 2 INJECTION INTRAMUSCULAR; INTRAVENOUS; SUBCUTANEOUS at 11:10

## 2023-10-25 RX ADMIN — PHENAZOPYRIDINE 200 MG: 100 TABLET ORAL at 06:10

## 2023-10-25 RX ADMIN — FLUORESCEIN SODIUM 100 MG: 100 INJECTION INTRAVENOUS at 10:10

## 2023-10-25 RX ADMIN — GLYCOPYRROLATE 0.2 MG: 0.2 INJECTION INTRAMUSCULAR; INTRAVENOUS at 08:10

## 2023-10-25 RX ADMIN — DEXAMETHASONE SODIUM PHOSPHATE 8 MG: 4 INJECTION, SOLUTION INTRA-ARTICULAR; INTRALESIONAL; INTRAMUSCULAR; INTRAVENOUS; SOFT TISSUE at 07:10

## 2023-10-25 RX ADMIN — FENTANYL CITRATE 50 MCG: 50 INJECTION INTRAMUSCULAR; INTRAVENOUS at 11:10

## 2023-10-25 RX ADMIN — FAMOTIDINE 20 MG: 20 TABLET ORAL at 06:10

## 2023-10-25 RX ADMIN — PROPOFOL 200 MG: 10 INJECTION, EMULSION INTRAVENOUS at 07:10

## 2023-10-25 RX ADMIN — ROCURONIUM BROMIDE 10 MG: 10 INJECTION, SOLUTION INTRAVENOUS at 09:10

## 2023-10-25 RX ADMIN — HYDROCODONE BITARTRATE AND ACETAMINOPHEN 1 TABLET: 10; 325 TABLET ORAL at 05:10

## 2023-10-25 RX ADMIN — FENTANYL CITRATE 100 MCG: 50 INJECTION INTRAMUSCULAR; INTRAVENOUS at 07:10

## 2023-10-25 RX ADMIN — ONDANSETRON 4 MG: 2 INJECTION INTRAMUSCULAR; INTRAVENOUS at 10:10

## 2023-10-25 RX ADMIN — MIDAZOLAM HYDROCHLORIDE 2 MG: 1 INJECTION, SOLUTION INTRAMUSCULAR; INTRAVENOUS at 07:10

## 2023-10-25 RX ADMIN — ROCURONIUM BROMIDE 10 MG: 10 INJECTION, SOLUTION INTRAVENOUS at 08:10

## 2023-10-25 RX ADMIN — MUPIROCIN 1 G: 20 OINTMENT TOPICAL at 09:10

## 2023-10-25 RX ADMIN — ROCURONIUM BROMIDE 10 MG: 10 INJECTION, SOLUTION INTRAVENOUS at 10:10

## 2023-10-25 RX ADMIN — DIMENHYDRINATE 50 MG: 50 TABLET ORAL at 06:10

## 2023-10-25 RX ADMIN — ROCURONIUM BROMIDE 40 MG: 10 INJECTION, SOLUTION INTRAVENOUS at 07:10

## 2023-10-25 RX ADMIN — ONDANSETRON 4 MG: 2 INJECTION INTRAMUSCULAR; INTRAVENOUS at 07:10

## 2023-10-25 RX ADMIN — AMLODIPINE BESYLATE 5 MG: 5 TABLET ORAL at 09:10

## 2023-10-25 RX ADMIN — ROCURONIUM BROMIDE 20 MG: 10 INJECTION, SOLUTION INTRAVENOUS at 08:10

## 2023-10-25 RX ADMIN — LIDOCAINE HYDROCHLORIDE 80 MG: 20 INJECTION, SOLUTION INTRAVENOUS at 07:10

## 2023-10-25 NOTE — PROGRESS NOTES
Went back to check on the patient. She is more awake and doing well. Pain controlled. CBC resulted stable. H/H was 12.6/41.4. Pre-op H/H was 12.2 and 38.9.  Discussed with the patient that there was an incidental cystotomy at the time of surgery that was repaired and she would need to wear a Morris catheter for 7-10 days. She verbalized her understanding. This was also discussed with her  and mother.   Abdomen is moderately distended, but soft. Minimal TTP. She states that her abdomen will get distended at home and then go down after a few hours.   Will continue to monitor closely and plan imaging if any acute changes.   Repeat CBC in AM.

## 2023-10-25 NOTE — ANESTHESIA PREPROCEDURE EVALUATION
10/25/2023  Kaylene Pryor is a 42 y.o., female.      Pre-op Assessment    I have reviewed the Patient Summary Reports.     I have reviewed the Nursing Notes. I have reviewed the NPO Status.   I have reviewed the Medications.     Review of Systems  Anesthesia Hx:  No problems with previous Anesthesia    Social:  Non-Smoker, No Alcohol Use    Hematology/Oncology:  Hematology Normal   Oncology Normal     EENT/Dental:EENT/Dental Normal   Cardiovascular:   Hypertension    Pulmonary:   Asthma    Renal/:  Renal/ Normal     Hepatic/GI:  Hepatic/GI Normal    Musculoskeletal:  Musculoskeletal Normal    Neurological:  Neurology Normal    Endocrine:  Endocrine Normal  Morbid Obesity / BMI > 40  Dermatological:  Skin Normal    Psych:  Psychiatric Normal           Physical Exam  General: Well nourished    Airway:  Mallampati: III / III  Mouth Opening: Normal  TM Distance: > 6 cm  Tongue: Normal  Neck ROM: Normal ROM    Chest/Lungs:  Clear to auscultation, Normal Respiratory Rate    Heart:  Rate: Normal  Rhythm: Regular Rhythm        Anesthesia Plan  Type of Anesthesia, risks & benefits discussed:    Anesthesia Type: Gen ETT, Regional  Intra-op Monitoring Plan: Standard ASA Monitors  Post Op Pain Control Plan: multimodal analgesia  Induction:  IV  Informed Consent: Informed consent signed with the Patient and all parties understand the risks and agree with anesthesia plan.  All questions answered. Patient consented to blood products? Yes  ASA Score: 2  Day of Surgery Review of History & Physical: H&P Update referred to the surgeon/provider.I have interviewed and examined the patient. I have reviewed the patient's H&P dated: There are no significant changes. H&P completed by Anesthesiologist.    Ready For Surgery From Anesthesia Perspective.     .

## 2023-10-25 NOTE — INTERVAL H&P NOTE
The patient has been examined and the H&P has been reviewed:    I concur with the findings and no changes have occurred since H&P was written.    Anesthesia/Surgery risks, benefits and alternative options discussed and understood by patient/family.          Active Hospital Problems    Diagnosis  POA    *Abnormal uterine bleeding [N93.9]  Yes     Mussed june period. Been bleeding since july 2      AUDREY (stress urinary incontinence, female) [N39.3]  Yes    Fibroid [D21.9]  Yes     Told at ER i have a medium size one      Dysmenorrhea [N94.6]  Yes      Resolved Hospital Problems   No resolved problems to display.

## 2023-10-25 NOTE — OP NOTE
Operative Note    Surgery Date: 10/25/2023      Surgeon(s) and Role:     * Sophy Milligan MD - Primary     * Matheus Matson MD - Consult     Assisting Surgeon: None     Pre-op Diagnosis:  Abnormal uterine bleeding [N93.9]  Menorrhagia with irregular cycle [N92.1]     Post-op Diagnosis:  Post-Op Diagnosis Codes:     * Abnormal uterine bleeding [N93.9]     * Menorrhagia with irregular cycle [N92.1]  Incidental cystotomy     Procedure(s) (LRB):  XI ROBOTIC HYSTERECTOMY,WITH SALPINGECTOMY (N/A)  CYSTOSCOPY (N/A)  REPAIR, BLADDER (N/A)     Anesthesia: General     Implants:  * No implants in log *     Operative Findings: Omental adhesions to the anterior abdominal wall, thick adhesions of the bladder to the anterior cervix, 10 wk size uterus with a small posterior uterine leiomyoma, normal appearing bilateral fallopian tubes and ovaries. Incidental cystotomy to the dome of the bladder.     Estimated Blood Loss: 30 mL     Estimated Blood Loss has been documented.         Specimens: cervix, uterus, bilateral fallopian tubes  Specimen (24h ago, onward)          Start     Ordered     10/25/23 0950   Surgical Pathology  RELEASE UPON ORDERING         10/25/23 0950        Procedure description:    The patient was taken to the operating room with IV fluids running and pneumatic compression stockings applied to the lower extremities and turned on prior to the beginning of the procedure. General anesthesia was then obtained without difficulty. The patient was then placed in the dorsal lithotomy position with Sam type stirrups. Her arms were tucked bilaterally. The patient was then prepped and draped. A time-out was then performed with Dr. Milligan present. Preoperative antibiotics were confirmed.    A Morris catheter was placed in the bladder and inflated with 10 mL of sterile saline. A weighted speculum was placed in the vagina and the anterior lip of the cervix was grasped with a single-tooth tenaculum. Two figure-of-eight  stitches were placed with Vicryl at 10 and 2 o'clock position to be used for retraction. The uterus was sounded to 10 cm. An Lizbeth uterine manipulator tip and a small KOH cup was selected. Once the LIZBETH handle was assembled, it was placed in the uterus for manipulation and secured by instilling sterile saline in the LIZBETH tip balloon. Attention was then turned to the abdomen.    The infraumbilical area was anesthetized with Marcaine with epinephrine. A vertical skin incision was made with a scalpel. First, a Verres needle was attempted to be placed twice, but was unsuccessful. Therefore, this was abandoned and the decision was made to proceed with an Optiview approach. An 5 mm trocar was placed in the intraabdominal cavity via Optiview technique. Pneumoperitoneum was achieved with CO2 gas, and the above findings were noted. The patient was placed in extreme Trendelenburg position. The decision was made to do a 3-port robotic hysterectomy at this time. Two additional robotic ports were placed approximately 12-15 cm lateral to the umbilicus. The skin was anesthetized with Marcaine with epinephrine. A peritoneal bleb was noted on the anterior aspect of the abdomen. The skin was incised with a scalpel and two 8 mm robotic trocars were placed lateral to midline.  After that, one 8 mm trocar was then inserted just superiorly and lateral to the infraumbilical port on the left to use for surgical assistance. This was replaced with a 5 mm trocar. The 5 mm trocar was replaced with a robotic 8 mm trocar. All of these trocars were inserted under direct visualization.  At this point it was noted that there were omental adhesions on the left anterior abdominal wall. These were taken down with the LigaSure via fulguration and cutting. Excellent hemostasis noted. Then the LigaSure was removed and the 5 mm camera was removed.  The patient side cart was docked on the patient's right side. A bipolar forceps and monopolar scissors were  introduced into the abdominal cavity under direct laparoscopic visualization. The instruments were placed next to the uterus. Attention was then turned to the surgeon's console.    Starting on the right side, the ovary was grasped with a grasper and retracted to the opposite side. The ureter was identified and found to be well away from the operative field. The tube was carried out to the fimbriae and the mesosalpinx was then serially transected using the monopolar scissors to the cornu. The bipolar forceps and monopolar scissors were then used to sequentially clamp, coagulate and cut the broad ligament, heading towards the round ligament. The round ligament was then serially clamped, coagulated, and cut. The utero-ovarian ligament was then serially clamped, coagulated and transected. This was repeated on the opposite side. The anterior leaf of the peritoneum was dissected down to the level of the vesicouterine peritoneum with care to avoid vital structures. The posterior peritoneum was then dissected inferiorly. The vesicouterine peritoneum was then opened using monopolar scissors and the bladder was dissected off the lower uterine segment and upper vagina. At this point it was noted that there were thick adhesions and fatty adhesions from the bladder to the anterior uterine peritoneum. These were taken down with sharp and blunt dissection. Then the Monopolar scissors were switched out for the Vessel Sealer.     The uterine vessels were skeletonized using the Vessel Sealer and bipolar forceps. The bipolar foceps and Vessel sealer were then used to coagulate and cut the uterine vessels. Theses were found to be hemostatic.   Using the NADIRA device and pushing the uterus into the pelvis, the cervicovaginal junction was delineated by the colpotomy ring.  The colpotomy was then made starting on the anterior aspect of the cervicovaginal junction, and coming around posteriorly, following the cup as a guide. The vaginal  angles were coagulated using the bipolar forceps while making the colpotomy laterally.    Once the uterus was completely detached, the uterus, cervix, and bilateral tubes were successfully removed from the abdomen. It was noted at this point that the Morris balloon was seen and there was an incidental cystotomy about 3 cm in length at the dome of the bladder just anterior to the vaginal cuff. Dr. Matson was called for assistance. (Dr. Espinosa--Urology-- was also called, but he was a case.)  The vaginal cuff was then closed using 2-0 V-Loc suture in a running fashion. The pelvis was thoroughly irrigated and excellent hemostasis was noted in the operative field.  The cystotomy was closed with 3-0 Chromic in a running layer, then figure of eight sutures, and then a third layer bringing together the peritoneum over the cystotomy in a running fashion of 3-0 Chromic. Excellent hemostasis noted.    The robot was then undocked and the patient was taken out steep Trendelenburg.     A cystoscopy was then performed and the cystotomy repair site was noted to be intact. Bilateral ureteral jets were visualized. The scope was removed and a new Morris catheter was placed.    Skin incisions were then closed in a subcuticular fashion using 4-0 monocryl suture. Dermabond placed over the incisions.     The patient tolerated the procedure well. All the counts were correct x2. The patient was taken to the recovery room in stable condition.

## 2023-10-25 NOTE — ANESTHESIA POSTPROCEDURE EVALUATION
Anesthesia Post Evaluation    Patient: Kaylene Pryor    Procedure(s) Performed: Procedure(s) (LRB):  XI ROBOTIC HYSTERECTOMY,WITH SALPINGECTOMY (N/A)  CYSTOSCOPY (N/A)  REPAIR, BLADDER (N/A)    Final Anesthesia Type: general      Patient location during evaluation: PACU  Patient participation: Yes- Able to Participate  Level of consciousness: awake and sedated  Post-procedure vital signs: reviewed and stable  Pain management: adequate  Airway patency: patent    PONV status at discharge: No PONV  Anesthetic complications: no      Cardiovascular status: blood pressure returned to baseline  Respiratory status: unassisted  Hydration status: euvolemic  Follow-up not needed.          Vitals Value Taken Time   /87 10/25/23 1316   Temp 36.7 °C (98.1 °F) 10/25/23 1132   Pulse 92 10/25/23 1328   Resp 16 10/25/23 1315   SpO2 89 % 10/25/23 1328   Vitals shown include unvalidated device data.      Event Time   Out of Recovery 12:25:00         Pain/Milan Score: Pain Rating Prior to Med Admin: 7 (10/25/2023 11:57 AM)  Milan Score: 10 (10/25/2023 12:35 PM)

## 2023-10-25 NOTE — PROGRESS NOTES
Called by RN secondary to the patient's abdomen being distended. Presented to the bedside. On exam her vitals are stable. She is resting and only appears mildly uncomfortable.   On exam, her incisions are c/d/I and covered with Dermabond. Her abdomen is moderately distended, but soft. She has some diffuse TTP. She has a h/o abdominoplasty so her abdominal skin is tight at baseline.   STAT CBC ordered. Will re-evaluate once the CBC has resulted.  Morris in and draining yellow urine.

## 2023-10-25 NOTE — TRANSFER OF CARE
"Anesthesia Transfer of Care Note    Patient: Kaylene Pryor    Procedure(s) Performed: Procedure(s) (LRB):  XI ROBOTIC HYSTERECTOMY,WITH SALPINGECTOMY (N/A)  CYSTOSCOPY (N/A)  REPAIR, BLADDER (N/A)    Patient location: PACU    Anesthesia Type: general    Transport from OR: Transported from OR on 6-10 L/min O2 by face mask with adequate spontaneous ventilation    Post pain: adequate analgesia    Post assessment: no apparent anesthetic complications and tolerated procedure well    Post vital signs: stable    Level of consciousness: alert and responds to stimulation    Nausea/Vomiting: no nausea/vomiting    Complications: none    Transfer of care protocol was followedComments: 7.0 mm nasal airway inserted in left nare atraumatically upon arrival to PACU.      Last vitals:   Visit Vitals  BP (!) 140/77   Pulse 97   Temp 36.7 °C (98.1 °F) (Oral)   Resp 14   Ht 5' 8" (1.727 m)   Wt 120.2 kg (265 lb)   SpO2 97%   Breastfeeding No   BMI 40.29 kg/m²     "

## 2023-10-25 NOTE — PROGRESS NOTES
1127 RECEIVED TO RR ASLEEP, NASAL AIRWAY IN PLACE. O2 VIA FM. COLOR PINK. HOB ELEVATED. ABDOMEN DISTENDED SLIGHTLY FIRM, OP-SITES X4 APPROXIMATED WITH DERMABOND ADHESIVE DRESSING. LÁZARO PAD IN PLACE, NO VAGINAL BLEEDING NOTED. COPELAND CATH IN PLACE P/D YELLOW URINE. SCD HOSE IN PROGRESS. IV INFUSING WELL RIGHT HAND 22G. CATH. DR. PENG MADE AWARE OF ABDOMINAL DISTENSION AND FIRMNESS PER OR CIRCULATOR, PITER. OBSERVING CLOSELY. SEE FLOW SHEET.    1135 DR. PENG AT BEDSIDE, ASSESSING PATIENT ABDOMEN. CBC ORDERED.    1150 CBC DRAWN PER . DILAUDID GIVEN FOR PAIN.    1225 DR. PENG AT BEDSIDE, ASSESSING AND SPEAKING WITH PATIENT. ABDOMEN REMAINS DISTENDED. SCANT RED DRAINAGE ON LÁZARO PAD. DR. PENG AWARE OF H&H RESULTS. PAIN LEVEL 2-3 ON 1-10 SCALE. NO DISTRESS NOTED. TRANSFERRED TO ASC UNTIL BED AVAILABLE UP STAIRS.

## 2023-10-25 NOTE — BRIEF OP NOTE
[facility] - Periop [facility]  Brief Operative Note    SUMMARY     Surgery Date: 10/25/2023     Surgeon(s) and Role:     * Sophy Milligan MD - Primary     * Matheus Matson MD - Consult    Assisting Surgeon: None    Pre-op Diagnosis:  Abnormal uterine bleeding [N93.9]  Menorrhagia with irregular cycle [N92.1]    Post-op Diagnosis:  Post-Op Diagnosis Codes:     * Abnormal uterine bleeding [N93.9]     * Menorrhagia with irregular cycle [N92.1]  Incidental cystotomy    Procedure(s) (LRB):  XI ROBOTIC HYSTERECTOMY,WITH SALPINGECTOMY (N/A)  CYSTOSCOPY (N/A)  REPAIR, BLADDER (N/A)    Anesthesia: General    Implants:  * No implants in log *    Operative Findings: Omental adhesions to the anterior abdominal wall, thick adhesions of the bladder to the anterior cervix, 10 wk size uterus with a small posterior uterine leiomyoma, normal appearing bilateral fallopian tubes and ovaries. Incidental cystotomy to the dome of the bladder.    Estimated Blood Loss: 30 mL    Estimated Blood Loss has been documented.         Specimens: cervix, uterus, bilateral fallopian tubes  Specimen (24h ago, onward)       Start     Ordered    10/25/23 0950  Surgical Pathology  RELEASE UPON ORDERING         10/25/23 0994                    [id number]

## 2023-10-25 NOTE — ANESTHESIA PROCEDURE NOTES
Intubation    Date/Time: 10/25/2023 7:47 AM    Performed by: Jamari Miller Jr., CRNA  Authorized by: Shawn Sims MD    Intubation:     Induction:  Intravenous    Intubated:  Postinduction    Mask Ventilation:  Easy with oral airway    Attempts:  1    Attempted By:  CRNA    Method of Intubation:  Direct    Blade:  Jeni 3    Laryngeal View Grade: Grade IIA - cords partially seen      Difficult Airway Encountered?: No      Complications:  None    Airway Device:  Oral endotracheal tube    Airway Device Size:  7.5    Style/Cuff Inflation:  Cuffed    Tube secured:  21    Secured at:  The teeth    Placement Verified By:  Capnometry    Complicating Factors:  Obesity    Findings Post-Intubation:  Atraumatic/condition of teeth unchanged

## 2023-10-26 VITALS
HEART RATE: 84 BPM | RESPIRATION RATE: 18 BRPM | HEIGHT: 68 IN | SYSTOLIC BLOOD PRESSURE: 120 MMHG | BODY MASS INDEX: 40.16 KG/M2 | OXYGEN SATURATION: 96 % | TEMPERATURE: 98 F | DIASTOLIC BLOOD PRESSURE: 66 MMHG | WEIGHT: 265 LBS

## 2023-10-26 LAB
BASOPHILS # BLD AUTO: 0.03 K/UL (ref 0–0.2)
BASOPHILS NFR BLD AUTO: 0.2 % (ref 0–1)
DHEA SERPL-MCNC: NORMAL
DIFFERENTIAL METHOD BLD: ABNORMAL
EOSINOPHIL # BLD AUTO: 0 K/UL (ref 0–0.5)
EOSINOPHIL NFR BLD AUTO: 0 % (ref 1–4)
ERYTHROCYTE [DISTWIDTH] IN BLOOD BY AUTOMATED COUNT: 13.3 % (ref 11.5–14.5)
ESTROGEN SERPL-MCNC: NORMAL PG/ML
HCT VFR BLD AUTO: 34.1 % (ref 38–47)
HGB BLD-MCNC: 10.4 G/DL (ref 12–16)
IMM GRANULOCYTES # BLD AUTO: 0.15 K/UL (ref 0–0.04)
IMM GRANULOCYTES NFR BLD: 0.8 % (ref 0–0.4)
INSULIN SERPL-ACNC: NORMAL U[IU]/ML
LAB AP GROSS DESCRIPTION: NORMAL
LAB AP LABORATORY NOTES: NORMAL
LYMPHOCYTES # BLD AUTO: 1.53 K/UL (ref 1–4.8)
LYMPHOCYTES NFR BLD AUTO: 8.4 % (ref 27–41)
MCH RBC QN AUTO: 26.4 PG (ref 27–31)
MCHC RBC AUTO-ENTMCNC: 30.5 G/DL (ref 32–36)
MCV RBC AUTO: 86.5 FL (ref 80–96)
MONOCYTES # BLD AUTO: 1.31 K/UL (ref 0–0.8)
MONOCYTES NFR BLD AUTO: 7.2 % (ref 2–6)
MPC BLD CALC-MCNC: 10.9 FL (ref 9.4–12.4)
NEUTROPHILS # BLD AUTO: 15.18 K/UL (ref 1.8–7.7)
NEUTROPHILS NFR BLD AUTO: 83.4 % (ref 53–65)
NRBC # BLD AUTO: 0 X10E3/UL
NRBC, AUTO (.00): 0 %
PLATELET # BLD AUTO: 375 K/UL (ref 150–400)
RBC # BLD AUTO: 3.94 M/UL (ref 4.2–5.4)
T3RU NFR SERPL: NORMAL %
WBC # BLD AUTO: 18.2 K/UL (ref 4.5–11)

## 2023-10-26 PROCEDURE — 85025 COMPLETE CBC W/AUTO DIFF WBC: CPT | Performed by: OBSTETRICS & GYNECOLOGY

## 2023-10-26 PROCEDURE — 63600175 PHARM REV CODE 636 W HCPCS: Performed by: OBSTETRICS & GYNECOLOGY

## 2023-10-26 PROCEDURE — 99024 POSTOP FOLLOW-UP VISIT: CPT | Mod: ,,, | Performed by: OBSTETRICS & GYNECOLOGY

## 2023-10-26 PROCEDURE — 25000003 PHARM REV CODE 250: Performed by: OBSTETRICS & GYNECOLOGY

## 2023-10-26 PROCEDURE — 99024 PR POST-OP FOLLOW-UP VISIT: ICD-10-PCS | Mod: ,,, | Performed by: OBSTETRICS & GYNECOLOGY

## 2023-10-26 RX ORDER — HYDROCODONE BITARTRATE AND ACETAMINOPHEN 5; 325 MG/1; MG/1
1 TABLET ORAL EVERY 4 HOURS PRN
Qty: 30 TABLET | Refills: 0 | Status: SHIPPED | OUTPATIENT
Start: 2023-10-26

## 2023-10-26 RX ORDER — SODIUM CHLORIDE, SODIUM LACTATE, POTASSIUM CHLORIDE, CALCIUM CHLORIDE 600; 310; 30; 20 MG/100ML; MG/100ML; MG/100ML; MG/100ML
INJECTION, SOLUTION INTRAVENOUS CONTINUOUS
Status: DISCONTINUED | OUTPATIENT
Start: 2023-10-26 | End: 2023-10-26 | Stop reason: HOSPADM

## 2023-10-26 RX ORDER — LIDOCAINE HYDROCHLORIDE 10 MG/ML
1 INJECTION, SOLUTION EPIDURAL; INFILTRATION; INTRACAUDAL; PERINEURAL ONCE
Status: DISCONTINUED | OUTPATIENT
Start: 2023-10-26 | End: 2023-10-26 | Stop reason: HOSPADM

## 2023-10-26 RX ORDER — PHENAZOPYRIDINE HYDROCHLORIDE 200 MG/1
200 TABLET, FILM COATED ORAL 3 TIMES DAILY PRN
Qty: 9 TABLET | Refills: 0 | Status: SHIPPED | OUTPATIENT
Start: 2023-10-26 | End: 2023-10-29

## 2023-10-26 RX ORDER — IBUPROFEN 800 MG/1
800 TABLET ORAL 3 TIMES DAILY PRN
Qty: 30 TABLET | Refills: 1 | Status: SHIPPED | OUTPATIENT
Start: 2023-10-26

## 2023-10-26 RX ADMIN — MUPIROCIN 1 G: 20 OINTMENT TOPICAL at 09:10

## 2023-10-26 RX ADMIN — HYDROMORPHONE HYDROCHLORIDE 1 MG: 2 INJECTION INTRAMUSCULAR; INTRAVENOUS; SUBCUTANEOUS at 01:10

## 2023-10-26 NOTE — DISCHARGE SUMMARY
[facility] - Orthopedic  Discharge Note  Short Stay    Procedure(s) (LRB):  XI ROBOTIC HYSTERECTOMY,WITH SALPINGECTOMY (N/A)  CYSTOSCOPY (N/A)  REPAIR, BLADDER (N/A)      OUTCOME: Patient tolerated treatment/procedure well without complication and is now ready for discharge.    DISPOSITION: Home or Self Care    FINAL DIAGNOSIS:  Abnormal uterine bleeding    FOLLOWUP: In clinic    DISCHARGE INSTRUCTIONS:    Discharge Procedure Orders   Diet Adult Regular     Pelvic Rest   Order Comments: X 10 weeks     Notify your health care provider if you experience any of the following:  temperature >100.4     Notify your health care provider if you experience any of the following:  persistent nausea and vomiting or diarrhea     Notify your health care provider if you experience any of the following:  severe uncontrolled pain     Notify your health care provider if you experience any of the following:  difficulty breathing or increased cough     Notify your health care provider if you experience any of the following:  severe persistent headache     Notify your health care provider if you experience any of the following:  worsening rash     Notify your health care provider if you experience any of the following:  persistent dizziness, light-headedness, or visual disturbances     Notify your health care provider if you experience any of the following:  increased confusion or weakness     Notify your health care provider if you experience any of the following:   Order Comments: Vaginal bleeding like heavy menses, foul smelling vaginal discharge, purulent incisional drainage, or any other acute changes.     Lifting restrictions   Order Comments: No heavy lifting > 10 lbs x 2 weeks     No driving until:   Order Comments: Not on narcotic pain medication and no pain when pressing the brake.     Activity as tolerated        TIME SPENT ON DISCHARGE: 20 minutes

## 2023-10-26 NOTE — DISCHARGE SUMMARY
[facility]  Obstetrics  Discharge Summary      Patient Name: Kaylene Pryor  MRN: [id number]  Admission Date: 10/25/2023  Hospital Length of Stay: 0 days  Discharge Date and Time:  10/26/2023 7:46 AM  Attending Physician: Sophy Milligan MD   Discharging Provider: Sophy Milligan MD  Primary Care Provider: Jamari Stout MD    HPI: 43 yo  with a h/o abnormal uterine bleeding that was not well controlled with medication and a D&C desired definitive surgical management via a hysterectomy. She also had AUDREY that was worked up by Dr. Matson and a TOT was planned at the time of surgery.     Procedure(s) (LRB):  XI ROBOTIC HYSTERECTOMY,WITH SALPINGECTOMY (N/A)  CYSTOSCOPY (N/A)  REPAIR, BLADDER (N/A)     Hospital Course: She underwent a robotic hysterectomy with bilateral salpingectomy. However, at the time of the hysterectomy, there was a bladder injury. Therefore, this was repaired with 3-0 Chromic in 3 layers. Then a cystoscopy was performed that revealed the bladder repair site was intact. Also, the ureteral jets were effluxing normally bilaterally.   Due to the bladder injury, the decision was made to not perform the TOT and defer this to a later time.   Her Morris was left in situ due to the bladder injury x 10-14 days.  She was transferred to the floor where her postop course was uneventful and she was deemed stable for discharge home on POD#1.         Final Active Diagnoses:    Diagnosis Date Noted POA    PRINCIPAL PROBLEM:  Abnormal uterine bleeding [N93.9] 2023 Yes    AUDREY (stress urinary incontinence, female) [N39.3] 10/25/2023 Yes    Complication of cystostomy [N99.518] 10/25/2023 No    Fibroid [D21.9] 2023 Yes    Dysmenorrhea [N94.6] 2023 Yes      Problems Resolved During this Admission:        Labs: BMP:   Recent Labs   Lab 10/25/23  0640   *      K 3.8      CO2 26   BUN 12   CREATININE 0.89   CALCIUM 9.0    and CBC   Recent Labs   Lab  10/25/23  0640 10/25/23  1155 10/26/23  0423   WBC 10.67 14.67* 18.20*   HGB 12.2 12.6 10.4*   HCT 38.9 41.4 34.1*    393 375       Immunizations       None            This patient has no babies on file.  Pending Diagnostic Studies:       Procedure Component Value Units Date/Time    EXTRA TUBES [[id number]] Collected: 10/26/23 0423    Order Status: Sent Lab Status: In process Updated: 10/26/23 0454    Specimen: Blood, Venous     Narrative:      The following orders were created for panel order EXTRA TUBES.  Procedure                               Abnormality         Status                     ---------                               -----------         ------                     Red Top Hold[[id number]]                                    In process                   Please view results for these tests on the individual orders.            Discharged Condition: good    Disposition: Home or Self Care    Follow Up: Dr. Milligan in 1 week as scheduled for postop care.   Follow-up Information       Sophy Milligan MD Follow up in 1 week(s).    Specialty: Obstetrics and Gynecology  Why: postop care as scheduled.  Contact information:  [address] [facility] [address]  [phone]                           Patient Instructions: See discharge instructions.     Diet Adult Regular     Pelvic Rest   Order Comments: X 10 weeks     Notify your health care provider if you experience any of the following:  temperature >100.4     Notify your health care provider if you experience any of the following:  persistent nausea and vomiting or diarrhea     Notify your health care provider if you experience any of the following:  severe uncontrolled pain     Notify your health care provider if you experience any of the following:  difficulty breathing or increased cough     Notify your health care provider if you experience any of the following:  severe persistent headache     Notify your health care provider if you experience any of  the following:  worsening rash     Notify your health care provider if you experience any of the following:  persistent dizziness, light-headedness, or visual disturbances     Notify your health care provider if you experience any of the following:  increased confusion or weakness     Notify your health care provider if you experience any of the following:   Order Comments: Vaginal bleeding like heavy menses, foul smelling vaginal discharge, purulent incisional drainage, or any other acute changes.     Lifting restrictions   Order Comments: No heavy lifting > 10 lbs x 2 weeks     No driving until:   Order Comments: Not on narcotic pain medication and no pain when pressing the brake.     Activity as tolerated     Medications:  See Med rec: Norco 5 mg po q 4 h prn pain and Ibuprofen 800 mg po q 8 h prn pain.  Current Discharge Medication List        START taking these medications    Details   HYDROcodone-acetaminophen (NORCO) 5-325 mg per tablet Take 1 tablet by mouth every 4 (four) hours as needed for Pain.  Qty: 30 tablet, Refills: 0    Comments: n/a       ibuprofen (ADVIL,MOTRIN) 800 MG tablet Take 1 tablet (800 mg total) by mouth 3 (three) times daily as needed for Pain.  Qty: 30 tablet, Refills: 1      phenazopyridine (PYRIDIUM) 200 MG tablet Take 1 tablet (200 mg total) by mouth 3 (three) times daily as needed for Pain (bladder spasms).  Qty: 9 tablet, Refills: 0           CONTINUE these medications which have NOT CHANGED    Details   amLODIPine (NORVASC) 5 MG tablet Take 1 tablet (5 mg total) by mouth once daily.  Qty: 30 tablet, Refills: 11    Comments: .  Associated Diagnoses: Primary hypertension             Sophy Milligan MD  Obstetrics  [facility]

## 2023-10-26 NOTE — DISCHARGE INSTRUCTIONS
Care for Morris catheter as instructed. May use leg bag prn.    *Notify your healthcare provider if you experience any of the following: temperature >100.4  * Notify your healthcare provider if you experience any of the following: redness, tenderness.    *Notify your healthcare provider if you experience any of the following: difficulty breathing or     increased cough.  *Notify your physician if you experience any persistent nausea, vomiting, or diarrhea or headache  *Notify your physician if you experience any of the following:severe uncontrolled pain;worsening rash, increased confusion or weakness; dizziness, lightheadedness or visual disturbances      Notify your health care provider if you experience any of the following:   Order Comments: Vaginal bleeding like heavy menses, foul smelling vaginal discharge, purulent incisional drainage, or any other acute changes.          Lifting restrictions   Order Comments: No heavy lifting > 10 lbs x 2 weeks          No driving until:   Order Comments: Not on narcotic pain medication and no pain when pressing the brake.      Activity as tolerated

## 2023-10-26 NOTE — NURSING
Discharge instructions given and reviewed with patient , VU given per patient. IV dcd with cath tip intact . Pt tolerated well.  Pt taken to POV via wc with transport at this time. Pt tolerated well.

## 2023-10-26 NOTE — PROGRESS NOTES
"Postoperative Note    Assessment/Plan:  42 y.o. with Abnormal uterine bleeding [N93.9]  Menorrhagia with irregular cycle [N92.1] s/p XI ROBOTIC HYSTERECTOMY,WITH SALPINGECTOMY, CYSTOSCOPY, REPAIR, BLADDER, 1 Day Post-Op:    She is doing well this morning.   Desires d/c home.  RN to do Morris teaching and leg bag teaching.  Discharge instructions reviewed.  RTC in 1 week for postop care.  Plan to remove Morris in 12 days on 11/6/2023.      CC: Abnormal uterine bleeding      Subjective:  S/p XI ROBOTIC HYSTERECTOMY,WITH SALPINGECTOMY, CYSTOSCOPY, REPAIR, BLADDER, 1 Day Post-Op    Pt seen and examined.  Doing well. Pain is well controlled with pain medication. She is passing flatus and ambulating in room. She has a Morris in situ due to incidental cystotomy at the time of hysterectomy. UOP is clear and > 100 cc/h.     Review of Systems - The following ROS was otherwise negative, except as noted in the HPI:  constitutional, respiratory, cardiovascular, gastrointestinal, genitourinary    Objective:  /66   Pulse 84   Temp 98.3 °F (36.8 °C)   Resp 18   Ht 5' 8" (1.727 m)   Wt 120.2 kg (265 lb)   SpO2 96%   Breastfeeding No   BMI 40.29 kg/m²   General:  Alert, well appearing, no acute distress  Abdomen:  Soft, appropriately tender to palpation, mildly distended (less than yesterday)  Incision:  4 laparoscopic incisions are well healed, appears c/d/i, no significant drainage or erythema  Extremities:  No redness or tenderness in LE, neg Michael's sign    Path:   Specimens (From admission, onward)       Start     Ordered    10/25/23 0950  Surgical Pathology  RELEASE UPON ORDERING         10/25/23 0950                   "

## 2023-10-26 NOTE — PLAN OF CARE
[facility]  Discharge Final Note    Primary Care Provider: Jamari Stout MD    Expected Discharge Date: 10/26/2023    Final Discharge Note (most recent)       Final Note - 10/26/23 1020          Final Note    Assessment Type Final Discharge Note     Anticipated Discharge Disposition Home or Self Care                     Important Message from Medicare             Contact Info       Sophy Milligan MD   Specialty: Obstetrics and Gynecology    [address] [facility] [address]   Phone: [phone]       Next Steps: Follow up in 1 week(s)    Instructions: postop care as scheduled.  Please follow up on November 2 at 1:45          Patient discharged home prior to being seen by cm. No consults for any dc needs.

## 2023-11-02 ENCOUNTER — OFFICE VISIT (OUTPATIENT)
Dept: OBSTETRICS AND GYNECOLOGY | Facility: CLINIC | Age: 43
End: 2023-11-02
Payer: OTHER GOVERNMENT

## 2023-11-02 VITALS
OXYGEN SATURATION: 99 % | RESPIRATION RATE: 18 BRPM | DIASTOLIC BLOOD PRESSURE: 76 MMHG | HEIGHT: 68 IN | WEIGHT: 260 LBS | SYSTOLIC BLOOD PRESSURE: 126 MMHG | BODY MASS INDEX: 39.4 KG/M2 | HEART RATE: 119 BPM

## 2023-11-02 DIAGNOSIS — Z97.8 FOLEY CATHETER IN PLACE: ICD-10-CM

## 2023-11-02 DIAGNOSIS — N32.89 BLADDER SPASM: Primary | ICD-10-CM

## 2023-11-02 DIAGNOSIS — Z48.89 ENCOUNTER FOR POSTOPERATIVE CARE: ICD-10-CM

## 2023-11-02 PROCEDURE — 99024 POSTOP FOLLOW-UP VISIT: CPT | Mod: ,,, | Performed by: OBSTETRICS & GYNECOLOGY

## 2023-11-02 PROCEDURE — 99024 PR POST-OP FOLLOW-UP VISIT: ICD-10-PCS | Mod: ,,, | Performed by: OBSTETRICS & GYNECOLOGY

## 2023-11-02 PROCEDURE — 99214 OFFICE O/P EST MOD 30 MIN: CPT | Mod: PBBFAC | Performed by: OBSTETRICS & GYNECOLOGY

## 2023-11-02 RX ORDER — LIDOCAINE 50 MG/G
OINTMENT TOPICAL
Qty: 30 G | Refills: 1 | Status: SHIPPED | OUTPATIENT
Start: 2023-11-02

## 2023-11-02 RX ORDER — SULFAMETHOXAZOLE AND TRIMETHOPRIM 800; 160 MG/1; MG/1
1 TABLET ORAL 2 TIMES DAILY
Qty: 10 TABLET | Refills: 0 | Status: SHIPPED | OUTPATIENT
Start: 2023-11-02 | End: 2023-11-07

## 2023-11-02 RX ORDER — PHENAZOPYRIDINE HYDROCHLORIDE 200 MG/1
200 TABLET, FILM COATED ORAL 3 TIMES DAILY PRN
Qty: 9 TABLET | Refills: 0 | Status: SHIPPED | OUTPATIENT
Start: 2023-11-02 | End: 2023-11-05

## 2023-11-03 NOTE — PROGRESS NOTES
"Postoperative Note    Assessment/Plan:  42 y.o. with s/p total robotic hysterectomy with bilateral salpingectomy, incidental cystotomy repair, cystoscopy, POD#8.    Bladder spasms  Morris catheter in place.    Urine culture ordered due to increased risk of UTI with indwelling catheter. Treated empirically with Bactrim. Pyridium provided for bladder spasms. Lidocaine ointment provided to put around urethra for local pain relief.    Continue pain meds prn.     RTC in 4 days for Morris catheter removal.     CC:   Chief Complaint   Patient presents with    Post-op Evaluation     Fatigue after shower like may pass out and nauseous .       Subjective:  s/p total robotic hysterectomy with bilateral salpingectomy, incidental cystotomy repair, cystoscopy, POD#8.    Pt seen and examined. Overall she is doing well. She states that her abdominal pain is manageable. Bms are good. No constipation or diarrhea. She is having increased pain in her bladder and urethra from the catheter. She is ready to have it removed.  She did have a temp of 100.3 and 100.7 at home the past 2 days. She took a dose of Motrin and the fever resolved.   Temp today was 98.7.    Review of Systems - The following ROS was otherwise negative, except as noted in the HPI:  constitutional, respiratory, cardiovascular, gastrointestinal, genitourinary    Objective:  /76   Pulse (!) 119   Resp 18   Ht 5' 8" (1.727 m)   Wt 117.9 kg (260 lb)   SpO2 99%   BMI 39.53 kg/m²   General:  Alert, well appearing, no acute distress  Abdomen:  Soft, appropriately tender to palpation, non-distended  Incision:  4 laparoscopic sites are well healed, appears c/d/i, no significant drainage or erythema  Extremities:  No redness or tenderness in LE, neg Michael's sign  Morris catheter in place. Sample taken from the leg bag.    Path:   Final Diagnosis   A. Uterus with bilateral tubes, hysterectomy and bilateral salpingectomy:  - Benign uterine cervix with reactive squamous " epithelial changes (see comment)  - Benign uterine corpus with benign endometrium, superficial adenomyosis,  scar, and leiomyomata  - Benign uterine tubes

## 2023-11-06 ENCOUNTER — OFFICE VISIT (OUTPATIENT)
Dept: OBSTETRICS AND GYNECOLOGY | Facility: CLINIC | Age: 43
End: 2023-11-06
Payer: OTHER GOVERNMENT

## 2023-11-06 VITALS — SYSTOLIC BLOOD PRESSURE: 130 MMHG | DIASTOLIC BLOOD PRESSURE: 80 MMHG

## 2023-11-06 DIAGNOSIS — R39.15 URGENCY OF URINATION: ICD-10-CM

## 2023-11-06 DIAGNOSIS — N30.90 CYSTITIS: ICD-10-CM

## 2023-11-06 DIAGNOSIS — B37.31 CANDIDA VAGINITIS: ICD-10-CM

## 2023-11-06 DIAGNOSIS — Z09 POSTOP CHECK: Primary | ICD-10-CM

## 2023-11-06 LAB
BACTERIA #/AREA URNS HPF: ABNORMAL /HPF
BILIRUB UR QL STRIP: 0.5
CAOX CRY URNS QL MICRO: ABNORMAL /HPF
CLARITY UR: ABNORMAL
COLOR UR: ABNORMAL
GLUCOSE UR STRIP-MCNC: NORMAL MG/DL
KETONES UR STRIP-SCNC: NEGATIVE MG/DL
LEUKOCYTE ESTERASE UR QL STRIP: ABNORMAL
MUCOUS, UA: ABNORMAL /LPF
NITRITE UR QL STRIP: POSITIVE
PH UR STRIP: 6 PH UNITS
PROT UR QL STRIP: 50
RBC # UR STRIP: NEGATIVE /UL
RBC #/AREA URNS HPF: 108 /HPF
SP GR UR STRIP: 1.02
SQUAMOUS #/AREA URNS LPF: ABNORMAL /HPF
UROBILINOGEN UR STRIP-ACNC: 2 MG/DL
WBC #/AREA URNS HPF: 42 /HPF
YEAST #/AREA URNS HPF: ABNORMAL /HPF

## 2023-11-06 PROCEDURE — 87086 CULTURE, URINE: ICD-10-PCS | Mod: ,,, | Performed by: CLINICAL MEDICAL LABORATORY

## 2023-11-06 PROCEDURE — 87086 URINE CULTURE/COLONY COUNT: CPT | Mod: ,,, | Performed by: CLINICAL MEDICAL LABORATORY

## 2023-11-06 PROCEDURE — 99024 POSTOP FOLLOW-UP VISIT: CPT | Mod: ,,, | Performed by: OBSTETRICS & GYNECOLOGY

## 2023-11-06 PROCEDURE — 99024 PR POST-OP FOLLOW-UP VISIT: ICD-10-PCS | Mod: ,,, | Performed by: OBSTETRICS & GYNECOLOGY

## 2023-11-06 PROCEDURE — 87077 CULTURE, URINE: ICD-10-PCS | Mod: ,,, | Performed by: CLINICAL MEDICAL LABORATORY

## 2023-11-06 PROCEDURE — 87077 CULTURE AEROBIC IDENTIFY: CPT | Mod: ,,, | Performed by: CLINICAL MEDICAL LABORATORY

## 2023-11-06 PROCEDURE — 99213 OFFICE O/P EST LOW 20 MIN: CPT | Mod: PBBFAC | Performed by: OBSTETRICS & GYNECOLOGY

## 2023-11-06 PROCEDURE — 81001 URINALYSIS AUTO W/SCOPE: CPT | Mod: ,,, | Performed by: CLINICAL MEDICAL LABORATORY

## 2023-11-06 PROCEDURE — 87186 SC STD MICRODIL/AGAR DIL: CPT | Mod: ,,, | Performed by: CLINICAL MEDICAL LABORATORY

## 2023-11-06 PROCEDURE — 87186 CULTURE, URINE: ICD-10-PCS | Mod: ,,, | Performed by: CLINICAL MEDICAL LABORATORY

## 2023-11-06 PROCEDURE — 81001 URINALYSIS: ICD-10-PCS | Mod: ,,, | Performed by: CLINICAL MEDICAL LABORATORY

## 2023-11-06 NOTE — PATIENT INSTRUCTIONS
Follow-up appointment with Dr. Milligan in 2 weeks.      Appointment with me in early February 2024 for discussion of proceeding with mid urethral sling repair.      We will follow-up pending urine culture.

## 2023-11-06 NOTE — PROGRESS NOTES
Subjective:       Patient ID: Kaylene Pryor is a 42 y.o. female.    Chief Complaint: Post-op Evaluation (Here for 12 days post op check after RAH/BILAT SALPINGECTOMY, CYSTOTOMY REPAIR-HERE FOR REMOVAL OF COPELAND CATH. )    Patient now 12 days post robotic hysterectomy.      During procedure there was inadvertent cystotomy performed.  Repaired by Dr. Milligan.      She is worn Copeland catheter for 12 days.  Scheduled for removal today.      Dr. Milligan was sick today and I agreed to remove Copeland catheter.      Copeland catheter was removed without difficulty.    Gynecologic Exam  The patient's pertinent negatives include no genital itching, genital lesions, genital odor, genital rash, missed menses, pelvic pain, vaginal bleeding or vaginal discharge. The pain is moderate. She is not pregnant.     Review of Systems   Genitourinary:  Negative for missed menses, pelvic pain and vaginal discharge.         Objective:      Physical Exam  Genitourinary:     Comments: Ocpeland catheter was removed without difficulty.    Urine culture pending.        Assessment:       1. Postop check    2. Urgency of urination        Plan:       Patient Instructions   Follow-up appointment with Dr. Milligan in 2 weeks.      Appointment with me in early February 2024 for discussion of proceeding with mid urethral sling repair.      We will follow-up pending urine culture.

## 2023-11-07 ENCOUNTER — PATIENT MESSAGE (OUTPATIENT)
Dept: OBSTETRICS AND GYNECOLOGY | Facility: CLINIC | Age: 43
End: 2023-11-07
Payer: OTHER GOVERNMENT

## 2023-11-08 ENCOUNTER — TELEPHONE (OUTPATIENT)
Dept: OBSTETRICS AND GYNECOLOGY | Facility: CLINIC | Age: 43
End: 2023-11-08
Payer: OTHER GOVERNMENT

## 2023-11-08 LAB
UA COMPLETE W REFLEX CULTURE PNL UR: ABNORMAL
UA COMPLETE W REFLEX CULTURE PNL UR: ABNORMAL

## 2023-11-08 RX ORDER — FLUCONAZOLE 150 MG/1
TABLET ORAL
Qty: 2 TABLET | Refills: 0 | Status: SHIPPED | OUTPATIENT
Start: 2023-11-08 | End: 2023-12-27 | Stop reason: SDUPTHER

## 2023-11-08 RX ORDER — NITROFURANTOIN 25; 75 MG/1; MG/1
100 CAPSULE ORAL 2 TIMES DAILY
Qty: 14 CAPSULE | Refills: 0 | Status: SHIPPED | OUTPATIENT
Start: 2023-11-08 | End: 2023-11-15

## 2023-11-08 NOTE — TELEPHONE ENCOUNTER
Notified of positive E coli culture.  Sensitive to Macrobid.  Rx Macrobid b.i.d. for 7 days.      Discussed positive yeast.  She was prescribed Diflucan  150 mg daily for 2 days.    She has a follow-up appointment with Dr. Milligan November 30th

## 2023-11-16 DIAGNOSIS — B37.31 VAGINAL CANDIDIASIS: Primary | ICD-10-CM

## 2023-11-16 RX ORDER — FLUCONAZOLE 150 MG/1
150 TABLET ORAL
Qty: 2 TABLET | Refills: 0 | Status: SHIPPED | OUTPATIENT
Start: 2023-11-16 | End: 2023-11-20

## 2023-11-30 ENCOUNTER — TELEPHONE (OUTPATIENT)
Dept: OBSTETRICS AND GYNECOLOGY | Facility: CLINIC | Age: 43
End: 2023-11-30
Payer: OTHER GOVERNMENT

## 2023-11-30 VITALS
BODY MASS INDEX: 40.62 KG/M2 | WEIGHT: 268 LBS | OXYGEN SATURATION: 100 % | HEIGHT: 68 IN | SYSTOLIC BLOOD PRESSURE: 118 MMHG | RESPIRATION RATE: 18 BRPM | HEART RATE: 95 BPM | DIASTOLIC BLOOD PRESSURE: 68 MMHG

## 2023-11-30 DIAGNOSIS — R42 DIZZINESS: ICD-10-CM

## 2023-11-30 DIAGNOSIS — N93.9 VAGINAL SPOTTING: ICD-10-CM

## 2023-11-30 DIAGNOSIS — R10.2 VAGINAL PAIN: ICD-10-CM

## 2023-11-30 DIAGNOSIS — Z48.89 ENCOUNTER FOR POSTOPERATIVE CARE: Primary | ICD-10-CM

## 2023-11-30 DIAGNOSIS — R73.09 ELEVATED RANDOM BLOOD GLUCOSE LEVEL: ICD-10-CM

## 2023-11-30 DIAGNOSIS — R39.89 BLADDER PAIN: ICD-10-CM

## 2023-11-30 DIAGNOSIS — E87.6 HYPOKALEMIA: Primary | ICD-10-CM

## 2023-11-30 PROCEDURE — 87086 CULTURE, URINE: ICD-10-PCS | Mod: ,,, | Performed by: CLINICAL MEDICAL LABORATORY

## 2023-11-30 PROCEDURE — 99024 POSTOP FOLLOW-UP VISIT: CPT | Mod: ,,, | Performed by: OBSTETRICS & GYNECOLOGY

## 2023-11-30 PROCEDURE — 99214 OFFICE O/P EST MOD 30 MIN: CPT | Mod: PBBFAC | Performed by: OBSTETRICS & GYNECOLOGY

## 2023-11-30 PROCEDURE — 87086 URINE CULTURE/COLONY COUNT: CPT | Mod: ,,, | Performed by: CLINICAL MEDICAL LABORATORY

## 2023-11-30 PROCEDURE — 99024 PR POST-OP FOLLOW-UP VISIT: ICD-10-PCS | Mod: ,,, | Performed by: OBSTETRICS & GYNECOLOGY

## 2023-11-30 RX ORDER — POTASSIUM CHLORIDE 20 MEQ/1
20 TABLET, EXTENDED RELEASE ORAL 2 TIMES DAILY
Qty: 6 TABLET | Refills: 0 | Status: SHIPPED | OUTPATIENT
Start: 2023-11-30 | End: 2023-12-03

## 2023-11-30 NOTE — TELEPHONE ENCOUNTER
----- Message from Sophy Milligan MD sent at 11/30/2023  2:37 PM CST -----  Please call her and tell her that she is not anemic. However, her potassium is low at 3.3. Therefore, I'm sending her a prescription for potassium to take x 3 days.   Her glucose was a little elevated. I'm going to order a hemoglobin A1C that she can complete at the lab at her next appointment.

## 2023-11-30 NOTE — PROGRESS NOTES
Return Gyn Office Visit    Assessment/Plan  Problem List Items Addressed This Visit    None  Visit Diagnoses       Encounter for postoperative care    -  Primary    Bladder pain        Relevant Orders    Urine culture    Vaginal spotting        Relevant Orders    US Pelvis Complete Non OB    Dizziness        Relevant Orders    CBC Auto Differential    Basic Metabolic Panel    Vaginal pain        Relevant Orders    US Pelvis Complete Non OB          Urine culture ordered for test of cure from previous bladder infection and due bladder discomfort.  Due to h/o vaginal spotting and sharp vaginal pain, pelvic US ordered to evaluate for any vaginal cuff hematoma or free fluid in the pelvis. Discussed that this is also normal healing symptoms from the hysterectomy.  Due to dizziness, CBC and BMP ordered.    Will f/u on results and discuss with patient further plan of care.   Return GYN visit on same day of US to follow up.  Discussed possible referral to pelvic floor PT if pain is persistent and work up is negative.     CC:   Chief Complaint   Patient presents with    Follow-up     HPI:  42 y.o. who presents to office for 5 week postop visit from [facility] with incidental cystotomy with repair. She had a Morris in situ x 12 days. Since it was removed, she has felt better. She does c/o some discomfort when she has a full bladder, but this is relieved when she voids.   She has had some episodes of dizziness over the last few weeks. She is still experiencing fatigue. No syncopal episodes. Occasionally over the last few weeks she has had a low grade fever, but it resolves.   She had 2 days of vaginal spotting a few days ago, but this has resolved as well.   She also c/o some intermittent episodes of sharp vaginal pain that only last a few seconds, but it occurs a few times per day and stops her in her tracks.   She also c/o feeling distended and bloated at times, but this resolves with time as well.      Review of Systems - The  "following ROS was otherwise negative, except as noted in the HPI:  constitutional, respiratory, cardiovascular, gastrointestinal, genitourinary    Objective:  /68   Pulse 95   Resp 18   Ht 5' 8" (1.727 m)   Wt 121.6 kg (268 lb)   LMP  (LMP Unknown)   SpO2 100%   BMI 40.75 kg/m²   General: Alert, well appearing, no acute distress  Abdomen: Soft, nontender, nondistended. Incisions: c/d/I.   Pelvic: Normal External genitalia without masses or lesions.  Moist, pink vagina with physiologic discharge. Minimal amount of old blood. Vaginal cuff intact. No bleeding or discharge from the vaginal cuff.   Cervix and uterus surgically absent.    Adnexa palpated bilaterally without masses or tenderness.    Bimanual examination without masses or tenderness.    Exam chaperoned by female assistant  Extremities: No redness or tenderness, neg Michael's sign  Psych: Normal mood and behavior.    Answers submitted by the patient for this visit:  Gynecologic Exam Questionnaire  (Submitted on 2023)  Chief Complaint: Gynecologic exam  genital itching: No  genital lesions: No  genital odor: No  genital rash: No  missed menses: No  pelvic pain: Yes  vaginal bleeding: No  vaginal discharge: No  Frequency: 2 to 4 times per day  Pain severity: mild  Pregnant now?: No  abdominal pain: Yes  anorexia: No  back pain: Yes  chills: No  constipation: No  diarrhea: No  discolored urine: No  dysuria: Yes  fever: No  flank pain: No  frequency: Yes  headaches: Yes  hematuria: No  nausea: No  painful intercourse: No  rash: No  urgency: Yes  vomiting: No  Passing clots?: No  Passing tissue?: No  STD: No  abdominal surgery: Yes   section: Yes  Ectopic pregnancy: No  Endometriosis: No  herpes simplex: No  gynecological surgery: Yes  menorrhagia: No  metrorrhagia: No  miscarriage: No  ovarian cysts: Yes  perineal abscess: No  PID: No  terminated pregnancy: No  vaginosis: No    "

## 2023-12-02 LAB — UA COMPLETE W REFLEX CULTURE PNL UR: NORMAL

## 2023-12-05 ENCOUNTER — OFFICE VISIT (OUTPATIENT)
Dept: FAMILY MEDICINE | Facility: CLINIC | Age: 43
End: 2023-12-05
Payer: OTHER GOVERNMENT

## 2023-12-05 VITALS
WEIGHT: 269 LBS | RESPIRATION RATE: 16 BRPM | OXYGEN SATURATION: 98 % | SYSTOLIC BLOOD PRESSURE: 158 MMHG | HEIGHT: 68 IN | DIASTOLIC BLOOD PRESSURE: 88 MMHG | BODY MASS INDEX: 40.77 KG/M2 | HEART RATE: 66 BPM

## 2023-12-05 DIAGNOSIS — R73.9 HYPERGLYCEMIA: Primary | ICD-10-CM

## 2023-12-05 DIAGNOSIS — J45.20 MILD INTERMITTENT ASTHMA WITHOUT COMPLICATION: ICD-10-CM

## 2023-12-05 DIAGNOSIS — E87.6 HYPOKALEMIA: ICD-10-CM

## 2023-12-05 LAB
ANION GAP SERPL CALCULATED.3IONS-SCNC: 7 MMOL/L (ref 7–16)
BUN SERPL-MCNC: 13 MG/DL (ref 7–18)
BUN/CREAT SERPL: 14 (ref 6–20)
CALCIUM SERPL-MCNC: 9.6 MG/DL (ref 8.5–10.1)
CHLORIDE SERPL-SCNC: 104 MMOL/L (ref 98–107)
CO2 SERPL-SCNC: 29 MMOL/L (ref 21–32)
CREAT SERPL-MCNC: 0.96 MG/DL (ref 0.55–1.02)
EGFR (NO RACE VARIABLE) (RUSH/TITUS): 75 ML/MIN/1.73M2
EST. AVERAGE GLUCOSE BLD GHB EST-MCNC: 134 MG/DL
GLUCOSE SERPL-MCNC: 139 MG/DL (ref 74–106)
HBA1C MFR BLD HPLC: 6.3 % (ref 4.5–6.6)
POTASSIUM SERPL-SCNC: 3.3 MMOL/L (ref 3.5–5.1)
SODIUM SERPL-SCNC: 137 MMOL/L (ref 136–145)

## 2023-12-05 PROCEDURE — 83036 HEMOGLOBIN A1C: ICD-10-PCS | Mod: ,,, | Performed by: CLINICAL MEDICAL LABORATORY

## 2023-12-05 PROCEDURE — 99213 OFFICE O/P EST LOW 20 MIN: CPT | Mod: ,,, | Performed by: FAMILY MEDICINE

## 2023-12-05 PROCEDURE — 80048 BASIC METABOLIC PNL TOTAL CA: CPT | Mod: ,,, | Performed by: CLINICAL MEDICAL LABORATORY

## 2023-12-05 PROCEDURE — 80048 BASIC METABOLIC PANEL: ICD-10-PCS | Mod: ,,, | Performed by: CLINICAL MEDICAL LABORATORY

## 2023-12-05 PROCEDURE — 99213 PR OFFICE/OUTPT VISIT, EST, LEVL III, 20-29 MIN: ICD-10-PCS | Mod: ,,, | Performed by: FAMILY MEDICINE

## 2023-12-05 PROCEDURE — 83036 HEMOGLOBIN GLYCOSYLATED A1C: CPT | Mod: ,,, | Performed by: CLINICAL MEDICAL LABORATORY

## 2023-12-05 RX ORDER — ALBUTEROL SULFATE 90 UG/1
2 AEROSOL, METERED RESPIRATORY (INHALATION) EVERY 6 HOURS PRN
Qty: 18 G | Refills: 0 | Status: SHIPPED | OUTPATIENT
Start: 2023-12-05 | End: 2024-12-04

## 2023-12-05 NOTE — PROGRESS NOTES
Jamari Stout MD        PATIENT NAME: Kaylene Pryor  : 1980  DATE: 23  MRN: [id number]      Billing Provider: Jamari Stout MD  Level of Service: [place] OFFICE/OUTPT VISIT, EST, LEVL III, 20-29 MIN  Patient PCP Information       Provider PCP Type    Jamrai Stout MD General            Reason for Visit / Chief Complaint: Abnormal Lab (Had hysterectomy potassium was low and glucose elevated)       Update PCP  Update Chief Complaint         History of Present Illness / Problem Focused Workflow     Kaylene Pryor presents to the clinic with Abnormal Lab (Had hysterectomy potassium was low and glucose elevated)     Potassium low and glucose high with ob/gyn.  Needs refill on asthma inhaler        Review of Systems     Review of Systems   Constitutional:  Negative for activity change, appetite change, fever and unexpected weight change.   HENT:  Negative for congestion, hearing loss, rhinorrhea, sinus pressure, sinus pain, sore throat and trouble swallowing.    Eyes:  Negative for photophobia, pain, discharge and visual disturbance.   Respiratory:  Negative for cough, chest tightness, wheezing and stridor.    Cardiovascular:  Negative for chest pain, palpitations and leg swelling.   Gastrointestinal:  Negative for abdominal pain, blood in stool, constipation, diarrhea, nausea and vomiting.   Endocrine: Negative for polydipsia, polyphagia and polyuria.   Genitourinary:  Negative for difficulty urinating, flank pain and hematuria.   Musculoskeletal:  Negative for arthralgias and neck pain.   Skin:  Negative for rash.   Allergic/Immunologic: Negative for food allergies.   Neurological:  Positive for headaches. Negative for dizziness, tremors, seizures, syncope and weakness (global weakness).   Psychiatric/Behavioral:  Negative for behavioral problems, confusion, decreased concentration, dysphoric mood and hallucinations. The patient is not nervous/anxious.         Medical / Social / Family History     Past  Medical History:   Diagnosis Date    Abnormal uterine bleeding  period. Been bleeding since     Fibroid     Told at ER i have a medium size one    Hypertension 2023    Just started meds    Obesity (BMI 35.0-39.9 without comorbidity)        Past Surgical History:   Procedure Laterality Date    BLADDER REPAIR N/A 10/25/2023    Procedure: REPAIR, BLADDER;  Surgeon: Sophy Milligan MD;  Location: [facility] OR;  Service: OB/GYN;  Laterality: N/A;     SECTION      CHOLECYSTECTOMY      COLPOSCOPY  Not sure    COSMETIC SURGERY   and     abdominoplasty    CYSTOSCOPY N/A 10/25/2023    Procedure: CYSTOSCOPY;  Surgeon: Sophy Milligan MD;  Location: [facility] OR;  Service: OB/GYN;  Laterality: N/A;    HYSTEROSCOPIC POLYPECTOMY OF UTERUS  2023    Procedure: POLYPECTOMY, UTERUS, HYSTEROSCOPIC;  Surgeon: Sophy Milligan MD;  Location: [facility] OR;  Service: OB/GYN;;  PAP SMEAR    HYSTEROSCOPY WITH DILATION AND CURETTAGE OF UTERUS N/A 2023    Procedure: HYSTEROSCOPY, WITH DILATION AND CURETTAGE;  Surgeon: Sophy Milligan MD;  Location: [facility] OR;  Service: OB/GYN;  Laterality: N/A;  with myosure    LIPOSUCTION OF ABDOMEN      TONSILLECTOMY      XI ROBOTIC HYSTERECTOMY, WITH SALPINGECTOMY N/A 10/25/2023    Procedure: XI ROBOTIC HYSTERECTOMY,WITH SALPINGECTOMY;  Surgeon: Sophy Milligan MD;  Location: [facility] OR;  Service: OB/GYN;  Laterality: N/A;       Social History  Ms.  reports that she has never smoked. She has never been exposed to tobacco smoke. She has never used smokeless tobacco. She reports that she does not drink alcohol and does not use drugs.    Family History  Ms.'s family history includes Asthma in her mother; Cancer in her maternal grandfather and mother; Diabetes in her father; Hypertension in her maternal grandmother and mother.    Medications and Allergies     Medications  No outpatient medications have been marked as taking for  the 12/5/23 encounter (Office Visit) with Jamari Stout MD.       Allergies  Review of patient's allergies indicates:   Allergen Reactions    Opioids - morphine analogues Nausea And Vomiting       Physical Examination     Vitals:    12/05/23 1359   BP: (!) 158/88   Pulse: 66   Resp: 16     Physical Exam  Constitutional:       General: She is not in acute distress.     Appearance: Normal appearance.   HENT:      Head: Normocephalic.      Right Ear: Tympanic membrane and ear canal normal.      Left Ear: Tympanic membrane and ear canal normal.      Nose: Nose normal.      Mouth/Throat:      Mouth: Mucous membranes are moist.      Pharynx: No oropharyngeal exudate.   Eyes:      Extraocular Movements: Extraocular movements intact.      Pupils: Pupils are equal, round, and reactive to light.   Cardiovascular:      Rate and Rhythm: Normal rate and regular rhythm.      Heart sounds: No murmur heard.  Pulmonary:      Effort: Pulmonary effort is normal.      Breath sounds: Normal breath sounds. No wheezing.   Abdominal:      General: Abdomen is flat. Bowel sounds are normal.      Palpations: Abdomen is soft.      Hernia: No hernia is present.   Musculoskeletal:         General: Normal range of motion.      Cervical back: Normal range of motion and neck supple.      Right lower leg: No edema.      Left lower leg: No edema.   Lymphadenopathy:      Cervical: No cervical adenopathy.   Skin:     General: Skin is warm and dry.      Coloration: Skin is not jaundiced.      Findings: No lesion.   Neurological:      General: No focal deficit present.      Mental Status: She is alert and oriented to person, place, and time.      Cranial Nerves: No cranial nerve deficit.      Gait: Gait normal.   Psychiatric:         Mood and Affect: Mood normal.         Behavior: Behavior normal.         Judgment: Judgment normal.          Assessment and Plan (including Health Maintenance)      Problem List  Smart Sets  Document Outside HM    :    Plan:           Health Maintenance Due   Topic Date Due    Hepatitis C Screening  Never done    HIV Screening  Never done    TETANUS VACCINE  Never done    Hemoglobin A1c (Diabetic Prevention Screening)  Never done    Influenza Vaccine (1) Never done       1. Hyperglycemia  -     Hemoglobin A1C; Future; Expected date: 12/05/2023    2. Hypokalemia  -     Basic Metabolic Panel; Future; Expected date: 12/05/2023    3. Mild intermittent asthma without complication  -     albuterol (VENTOLIN HFA) 90 mcg/actuation inhaler; Inhale 2 puffs into the lungs every 6 (six) hours as needed for Wheezing. Rescue  Dispense: 18 g; Refill: 0         Health Maintenance Topics with due status: Not Due       Topic Last Completion Date    Mammogram 09/28/2023       Future Appointments   Date Time Provider Department Center   12/26/2023  2:30 PM [facility] US1 [facility]   12/26/2023  2:45 PM Sophy Milligan MD [facility]   1/30/2024  3:00 PM Matheus Matson MD [facility]   10/3/2024  1:00 PM [facility] [place]        There are no Patient Instructions on file for this visit.  Follow up if symptoms worsen or fail to improve.     Signature:  Jamari Stout MD      Date of encounter: 12/5/23

## 2023-12-06 ENCOUNTER — TELEPHONE (OUTPATIENT)
Dept: FAMILY MEDICINE | Facility: CLINIC | Age: 43
End: 2023-12-06
Payer: OTHER GOVERNMENT

## 2023-12-06 DIAGNOSIS — E87.6 HYPOKALEMIA: Primary | ICD-10-CM

## 2023-12-06 RX ORDER — POTASSIUM CHLORIDE 750 MG/1
10 CAPSULE, EXTENDED RELEASE ORAL DAILY
Qty: 90 CAPSULE | Refills: 3 | Status: SHIPPED | OUTPATIENT
Start: 2023-12-06

## 2023-12-06 NOTE — TELEPHONE ENCOUNTER
Phone follow-up.  I did inform her potassium is low and will send in a prescription for potassium 10 mEq capsules daily.  Also she does have prediabetes we discussed diet and exercise will repeat this in 3 months.

## 2023-12-18 ENCOUNTER — OFFICE VISIT (OUTPATIENT)
Dept: FAMILY MEDICINE | Facility: CLINIC | Age: 43
End: 2023-12-18
Payer: OTHER GOVERNMENT

## 2023-12-18 VITALS
WEIGHT: 270 LBS | HEIGHT: 68 IN | HEART RATE: 93 BPM | DIASTOLIC BLOOD PRESSURE: 96 MMHG | OXYGEN SATURATION: 97 % | TEMPERATURE: 99 F | SYSTOLIC BLOOD PRESSURE: 133 MMHG | RESPIRATION RATE: 20 BRPM | BODY MASS INDEX: 40.92 KG/M2

## 2023-12-18 DIAGNOSIS — J01.00 ACUTE NON-RECURRENT MAXILLARY SINUSITIS: Primary | ICD-10-CM

## 2023-12-18 PROCEDURE — 99213 PR OFFICE/OUTPT VISIT, EST, LEVL III, 20-29 MIN: ICD-10-PCS | Mod: ,,, | Performed by: FAMILY MEDICINE

## 2023-12-18 PROCEDURE — 99213 OFFICE O/P EST LOW 20 MIN: CPT | Mod: ,,, | Performed by: FAMILY MEDICINE

## 2023-12-18 RX ORDER — AMOXICILLIN AND CLAVULANATE POTASSIUM 875; 125 MG/1; MG/1
1 TABLET, FILM COATED ORAL EVERY 12 HOURS
Qty: 20 TABLET | Refills: 0 | Status: SHIPPED | OUTPATIENT
Start: 2023-12-18

## 2023-12-18 RX ORDER — PREDNISONE 20 MG/1
TABLET ORAL
Qty: 9 TABLET | Refills: 0 | Status: SHIPPED | OUTPATIENT
Start: 2023-12-18

## 2023-12-18 NOTE — PROGRESS NOTES
Jamari Stout MD        PATIENT NAME: Kaylene Pryor  : 1980  DATE: 23  MRN: [id number]      Billing Provider: Jamari Stout MD  Level of Service: [place] OFFICE/OUTPT VISIT, EST, LEVL III, 20-29 MIN  Patient PCP Information       Provider PCP Type    Jamari Stout MD General            Reason for Visit / Chief Complaint: URI (Patient states she had covid x 2 weeks ago and was getting better but now she is getting sick again, bad cough, thinks it is bronchitis )       Update PCP  Update Chief Complaint         History of Present Illness / Problem Focused Workflow     Kaylene Pryor presents to the clinic with URI (Patient states she had covid x 2 weeks ago and was getting better but now she is getting sick again, bad cough, thinks it is bronchitis )     Pt had covid 10 days ago.  Now with cough and no fever.      URI   Associated symptoms include congestion and coughing. Pertinent negatives include no abdominal pain, chest pain, diarrhea, headaches, nausea, neck pain, rash, rhinorrhea, sinus pain, sore throat or wheezing.       Review of Systems     Review of Systems   Constitutional:  Negative for activity change, appetite change, fever and unexpected weight change.   HENT:  Positive for congestion. Negative for rhinorrhea, sinus pressure, sinus pain, sore throat and trouble swallowing.    Eyes:  Negative for photophobia, pain, discharge and visual disturbance.   Respiratory:  Positive for cough. Negative for chest tightness, wheezing and stridor.    Cardiovascular:  Negative for chest pain, palpitations and leg swelling.   Gastrointestinal:  Negative for abdominal pain, blood in stool, constipation, diarrhea and nausea.   Endocrine: Negative for polydipsia, polyphagia and polyuria.   Genitourinary:  Negative for difficulty urinating, flank pain and hematuria.   Musculoskeletal:  Negative for arthralgias and neck pain.   Skin:  Negative for rash.   Allergic/Immunologic: Negative for food allergies.    Neurological:  Negative for dizziness, tremors, seizures, syncope, weakness (global weakness) and headaches.   Psychiatric/Behavioral:  Negative for behavioral problems, confusion, decreased concentration, dysphoric mood and hallucinations. The patient is not nervous/anxious.         Medical / Social / Family History     Past Medical History:   Diagnosis Date    Abnormal uterine bleeding  period. Been bleeding since     Fibroid     Told at ER i have a medium size one    Hypertension 2023    Just started meds    Obesity (BMI 35.0-39.9 without comorbidity)        Past Surgical History:   Procedure Laterality Date    BLADDER REPAIR N/A 10/25/2023    Procedure: REPAIR, BLADDER;  Surgeon: Sophy Milligan MD;  Location: [facility] OR;  Service: OB/GYN;  Laterality: N/A;     SECTION      CHOLECYSTECTOMY      COLPOSCOPY  Not sure    COSMETIC SURGERY   and     abdominoplasty    CYSTOSCOPY N/A 10/25/2023    Procedure: CYSTOSCOPY;  Surgeon: Sophy Milligan MD;  Location: [facility] OR;  Service: OB/GYN;  Laterality: N/A;    HYSTEROSCOPIC POLYPECTOMY OF UTERUS  2023    Procedure: POLYPECTOMY, UTERUS, HYSTEROSCOPIC;  Surgeon: Sophy Milligan MD;  Location: [facility] OR;  Service: OB/GYN;;  PAP SMEAR    HYSTEROSCOPY WITH DILATION AND CURETTAGE OF UTERUS N/A 2023    Procedure: HYSTEROSCOPY, WITH DILATION AND CURETTAGE;  Surgeon: Sophy Milligan MD;  Location: [facility] OR;  Service: OB/GYN;  Laterality: N/A;  with myosure    LIPOSUCTION OF ABDOMEN      TONSILLECTOMY      XI ROBOTIC HYSTERECTOMY, WITH SALPINGECTOMY N/A 10/25/2023    Procedure: XI ROBOTIC HYSTERECTOMY,WITH SALPINGECTOMY;  Surgeon: Sophy Milligan MD;  Location: [facility] OR;  Service: OB/GYN;  Laterality: N/A;       Social History  Ms.  reports that she has never smoked. She has never been exposed to tobacco smoke. She has never used smokeless tobacco. She reports that she does not drink  alcohol and does not use drugs.    Family History  Ms.'s family history includes Asthma in her mother; Cancer in her maternal grandfather and mother; Diabetes in her father; Hypertension in her maternal grandmother and mother.    Medications and Allergies     Medications  No outpatient medications have been marked as taking for the 12/18/23 encounter (Office Visit) with Jamari Stout MD.       Allergies  Review of patient's allergies indicates:   Allergen Reactions    Opioids - morphine analogues Nausea And Vomiting       Physical Examination     Vitals:    12/18/23 0958   BP: (!) 133/96   Pulse: 93   Resp: 20   Temp: 98.6 °F (37 °C)     Physical Exam  Constitutional:       General: She is not in acute distress.     Appearance: Normal appearance.   HENT:      Head: Normocephalic.      Right Ear: Tympanic membrane and ear canal normal.      Left Ear: Tympanic membrane and ear canal normal.      Nose: Congestion present.      Mouth/Throat:      Mouth: Mucous membranes are moist.      Pharynx: No oropharyngeal exudate.   Eyes:      Extraocular Movements: Extraocular movements intact.      Pupils: Pupils are equal, round, and reactive to light.   Cardiovascular:      Rate and Rhythm: Normal rate and regular rhythm.      Heart sounds: No murmur heard.  Pulmonary:      Effort: Pulmonary effort is normal.      Breath sounds: Normal breath sounds. No wheezing.   Abdominal:      General: Abdomen is flat. Bowel sounds are normal.      Palpations: Abdomen is soft.      Hernia: No hernia is present.   Musculoskeletal:         General: Normal range of motion.      Cervical back: Normal range of motion and neck supple.      Right lower leg: No edema.      Left lower leg: No edema.   Lymphadenopathy:      Cervical: No cervical adenopathy.   Skin:     General: Skin is warm and dry.      Coloration: Skin is not jaundiced.      Findings: No lesion.   Neurological:      General: No focal deficit present.      Mental Status: She is  alert and oriented to person, place, and time.      Cranial Nerves: No cranial nerve deficit.      Gait: Gait normal.   Psychiatric:         Mood and Affect: Mood normal.         Behavior: Behavior normal.         Judgment: Judgment normal.          Assessment and Plan (including Health Maintenance)      Problem List  Smart Sets  Document Outside HM   :    Plan:           Health Maintenance Due   Topic Date Due    Hepatitis C Screening  Never done    HIV Screening  Never done    TETANUS VACCINE  Never done    Influenza Vaccine (1) Never done    Colorectal Cancer Screening  Never done       1. Acute non-recurrent maxillary sinusitis  -     amoxicillin-clavulanate 875-125mg (AUGMENTIN) 875-125 mg per tablet; Take 1 tablet by mouth every 12 (twelve) hours.  Dispense: 20 tablet; Refill: 0  -     predniSONE (DELTASONE) 20 MG tablet; 2 tabs po q AM x 3 days; 1 tab po q AM x 3 days  Dispense: 9 tablet; Refill: 0         Health Maintenance Topics with due status: Not Due       Topic Last Completion Date    Lipid Panel 07/11/2023    Mammogram 09/28/2023    Hemoglobin A1c (Diabetic Prevention Screening) 12/05/2023       Future Appointments   Date Time Provider Department Center   12/26/2023  2:30 PM [facility]   12/26/2023  2:45 PM Sophy Milligan MD [facility]   1/30/2024  3:00 PM Matheus Matson MD [facility]   10/3/2024  1:00 PM [facility] [place]        There are no Patient Instructions on file for this visit.  Follow up if symptoms worsen or fail to improve.     Signature:  Jamari Stout MD      Date of encounter: 12/18/23

## 2023-12-21 ENCOUNTER — PATIENT MESSAGE (OUTPATIENT)
Dept: FAMILY MEDICINE | Facility: CLINIC | Age: 43
End: 2023-12-21
Payer: OTHER GOVERNMENT

## 2023-12-26 ENCOUNTER — HOSPITAL ENCOUNTER (OUTPATIENT)
Dept: RADIOLOGY | Facility: HOSPITAL | Age: 43
Discharge: HOME | End: 2023-12-26
Attending: OBSTETRICS & GYNECOLOGY
Payer: OTHER GOVERNMENT

## 2023-12-26 ENCOUNTER — TELEPHONE (OUTPATIENT)
Dept: OBSTETRICS AND GYNECOLOGY | Facility: CLINIC | Age: 43
End: 2023-12-26
Payer: OTHER GOVERNMENT

## 2023-12-26 VITALS
WEIGHT: 267 LBS | HEIGHT: 68 IN | BODY MASS INDEX: 40.47 KG/M2 | SYSTOLIC BLOOD PRESSURE: 144 MMHG | RESPIRATION RATE: 18 BRPM | HEART RATE: 100 BPM | DIASTOLIC BLOOD PRESSURE: 102 MMHG

## 2023-12-26 DIAGNOSIS — N93.9 VAGINAL SPOTTING: ICD-10-CM

## 2023-12-26 DIAGNOSIS — R10.2 VAGINAL PAIN: ICD-10-CM

## 2023-12-26 DIAGNOSIS — Z48.89 ENCOUNTER FOR POSTOPERATIVE CARE: Primary | ICD-10-CM

## 2023-12-26 PROCEDURE — 99024 POSTOP FOLLOW-UP VISIT: CPT | Mod: ,,, | Performed by: OBSTETRICS & GYNECOLOGY

## 2023-12-26 PROCEDURE — 76856 US PELVIS COMPLETE NON OB: ICD-10-PCS | Mod: 26,,, | Performed by: STUDENT IN AN ORGANIZED HEALTH CARE EDUCATION/TRAINING PROGRAM

## 2023-12-26 PROCEDURE — 76856 US EXAM PELVIC COMPLETE: CPT | Mod: 26,,, | Performed by: STUDENT IN AN ORGANIZED HEALTH CARE EDUCATION/TRAINING PROGRAM

## 2023-12-26 PROCEDURE — 99214 OFFICE O/P EST MOD 30 MIN: CPT | Mod: PBBFAC,25 | Performed by: OBSTETRICS & GYNECOLOGY

## 2023-12-26 PROCEDURE — 76856 US EXAM PELVIC COMPLETE: CPT | Mod: TC

## 2023-12-26 NOTE — TELEPHONE ENCOUNTER
----- Message from Karma Oleary sent at 12/26/2023  3:58 PM CST -----  Regarding: PRESCRIPTION  PATIENT SAYS HER PRESCRIPTION HAS NOT BEEN CALLED IN TO PHARMACY. CALL BACK NUMBER IS [phone].

## 2023-12-27 ENCOUNTER — TELEPHONE (OUTPATIENT)
Dept: FAMILY MEDICINE | Facility: CLINIC | Age: 43
End: 2023-12-27
Payer: OTHER GOVERNMENT

## 2023-12-27 ENCOUNTER — PATIENT MESSAGE (OUTPATIENT)
Dept: OBSTETRICS AND GYNECOLOGY | Facility: CLINIC | Age: 43
End: 2023-12-27
Payer: OTHER GOVERNMENT

## 2023-12-27 DIAGNOSIS — B37.31 CANDIDA VAGINITIS: ICD-10-CM

## 2023-12-27 DIAGNOSIS — J01.00 ACUTE NON-RECURRENT MAXILLARY SINUSITIS: Primary | ICD-10-CM

## 2023-12-27 RX ORDER — FLUCONAZOLE 150 MG/1
150 TABLET ORAL
Qty: 2 TABLET | Refills: 0 | Status: SHIPPED | OUTPATIENT
Start: 2023-12-27 | End: 2023-12-31

## 2023-12-27 RX ORDER — METHYLPREDNISOLONE 4 MG/1
TABLET ORAL
Qty: 21 EACH | Refills: 0 | Status: SHIPPED | OUTPATIENT
Start: 2023-12-27

## 2023-12-27 RX ORDER — LEVOFLOXACIN 500 MG/1
500 TABLET, FILM COATED ORAL DAILY
Qty: 10 TABLET | Refills: 0 | Status: SHIPPED | OUTPATIENT
Start: 2023-12-27

## 2023-12-27 NOTE — TELEPHONE ENCOUNTER
Patient has been on antibiotics x 10 days and not feeling better, still has a bad cough, Please advise.

## 2023-12-27 NOTE — TELEPHONE ENCOUNTER
Phone follow-up.  Patient still has some wheezing.  She has no fever.  I did phone in a Medrol Dosepak and Levaquin.  She is to contact us in 48 hours if no better and follow up in 1 week if no better

## 2024-01-05 ENCOUNTER — OFFICE VISIT (OUTPATIENT)
Dept: FAMILY MEDICINE | Facility: CLINIC | Age: 44
End: 2024-01-05
Payer: OTHER GOVERNMENT

## 2024-01-05 VITALS
SYSTOLIC BLOOD PRESSURE: 136 MMHG | HEIGHT: 68 IN | RESPIRATION RATE: 16 BRPM | OXYGEN SATURATION: 99 % | DIASTOLIC BLOOD PRESSURE: 94 MMHG | TEMPERATURE: 98 F | HEART RATE: 98 BPM | BODY MASS INDEX: 41.07 KG/M2 | WEIGHT: 271 LBS

## 2024-01-05 DIAGNOSIS — R05.9 COUGH, UNSPECIFIED TYPE: ICD-10-CM

## 2024-01-05 DIAGNOSIS — I10 PRIMARY HYPERTENSION: ICD-10-CM

## 2024-01-05 DIAGNOSIS — J45.21 MILD INTERMITTENT ASTHMA WITH ACUTE EXACERBATION: Primary | ICD-10-CM

## 2024-01-05 PROCEDURE — 99213 OFFICE O/P EST LOW 20 MIN: CPT | Mod: ,,, | Performed by: FAMILY MEDICINE

## 2024-01-05 RX ORDER — FLUTICASONE PROPIONATE AND SALMETEROL 250; 50 UG/1; UG/1
1 POWDER RESPIRATORY (INHALATION) 2 TIMES DAILY
Qty: 60 EACH | Refills: 11 | Status: SHIPPED | OUTPATIENT
Start: 2024-01-05 | End: 2025-01-04

## 2024-01-05 RX ORDER — AMLODIPINE BESYLATE 5 MG/1
5 TABLET ORAL DAILY
Qty: 90 TABLET | Refills: 3 | Status: SHIPPED | OUTPATIENT
Start: 2024-01-05 | End: 2025-01-04

## 2024-01-05 NOTE — PROGRESS NOTES
Jamari Stout MD        PATIENT NAME: Kaylene Pryor  : 1980  DATE: 24  MRN: [id number]      Billing Provider: Jamari Stout MD  Level of Service: [place] OFFICE/OUTPT VISIT, EST, LEVL III, 20-29 MIN  Patient PCP Information       Provider PCP Type    Jamari Stout MD General            Reason for Visit / Chief Complaint: Cough       Update PCP  Update Chief Complaint         History of Present Illness / Problem Focused Workflow     Kaylene Pryor presents to the clinic with Cough     Cough continues.  Some better at night.    Cough  This is a recurrent problem. The current episode started 1 to 4 weeks ago. The problem has been waxing and waning. The problem occurs every few minutes. The cough is Non-productive. Associated symptoms include ear congestion, headaches, heartburn, myalgias, nasal congestion, rhinorrhea and wheezing. Pertinent negatives include no chest pain, chills, ear pain, fever, hemoptysis, postnasal drip, rash, sore throat, shortness of breath, sweats or weight loss. She has tried a beta-agonist inhaler and oral steroids for the symptoms. Her past medical history is significant for asthma, bronchitis, environmental allergies and pneumonia. There is no history of bronchiectasis, COPD or emphysema.       Review of Systems     Review of Systems   Constitutional:  Negative for activity change, appetite change, chills, fever, unexpected weight change and weight loss.   HENT:  Positive for rhinorrhea. Negative for congestion, ear pain, postnasal drip, sinus pressure, sinus pain, sore throat and trouble swallowing.    Eyes:  Negative for photophobia, pain, discharge and visual disturbance.   Respiratory:  Positive for cough and wheezing. Negative for hemoptysis, chest tightness, shortness of breath and stridor.    Cardiovascular:  Negative for chest pain, palpitations and leg swelling.   Gastrointestinal:  Positive for heartburn. Negative for abdominal pain, blood in stool, constipation,  diarrhea and nausea.   Endocrine: Negative for polydipsia, polyphagia and polyuria.   Genitourinary:  Negative for difficulty urinating, flank pain and hematuria.   Musculoskeletal:  Positive for myalgias. Negative for arthralgias and neck pain.   Skin:  Negative for rash.   Allergic/Immunologic: Positive for environmental allergies. Negative for food allergies.   Neurological:  Positive for headaches. Negative for dizziness, tremors, seizures, syncope and weakness (global weakness).   Psychiatric/Behavioral:  Negative for behavioral problems, confusion, decreased concentration, dysphoric mood and hallucinations. The patient is not nervous/anxious.         Medical / Social / Family History     Past Medical History:   Diagnosis Date    Abnormal uterine bleeding  period. Been bleeding since     Fibroid     Told at ER i have a medium size one    Hypertension 2023    Just started meds    Obesity (BMI 35.0-39.9 without comorbidity)        Past Surgical History:   Procedure Laterality Date    BLADDER REPAIR N/A 10/25/2023    Procedure: REPAIR, BLADDER;  Surgeon: Sophy Milligan MD;  Location: [facility];  Service: OB/GYN;  Laterality: N/A;     SECTION      CHOLECYSTECTOMY      COLPOSCOPY  Not sure    COSMETIC SURGERY   and     abdominoplasty    CYSTOSCOPY N/A 10/25/2023    Procedure: CYSTOSCOPY;  Surgeon: Sophy Milligan MD;  Location: [facility] OR;  Service: OB/GYN;  Laterality: N/A;    HYSTEROSCOPIC POLYPECTOMY OF UTERUS  2023    Procedure: POLYPECTOMY, UTERUS, HYSTEROSCOPIC;  Surgeon: Sophy Milligan MD;  Location: [facility] OR;  Service: OB/GYN;;  PAP SMEAR    HYSTEROSCOPY WITH DILATION AND CURETTAGE OF UTERUS N/A 2023    Procedure: HYSTEROSCOPY, WITH DILATION AND CURETTAGE;  Surgeon: Sophy Milligan MD;  Location: [facility] OR;  Service: OB/GYN;  Laterality: N/A;  with myosure    LIPOSUCTION OF ABDOMEN      TONSILLECTOMY      XI ROBOTIC  HYSTERECTOMY, WITH SALPINGECTOMY N/A 10/25/2023    Procedure: XI ROBOTIC HYSTERECTOMY,WITH SALPINGECTOMY;  Surgeon: Sophy Milligan MD;  Location: [facility];  Service: OB/GYN;  Laterality: N/A;       Social History  Ms.  reports that she has never smoked. She has never been exposed to tobacco smoke. She has never used smokeless tobacco. She reports that she does not drink alcohol and does not use drugs.    Family History  Ms.'s family history includes Asthma in her mother; Cancer in her maternal grandfather and mother; Diabetes in her father; Hypertension in her maternal grandmother and mother.    Medications and Allergies     Medications  No outpatient medications have been marked as taking for the 1/5/24 encounter (Office Visit) with Jamari Stout MD.       Allergies  Review of patient's allergies indicates:   Allergen Reactions    Opioids - morphine analogues Nausea And Vomiting       Physical Examination     Vitals:    01/05/24 1057   BP: (!) 136/94   Pulse: 98   Resp: 16   Temp: 98.3 °F (36.8 °C)     Physical Exam  Constitutional:       General: She is not in acute distress.     Appearance: Normal appearance.   HENT:      Head: Normocephalic.      Right Ear: Tympanic membrane and ear canal normal.      Left Ear: Tympanic membrane and ear canal normal.      Nose: Nose normal.      Mouth/Throat:      Mouth: Mucous membranes are moist.      Pharynx: No oropharyngeal exudate.   Eyes:      Extraocular Movements: Extraocular movements intact.      Pupils: Pupils are equal, round, and reactive to light.   Cardiovascular:      Rate and Rhythm: Normal rate and regular rhythm.      Heart sounds: No murmur heard.  Pulmonary:      Effort: Pulmonary effort is normal.      Breath sounds: Normal breath sounds. No wheezing.   Abdominal:      General: Abdomen is flat. Bowel sounds are normal.      Palpations: Abdomen is soft.      Hernia: No hernia is present.   Musculoskeletal:         General: Normal range of motion.       Cervical back: Normal range of motion and neck supple.      Right lower leg: No edema.      Left lower leg: No edema.   Lymphadenopathy:      Cervical: No cervical adenopathy.   Skin:     General: Skin is warm and dry.      Coloration: Skin is not jaundiced.      Findings: No lesion.   Neurological:      General: No focal deficit present.      Mental Status: She is alert and oriented to person, place, and time.      Cranial Nerves: No cranial nerve deficit.      Gait: Gait normal.   Psychiatric:         Mood and Affect: Mood normal.         Behavior: Behavior normal.         Judgment: Judgment normal.          Assessment and Plan (including Health Maintenance)      Problem List  Smart Sets  Document Outside HM   :    Plan:           Health Maintenance Due   Topic Date Due    Hepatitis C Screening  Never done    HIV Screening  Never done    TETANUS VACCINE  Never done    Influenza Vaccine (1) Never done    Colorectal Cancer Screening  Never done       1. Mild intermittent asthma with acute exacerbation  -     fluticasone-salmeterol diskus inhaler 250-50 mcg; Inhale 1 puff into the lungs 2 (two) times daily. Controller  Dispense: 60 each; Refill: 11    2. Cough, unspecified type  -     X-Ray Chest PA And Lateral; Future; Expected date: 01/05/2024  -     X-Ray Sinuses 1 view Mo; Future; Expected date: 01/05/2024    3. Primary hypertension  -     amLODIPine (NORVASC) 5 MG tablet; Take 1 tablet (5 mg total) by mouth once daily.  Dispense: 90 tablet; Refill: 3         Health Maintenance Topics with due status: Not Due       Topic Last Completion Date    Lipid Panel 07/11/2023    Mammogram 09/28/2023    Hemoglobin A1c (Diabetic Prevention Screening) 12/05/2023       Future Appointments   Date Time Provider Department Center   1/30/2024  3:00 PM Matheus Matson MD [facility]   10/3/2024  1:00 PM [facility] [place]        There are no Patient Instructions on file for this  visit.  Follow up if symptoms worsen or fail to improve.     Signature:  Jamari Stout MD      Date of encounter: 1/5/24

## 2024-01-05 NOTE — PROGRESS NOTES
"Return Gyn Office Visit    Assessment/Plan  Problem List Items Addressed This Visit    None  Visit Diagnoses       Encounter for postoperative care    -  Primary          TVUS is negative.   May increase activity as tolerated.  Continue pelvic rest until at least 10 weeks postop.  F/u with Dr. Matson as scheduled.     CC:   Chief Complaint   Patient presents with    Post-op Evaluation     Pt states her pain eased up a little bit. No pain today     HPI:  43 y.o. who presents to office for follow up on her postop pain. Her pain is much better. After her last visit, a pelvic US was ordered.     FINDINGS:  The uterus removed.     The right ovary measures 3 x 3 x 2 cm and the left ovary measures 3 x 2 x 2 cm. No worrisome adnexal mass is identified. No significant free fluid is demonstrated.  Normal doppler flow to the ovaries.  Left ovarian cyst now measures 2 cm, previously measuring up to 3 cm.     Impression:     The uterus has been removed.    Therefore, no seroma, hematoma, or abscess noted.   Her pain on urination has now resolved.   Denies any vaginal discharge or vaginal spotting or bleeding.   She is continuing to have urinary incontinence. She is unsure if she is going to follow up for the TOT surgery. She is anxious about complications again.    Review of Systems - The following ROS was otherwise negative, except as noted in the HPI:  constitutional, respiratory, cardiovascular, gastrointestinal, genitourinary    Objective:  BP (!) 144/102 Comment: Take BP medication at night, makes her dizzy  Pulse 100   Resp 18   Ht 5' 8" (1.727 m)   Wt 121.1 kg (267 lb)   BMI 40.60 kg/m²   General: Alert, well appearing, no acute distress  Abdomen: Soft, nontender, nondistended  Incision: c/d/I x 4.  Psych: normal mood and behavior.    "

## 2024-01-08 ENCOUNTER — PATIENT MESSAGE (OUTPATIENT)
Dept: FAMILY MEDICINE | Facility: CLINIC | Age: 44
End: 2024-01-08
Payer: OTHER GOVERNMENT

## 2024-01-09 DIAGNOSIS — I10 PRIMARY HYPERTENSION: Primary | ICD-10-CM

## 2024-01-09 RX ORDER — LISINOPRIL 5 MG/1
5 TABLET ORAL DAILY
Qty: 90 TABLET | Refills: 3 | Status: SHIPPED | OUTPATIENT
Start: 2024-01-09 | End: 2025-01-08

## 2024-01-12 ENCOUNTER — OFFICE VISIT (OUTPATIENT)
Dept: FAMILY MEDICINE | Facility: CLINIC | Age: 44
End: 2024-01-12
Payer: OTHER GOVERNMENT

## 2024-01-12 VITALS
BODY MASS INDEX: 41.32 KG/M2 | WEIGHT: 272.63 LBS | HEART RATE: 106 BPM | HEIGHT: 68 IN | SYSTOLIC BLOOD PRESSURE: 126 MMHG | TEMPERATURE: 99 F | DIASTOLIC BLOOD PRESSURE: 88 MMHG | OXYGEN SATURATION: 97 % | RESPIRATION RATE: 16 BRPM

## 2024-01-12 DIAGNOSIS — R53.83 FATIGUE, UNSPECIFIED TYPE: ICD-10-CM

## 2024-01-12 DIAGNOSIS — M79.10 MYALGIA: ICD-10-CM

## 2024-01-12 DIAGNOSIS — R73.09 ELEVATED RANDOM BLOOD GLUCOSE LEVEL: ICD-10-CM

## 2024-01-12 DIAGNOSIS — E55.9 VITAMIN D DEFICIENCY: ICD-10-CM

## 2024-01-12 DIAGNOSIS — R00.2 PALPITATIONS: Primary | ICD-10-CM

## 2024-01-12 LAB
25(OH)D3 SERPL-MCNC: 18.8 NG/ML
ALBUMIN SERPL BCP-MCNC: 3.4 G/DL (ref 3.5–5)
ALBUMIN/GLOB SERPL: 0.9 {RATIO}
ALP SERPL-CCNC: 78 U/L (ref 39–100)
ALT SERPL W P-5'-P-CCNC: 97 U/L (ref 13–56)
ANION GAP SERPL CALCULATED.3IONS-SCNC: 11 MMOL/L (ref 7–16)
AST SERPL W P-5'-P-CCNC: 71 U/L (ref 15–37)
BASOPHILS # BLD AUTO: 0.08 K/UL (ref 0–0.2)
BASOPHILS NFR BLD AUTO: 0.7 % (ref 0–1)
BILIRUB SERPL-MCNC: 0.7 MG/DL (ref ?–1.2)
BUN SERPL-MCNC: 8 MG/DL (ref 7–18)
BUN/CREAT SERPL: 9 (ref 6–20)
CALCIUM SERPL-MCNC: 9.1 MG/DL (ref 8.5–10.1)
CHLORIDE SERPL-SCNC: 103 MMOL/L (ref 98–107)
CK SERPL-CCNC: 163 U/L (ref 26–192)
CO2 SERPL-SCNC: 26 MMOL/L (ref 21–32)
CREAT SERPL-MCNC: 0.9 MG/DL (ref 0.55–1.02)
DIFFERENTIAL METHOD BLD: ABNORMAL
EGFR (NO RACE VARIABLE) (RUSH/TITUS): 81 ML/MIN/1.73M2
EOSINOPHIL # BLD AUTO: 0.66 K/UL (ref 0–0.5)
EOSINOPHIL NFR BLD AUTO: 5.8 % (ref 1–4)
ERYTHROCYTE [DISTWIDTH] IN BLOOD BY AUTOMATED COUNT: 16.2 % (ref 11.5–14.5)
EST. AVERAGE GLUCOSE BLD GHB EST-MCNC: 154 MG/DL
GLOBULIN SER-MCNC: 3.8 G/DL (ref 2–4)
GLUCOSE SERPL-MCNC: 302 MG/DL (ref 74–106)
HBA1C MFR BLD HPLC: 7 % (ref 4.5–6.6)
HCT VFR BLD AUTO: 40.1 % (ref 38–47)
HGB BLD-MCNC: 12.8 G/DL (ref 12–16)
IMM GRANULOCYTES # BLD AUTO: 0.04 K/UL (ref 0–0.04)
IMM GRANULOCYTES NFR BLD: 0.4 % (ref 0–0.4)
LYMPHOCYTES # BLD AUTO: 3.06 K/UL (ref 1–4.8)
LYMPHOCYTES NFR BLD AUTO: 27.1 % (ref 27–41)
MCH RBC QN AUTO: 26.9 PG (ref 27–31)
MCHC RBC AUTO-ENTMCNC: 31.9 G/DL (ref 32–36)
MCV RBC AUTO: 84.2 FL (ref 80–96)
MONOCYTES # BLD AUTO: 0.8 K/UL (ref 0–0.8)
MONOCYTES NFR BLD AUTO: 7.1 % (ref 2–6)
MPC BLD CALC-MCNC: 10.5 FL (ref 9.4–12.4)
NEUTROPHILS # BLD AUTO: 6.66 K/UL (ref 1.8–7.7)
NEUTROPHILS NFR BLD AUTO: 58.9 % (ref 53–65)
NRBC # BLD AUTO: 0 X10E3/UL
NRBC, AUTO (.00): 0 %
PLATELET # BLD AUTO: 314 K/UL (ref 150–400)
POTASSIUM SERPL-SCNC: 3.5 MMOL/L (ref 3.5–5.1)
PROT SERPL-MCNC: 7.2 G/DL (ref 6.4–8.2)
RBC # BLD AUTO: 4.76 M/UL (ref 4.2–5.4)
SODIUM SERPL-SCNC: 136 MMOL/L (ref 136–145)
T4 FREE SERPL-MCNC: 1.05 NG/DL (ref 0.76–1.46)
TSH SERPL DL<=0.005 MIU/L-ACNC: 2.26 UIU/ML (ref 0.36–3.74)
WBC # BLD AUTO: 11.3 K/UL (ref 4.5–11)

## 2024-01-12 PROCEDURE — 83036 HEMOGLOBIN GLYCOSYLATED A1C: CPT | Mod: ,,, | Performed by: CLINICAL MEDICAL LABORATORY

## 2024-01-12 PROCEDURE — 82550 ASSAY OF CK (CPK): CPT | Mod: ,,, | Performed by: CLINICAL MEDICAL LABORATORY

## 2024-01-12 PROCEDURE — 99214 OFFICE O/P EST MOD 30 MIN: CPT | Mod: ,,, | Performed by: FAMILY MEDICINE

## 2024-01-12 PROCEDURE — 82306 VITAMIN D 25 HYDROXY: CPT | Mod: ,,, | Performed by: CLINICAL MEDICAL LABORATORY

## 2024-01-12 PROCEDURE — 84439 ASSAY OF FREE THYROXINE: CPT | Mod: ,,, | Performed by: CLINICAL MEDICAL LABORATORY

## 2024-01-12 PROCEDURE — 80050 GENERAL HEALTH PANEL: CPT | Mod: ,,, | Performed by: CLINICAL MEDICAL LABORATORY

## 2024-01-12 NOTE — PROGRESS NOTES
Jamari Stout MD        PATIENT NAME: Kaylene Pryor  : 1980  DATE: 24  MRN: [id number]      Billing Provider: Jamari Stout MD  Level of Service: MD OFFICE/OUTPT VISIT, EST, LEVL IV, 30-39 MIN  Patient PCP Information       Provider PCP Type    Jamari Stout MD General            Reason for Visit / Chief Complaint: Dizziness and Muscle Pain (Muscle cramps thinks potassium low)       Update PCP  Update Chief Complaint         History of Present Illness / Problem Focused Workflow     Kaylene Pryor presents to the clinic with Dizziness and Muscle Pain (Muscle cramps thinks potassium low)     Cough some better.  Gets palpiations and muscle cramps.  Lot of fatigue    Dizziness:    Associated symptoms: headaches, weakness and palpitations.no hearing loss, no fever, no nausea, no vomiting and no chest pain.  Muscle Pain  Associated symptoms include headaches and weakness. Pertinent negatives include no abdominal pain, arthralgias, chest pain, congestion, coughing, fever, joint swelling, nausea, neck pain, rash, sore throat or vomiting.       Review of Systems     Review of Systems   Constitutional:  Negative for activity change, appetite change, fever and unexpected weight change.   HENT:  Negative for congestion, hearing loss, rhinorrhea, sinus pressure, sinus pain, sore throat and trouble swallowing.    Eyes:  Negative for photophobia, pain, discharge and visual disturbance.   Respiratory:  Negative for cough, chest tightness, wheezing and stridor.    Cardiovascular:  Positive for palpitations. Negative for chest pain and leg swelling.   Gastrointestinal:  Positive for diarrhea. Negative for abdominal pain, blood in stool, constipation, nausea and vomiting.   Endocrine: Negative for polydipsia, polyphagia and polyuria.   Genitourinary:  Negative for difficulty urinating, dysuria, flank pain, hematuria and menstrual problem.   Musculoskeletal:  Negative for arthralgias, joint swelling and neck pain.    Skin:  Negative for rash.   Allergic/Immunologic: Negative for food allergies.   Neurological:  Positive for dizziness, weakness and headaches. Negative for tremors, seizures and syncope.   Psychiatric/Behavioral:  Positive for confusion. Negative for behavioral problems, decreased concentration, dysphoric mood and hallucinations. The patient is not nervous/anxious.         Medical / Social / Family History     Past Medical History:   Diagnosis Date    Abnormal uterine bleeding 2023    Muss period. Been bleeding since     Fibroid     Told at ER i have a medium size one    Hypertension 2023    Just started meds    Obesity (BMI 35.0-39.9 without comorbidity)        Past Surgical History:   Procedure Laterality Date    BLADDER REPAIR N/A 10/25/2023    Procedure: REPAIR, BLADDER;  Surgeon: Sophy Milligan MD;  Location: [facility] OR;  Service: OB/GYN;  Laterality: N/A;     SECTION      CHOLECYSTECTOMY      COLPOSCOPY  Not sure    COSMETIC SURGERY   and     abdominoplasty    CYSTOSCOPY N/A 10/25/2023    Procedure: CYSTOSCOPY;  Surgeon: Sophy Milligan MD;  Location: [facility] OR;  Service: OB/GYN;  Laterality: N/A;    HYSTEROSCOPIC POLYPECTOMY OF UTERUS  2023    Procedure: POLYPECTOMY, UTERUS, HYSTEROSCOPIC;  Surgeon: Sophy Milligan MD;  Location: [facility] OR;  Service: OB/GYN;;  PAP SMEAR    HYSTEROSCOPY WITH DILATION AND CURETTAGE OF UTERUS N/A 2023    Procedure: HYSTEROSCOPY, WITH DILATION AND CURETTAGE;  Surgeon: Sophy Milligan MD;  Location: [facility] OR;  Service: OB/GYN;  Laterality: N/A;  with myosure    LIPOSUCTION OF ABDOMEN      TONSILLECTOMY      XI ROBOTIC HYSTERECTOMY, WITH SALPINGECTOMY N/A 10/25/2023    Procedure: XI ROBOTIC HYSTERECTOMY,WITH SALPINGECTOMY;  Surgeon: Sophy Milligan MD;  Location: [facility] OR;  Service: OB/GYN;  Laterality: N/A;       Social History  Ms.  reports that she has never smoked. She has never been exposed to  tobacco smoke. She has never used smokeless tobacco. She reports that she does not drink alcohol and does not use drugs.    Family History  Ms.'s family history includes Asthma in her mother; Cancer in her maternal grandfather and mother; Diabetes in her father; Hypertension in her maternal grandmother and mother.    Medications and Allergies     Medications  No outpatient medications have been marked as taking for the 1/12/24 encounter (Office Visit) with Jamari Stout MD.       Allergies  Review of patient's allergies indicates:   Allergen Reactions    Opioids - morphine analogues Nausea And Vomiting       Physical Examination     Vitals:    01/12/24 1106   BP: 126/88   Pulse: 106   Resp: 16   Temp: 98.5 °F (36.9 °C)     Physical Exam  Constitutional:       General: She is not in acute distress.     Appearance: Normal appearance.   HENT:      Head: Normocephalic.      Right Ear: Tympanic membrane and ear canal normal.      Left Ear: Tympanic membrane and ear canal normal.      Nose: Nose normal.      Mouth/Throat:      Mouth: Mucous membranes are moist.      Pharynx: No oropharyngeal exudate.   Eyes:      Extraocular Movements: Extraocular movements intact.      Pupils: Pupils are equal, round, and reactive to light.   Cardiovascular:      Rate and Rhythm: Normal rate and regular rhythm.      Heart sounds: No murmur heard.  Pulmonary:      Effort: Pulmonary effort is normal.      Breath sounds: Normal breath sounds. No wheezing.   Abdominal:      General: Abdomen is flat. Bowel sounds are normal.      Palpations: Abdomen is soft.      Hernia: No hernia is present.   Musculoskeletal:         General: Normal range of motion.      Cervical back: Normal range of motion and neck supple.      Right lower leg: No edema.      Left lower leg: No edema.   Lymphadenopathy:      Cervical: No cervical adenopathy.   Skin:     General: Skin is warm and dry.      Coloration: Skin is not jaundiced.      Findings: No lesion.    Neurological:      General: No focal deficit present.      Mental Status: She is alert and oriented to person, place, and time.      Cranial Nerves: No cranial nerve deficit.      Gait: Gait normal.   Psychiatric:         Mood and Affect: Mood normal.         Behavior: Behavior normal.         Judgment: Judgment normal.          Assessment and Plan (including Health Maintenance)      Problem List  Smart Sets  Document Outside HM   :    Plan:     1. Palpitations    -     Ambulatory referral/consult to Cardiology; Future; Expected date: 01/19/2024  -     CBC Auto Differential; Future; Expected date: 07/12/2024  -     Comprehensive Metabolic Panel; Future; Expected date: 07/12/2024  -     Thyroid Panel; Future; Expected date: 01/12/2024    2. Vitamin D deficiency    -     Vitamin D; Future; Expected date: 01/12/2024    3. Myalgia    -     CK; Future; Expected date: 01/12/2024          Health Maintenance Due   Topic Date Due    Hepatitis C Screening  Never done    Pneumococcal Vaccines (Age 0-64) (1 - PCV) Never done    HIV Screening  Never done    TETANUS VACCINE  Never done    Influenza Vaccine (1) Never done    Colorectal Cancer Screening  Never done       1. Palpitations  -     Ambulatory referral/consult to Cardiology; Future; Expected date: 01/19/2024  -     CBC Auto Differential; Future; Expected date: 07/12/2024  -     Comprehensive Metabolic Panel; Future; Expected date: 07/12/2024  -     Thyroid Panel; Future; Expected date: 01/12/2024    2. Vitamin D deficiency  -     Vitamin D; Future; Expected date: 01/12/2024    3. Myalgia  -     CK; Future; Expected date: 01/12/2024    4. Elevated random blood glucose level  -     Hemoglobin A1C    5. Fatigue, unspecified type         Health Maintenance Topics with due status: Not Due       Topic Last Completion Date    Lipid Panel 07/11/2023    Mammogram 09/28/2023    Hemoglobin A1c (Diabetic Prevention Screening) 12/05/2023       Future Appointments   Date Time  Provider Department Center   1/30/2024  3:00 PM Matheus Matson MD [facility]   10/3/2024  1:00 PM [facility] [place]        There are no Patient Instructions on file for this visit.  Follow up if symptoms worsen or fail to improve.     Signature:  Jamari Stout MD      Date of encounter: 1/12/24

## 2024-01-15 ENCOUNTER — TELEPHONE (OUTPATIENT)
Dept: FAMILY MEDICINE | Facility: CLINIC | Age: 44
End: 2024-01-15
Payer: OTHER GOVERNMENT

## 2024-01-15 NOTE — TELEPHONE ENCOUNTER
Discuss the patient's lab work with her.  She is to come in in the morning 8:00 a.m. for a fasting glucose hemoglobin A1c.  We will contact with the results and also use vitamin-D supplementation.

## 2024-01-16 DIAGNOSIS — E11.9 TYPE 2 DIABETES MELLITUS WITHOUT COMPLICATION, WITHOUT LONG-TERM CURRENT USE OF INSULIN: Primary | ICD-10-CM

## 2024-01-16 RX ORDER — METFORMIN HYDROCHLORIDE 500 MG/1
500 TABLET, EXTENDED RELEASE ORAL
Qty: 90 TABLET | Refills: 3 | Status: SHIPPED | OUTPATIENT
Start: 2024-01-16 | End: 2025-01-15

## 2024-01-22 DIAGNOSIS — E11.9 TYPE 2 DIABETES MELLITUS WITHOUT COMPLICATION, WITHOUT LONG-TERM CURRENT USE OF INSULIN: Primary | ICD-10-CM

## 2024-01-24 ENCOUNTER — OFFICE VISIT (OUTPATIENT)
Dept: CARDIOLOGY | Facility: CLINIC | Age: 44
End: 2024-01-24
Payer: OTHER GOVERNMENT

## 2024-01-24 VITALS
HEIGHT: 68 IN | OXYGEN SATURATION: 98 % | WEIGHT: 273 LBS | BODY MASS INDEX: 41.37 KG/M2 | HEART RATE: 113 BPM | SYSTOLIC BLOOD PRESSURE: 130 MMHG | DIASTOLIC BLOOD PRESSURE: 90 MMHG

## 2024-01-24 DIAGNOSIS — E66.9 OBESITY (BMI 35.0-39.9 WITHOUT COMORBIDITY): ICD-10-CM

## 2024-01-24 DIAGNOSIS — R00.2 PALPITATIONS: ICD-10-CM

## 2024-01-24 DIAGNOSIS — I10 PRIMARY HYPERTENSION: Primary | ICD-10-CM

## 2024-01-24 DIAGNOSIS — E11.9 DIABETES MELLITUS WITHOUT COMPLICATION: ICD-10-CM

## 2024-01-24 PROCEDURE — 99205 OFFICE O/P NEW HI 60 MIN: CPT | Mod: S$PBB,,, | Performed by: INTERNAL MEDICINE

## 2024-01-24 PROCEDURE — 93005 ELECTROCARDIOGRAM TRACING: CPT | Mod: PBBFAC | Performed by: INTERNAL MEDICINE

## 2024-01-24 PROCEDURE — 99214 OFFICE O/P EST MOD 30 MIN: CPT | Mod: PBBFAC,25 | Performed by: INTERNAL MEDICINE

## 2024-01-24 PROCEDURE — 93010 ELECTROCARDIOGRAM REPORT: CPT | Mod: S$PBB,,, | Performed by: INTERNAL MEDICINE

## 2024-01-24 NOTE — PROGRESS NOTES
PCP: Jamari Stout MD    Referring Provider:     Subjective:   Kaylene Pryor is a 43 y.o. female with hx of hypertension and recent diagnosis of diabetes who presents for evaluation of palpitations, come and go spontaneously, not associated with chest pain, pressure or shortenss of breath. She has had symptom on and off over ten years.  She was evaluated approximately ten years ago, told not to worry about about flutters.  She is a full time homemaker.  She recently undergone MARCO secondary to uterine fibroids, did not affect palpitations.   She reports shopping is the most active thing she does.          Fhx:   Family History   Problem Relation Age of Onset    Asthma Mother     Cancer Mother         Melanoma    Hypertension Mother     Diabetes Father     Cancer Maternal Grandfather         Melanoma    Hypertension Maternal Grandmother       Shx:   Past Surgical History:   Procedure Laterality Date    BLADDER REPAIR N/A 10/25/2023    Procedure: REPAIR, BLADDER;  Surgeon: Sophy Milligan MD;  Location: [facility] OR;  Service: OB/GYN;  Laterality: N/A;     SECTION      CHOLECYSTECTOMY      COLPOSCOPY  Not sure    COSMETIC SURGERY   and     abdominoplasty    CYSTOSCOPY N/A 10/25/2023    Procedure: CYSTOSCOPY;  Surgeon: Sophy Milligan MD;  Location: [facility] OR;  Service: OB/GYN;  Laterality: N/A;    HYSTEROSCOPIC POLYPECTOMY OF UTERUS  2023    Procedure: POLYPECTOMY, UTERUS, HYSTEROSCOPIC;  Surgeon: Sophy Milligan MD;  Location: [facility] OR;  Service: OB/GYN;;  PAP SMEAR    HYSTEROSCOPY WITH DILATION AND CURETTAGE OF UTERUS N/A 2023    Procedure: HYSTEROSCOPY, WITH DILATION AND CURETTAGE;  Surgeon: Sophy Milligan MD;  Location: [facility] OR;  Service: OB/GYN;  Laterality: N/A;  with myosure    LIPOSUCTION OF ABDOMEN      TONSILLECTOMY      XI ROBOTIC HYSTERECTOMY, WITH SALPINGECTOMY N/A 10/25/2023    Procedure: XI ROBOTIC HYSTERECTOMY,WITH SALPINGECTOMY;  Surgeon: Sophy Milligan MD;   Location: [facility];  Service: OB/GYN;  Laterality: N/A;        EKG - NSR, normal     ECHO - No results found for this or any previous visit.       CATH - No results found for this or any previous visit.       Stress - No results found for this or any previous visit.       Lab Results   Component Value Date     01/12/2024    K 3.5 01/12/2024     01/12/2024    CO2 26 01/12/2024    BUN 8 01/12/2024    CREATININE 0.90 01/12/2024    CALCIUM 9.1 01/12/2024    ANIONGAP 11 01/12/2024       Lab Results   Component Value Date    CHOL 157 07/11/2023     Lab Results   Component Value Date    HDL 38 (L) 07/11/2023     Lab Results   Component Value Date    LDLCALC 88 07/11/2023     Lab Results   Component Value Date    TRIG 157 (H) 07/11/2023     Lab Results   Component Value Date    CHOLHDL 4.1 07/11/2023       Lab Results   Component Value Date    WBC 11.30 (H) 01/12/2024    HGB 12.8 01/12/2024    HCT 40.1 01/12/2024    MCV 84.2 01/12/2024     01/12/2024           Current Outpatient Medications:     albuterol (VENTOLIN HFA) 90 mcg/actuation inhaler, Inhale 2 puffs into the lungs every 6 (six) hours as needed for Wheezing. Rescue, Disp: 18 g, Rfl: 0    amLODIPine (NORVASC) 5 MG tablet, Take 1 tablet (5 mg total) by mouth once daily., Disp: 90 tablet, Rfl: 3    fluticasone-salmeterol diskus inhaler 250-50 mcg, Inhale 1 puff into the lungs 2 (two) times daily. Controller, Disp: 60 each, Rfl: 11    lisinopriL (PRINIVIL,ZESTRIL) 5 MG tablet, Take 1 tablet (5 mg total) by mouth once daily., Disp: 90 tablet, Rfl: 3    metFORMIN (GLUCOPHAGE-XR) 500 MG ER 24hr tablet, Take 1 tablet (500 mg total) by mouth daily with breakfast., Disp: 90 tablet, Rfl: 3    potassium chloride (MICRO-K) 10 MEQ CpSR, Take 1 capsule (10 mEq total) by mouth once daily., Disp: 90 capsule, Rfl: 3    amoxicillin-clavulanate 875-125mg (AUGMENTIN) 875-125 mg per tablet, Take 1 tablet by mouth every 12 (twelve) hours. (Patient not taking:  Reported on 1/24/2024), Disp: 20 tablet, Rfl: 0    HYDROcodone-acetaminophen (NORCO) 5-325 mg per tablet, Take 1 tablet by mouth every 4 (four) hours as needed for Pain. (Patient not taking: Reported on 1/24/2024), Disp: 30 tablet, Rfl: 0    ibuprofen (ADVIL,MOTRIN) 800 MG tablet, Take 1 tablet (800 mg total) by mouth 3 (three) times daily as needed for Pain. (Patient not taking: Reported on 1/24/2024), Disp: 30 tablet, Rfl: 1    levoFLOXacin (LEVAQUIN) 500 MG tablet, Take 1 tablet (500 mg total) by mouth once daily. (Patient not taking: Reported on 1/24/2024), Disp: 10 tablet, Rfl: 0    LIDOcaine (XYLOCAINE) 5 % Oint ointment, Apply to urethra 4x/day prn pain/discomfort. (Patient not taking: Reported on 1/24/2024), Disp: 30 g, Rfl: 1    methylPREDNISolone (MEDROL DOSEPACK) 4 mg tablet, use as directed (Patient not taking: Reported on 1/24/2024), Disp: 21 each, Rfl: 0    predniSONE (DELTASONE) 20 MG tablet, 2 tabs po q AM x 3 days; 1 tab po q AM x 3 days (Patient not taking: Reported on 1/24/2024), Disp: 9 tablet, Rfl: 0    Review of Systems   Constitutional: Positive for weight gain. Negative for diaphoresis, malaise/fatigue and night sweats.        Wt up 20 lbs over last year   HENT:  Positive for congestion and hearing loss. Negative for ear pain, nosebleeds and sore throat.    Eyes:  Negative for blurred vision, double vision, pain, photophobia and visual disturbance.   Cardiovascular:  Positive for palpitations. Negative for chest pain, claudication, dyspnea on exertion, irregular heartbeat, leg swelling, near-syncope, orthopnea and syncope.   Respiratory:  Positive for shortness of breath. Negative for cough, sleep disturbances due to breathing, snoring and wheezing.         SOB, hx of asthma, on albuteral, does not seem to affect palpitations, does get jittery when uses [facility]I  BRIANNA: not compliant with CPAP   Endocrine: Positive for polydipsia and polyphagia. Negative for cold intolerance, heat intolerance and  "polyuria.        Monitoring bs,  to 165   Hematologic/Lymphatic: Negative for bleeding problem. Does not bruise/bleed easily.   Skin:  Negative for dry skin, flushing, itching, rash and skin cancer.   Musculoskeletal:  Negative for arthritis, back pain, falls, joint pain, muscle cramps, muscle weakness and myalgias.        Fibromyalgia   Gastrointestinal:  Positive for diarrhea. Negative for abdominal pain, change in bowel habit, constipation, dysphagia, heartburn, nausea and vomiting.   Genitourinary:  Negative for bladder incontinence, dysuria, flank pain, frequency and nocturia.   Neurological:  Positive for headaches. Negative for dizziness, focal weakness, light-headedness, loss of balance, numbness, paresthesias and seizures.   Psychiatric/Behavioral:  Negative for depression, memory loss and substance abuse. The patient is nervous/anxious.    Allergic/Immunologic: Negative for environmental allergies.        Objective:   BP (!) 130/90 (BP Location: Left arm, Patient Position: Sitting)   Pulse (!) 113   Ht 5' 8" (1.727 m)   Wt 123.8 kg (273 lb)   SpO2 98%   BMI 41.51 kg/m²       Physical Exam  Vitals and nursing note reviewed.   Constitutional:       Appearance: Normal appearance. She is obese.   HENT:      Head: Normocephalic and atraumatic.      Right Ear: External ear normal.      Left Ear: External ear normal.   Eyes:      General: No scleral icterus.        Right eye: No discharge.         Left eye: No discharge.      Extraocular Movements: Extraocular movements intact.      Conjunctiva/sclera: Conjunctivae normal.      Pupils: Pupils are equal, round, and reactive to light.   Cardiovascular:      Rate and Rhythm: Normal rate and regular rhythm.      Pulses: Normal pulses.      Heart sounds: Normal heart sounds. No murmur heard.     No friction rub. No gallop.   Pulmonary:      Effort: Pulmonary effort is normal.      Breath sounds: Normal breath sounds. No wheezing, rhonchi or rales.   Chest: "      Chest wall: No tenderness.   Abdominal:      General: Abdomen is flat. Bowel sounds are normal. There is no distension.      Palpations: Abdomen is soft.      Tenderness: There is no abdominal tenderness. There is no guarding or rebound.   Musculoskeletal:         General: No swelling or tenderness. Normal range of motion.      Cervical back: Normal range of motion and neck supple.   Skin:     General: Skin is warm and dry.      Findings: No erythema or rash.   Neurological:      General: No focal deficit present.      Mental Status: She is alert and oriented to person, place, and time.      Cranial Nerves: No cranial nerve deficit.      Motor: No weakness.      Gait: Gait normal.   Psychiatric:         Mood and Affect: Mood normal.         Behavior: Behavior normal.         Thought Content: Thought content normal.         Judgment: Judgment normal.     Assessment:     1. Primary hypertension        2. Palpitations  Ambulatory referral/consult to Cardiology    EKG 12-lead    EKG 12-lead      3. Obesity (BMI 35.0-39.9 without comorbidity)        4. Diabetes mellitus without complication              Plan:   Palpitations: unclear etiology, will place on Zio to monitor, obtain echo to eval for structural heart disease  Hypertension: controlled on current meds, will obtain larger cuff, monitor bp am and pm, review bp diary when available.   DM type 2 adult onset, not well controlled  Morbid obesity with 20 lb wt gain over last year  BRIANNA: not treated, pt refuses to wear CPAP, will refer to sleep medicine.   Fibromyalgia: on steroids.  7.   Uterine fibriods with recent MARCO  8.   Chronic diarrhea: refer to GI  9.   Heart murmur, consistent with MR, will await echo results.       Follow up to review results of tele, echo.

## 2024-01-25 ENCOUNTER — CLINICAL SUPPORT (OUTPATIENT)
Dept: DIABETES | Facility: CLINIC | Age: 44
End: 2024-01-25
Payer: OTHER GOVERNMENT

## 2024-01-25 VITALS — WEIGHT: 272.63 LBS | BODY MASS INDEX: 41.32 KG/M2 | HEIGHT: 68 IN

## 2024-01-25 DIAGNOSIS — E11.9 TYPE 2 DIABETES MELLITUS WITHOUT COMPLICATION, WITHOUT LONG-TERM CURRENT USE OF INSULIN: ICD-10-CM

## 2024-01-25 PROCEDURE — G0108 DIAB MANAGE TRN  PER INDIV: HCPCS | Mod: PBBFAC | Performed by: FAMILY MEDICINE

## 2024-01-25 PROCEDURE — 99999PBSHW PR PBB SHADOW TECHNICAL ONLY FILED TO HB: Mod: PBBFAC,,,

## 2024-01-25 PROCEDURE — 99214 OFFICE O/P EST MOD 30 MIN: CPT | Mod: PBBFAC

## 2024-01-25 NOTE — PROGRESS NOTES
"Diabetes Care Specialist Progress Note  Author: AVILA OCHOA RN  Date: 1/25/2024    Program Intake  Reason for Diabetes Program Visit:: Initial Diabetes Assessment (1st visit , New diagnosis)  Current diabetes risk level::  (New dx diabetes)  In the last 12 months, have you:: used emergency room services  Was the ER or hospital admission related to diabetes?: No (Diagnosed with Fibroids)  Permission to speak with others about care:: yes () He is present on visit today.    Lab Results   Component Value Date    HGBA1C 7.0 (H) 01/12/2024       42 y/o diagnosed with diabetes type 2 in December.  She also was diagnosed with Covid and started on a medrol dose pack. She denies any prior history of hyperglycemia.     CURRENT DIABETES MEDICATIONS:   Metformin (GLUCOPHAGE- XR) 500 MG ER 24hr tablet: Take 1 tablet (500 mg total) by mouth 1 (one) time daily     ACE or ARB:  Lisinopril  Take 1 tablet (5 mg total) by mouth once daily.     Statin:   None    Clinical    Weight: 123.7 kg (272 lb 9.6 oz)   Height: 5' 8" (172.7 cm)   Body mass index is 41.45 kg/m².    Patient Health Rating  Compared to other people your age, how would you rate your health?: Fair    Problem Review  Reviewed Problem List with Patient: yes  Active comorbidities affecting diabetes self-care.: no  Reviewed health maintenance: yes    Clinical Assessment  Current Diabetes Treatment: Oral Medication    Medication Information  How do you obtain your medications?: Patient drives  How many days a week do you miss your medications?: Never  Do you use a pill box or medication chart to help you manage your medications?: Other (see comment) (uses pill bottles)  Do you sometimes have difficulty refilling your medications?: No  Medication adherence impacting ability to self-manage diabetes?: No    Labs  Lab Compliance Barriers: No    Nutritional Status  Meal Plan 24 Hour Recall: Breakfast, Lunch, Dinner, Snack  Meal Plan 24 Hour Recall - Breakfast: skylar, " water  Meal Plan 24 Hour Recall - Lunch: barbecue sandwich with fries (cookout)  diet coke (Eats our 2-3 x week)  Meal Plan 24 Hour Recall - Dinner: 10 pizza rolls, diet drinks or water  Meal Plan 24 Hour Recall - Snack: slim jims, baked chips, popcorn  Dentation:: Intact  Current nutritional status an area of need that is impacting patient's ability to self-manage diabetes?: Yes    Additional Social History    Support  Does anyone support you with your diabetes care?: yes  Who supports you?: spouse  Who takes you to your medical appointments?: self  Does the current support meet the patient's needs?: Yes  Is Support an area impacting ability to self-manage diabetes?: No    Access to Mass Media & Technology  Does the patient have access to any of the following devices or technologies?: Smart phone, Home computer  Media or technology needs impacting ability to self-manage diabetes?: No    Cognitive/Behavioral Health  Alert and Oriented: Yes  Difficulty Thinking: No  Requires Prompting: No  Requires assistance for routine expression?: No  Cognitive or behavioral barriers impacting ability to self-manage diabetes?: No    Culture/Mormon  Culture or Episcopal beliefs that may impact ability to access healthcare: No    Communication  Language preference: English  Hearing Problems: No  Vision Problems: No  Communication needs impacting ability to self-manage diabetes?: No    Health Literacy  Preferred Learning Method: Face to Face, Reading Materials  How often do you need to have someone help you read instructions, pamphlets, or written material from your doctor or pharmacy?: Never  Health literacy needs impacting ability to self-manage diabetes?: No      Diabetes Self-Management Skills Assessment    Diabetes Disease Process/Treatment Options  Patient/caregiver able to state what happens when someone has diabetes.: no  Patient/caregiver knows what type of diabetes they have.: yes  Diabetes Type : Type  II  Patient/caregiver able to identify at least three signs and symptoms of diabetes.: no  Patient able to identify at least three risk factors for diabetes.: no  Diabetes Disease Process/Treatment Options: Skills Assessment Completed: Yes  Assessment indicates:: Knowledge deficit, Instruction Needed  Area of need?: Yes    Nutrition/Healthy Eating  Challenges to healthy eating:: portion control (Picky eater)  Method of carbohydrate measurement:: no knowledge of what carbs are  Nutrition/Healthy Eating Skills Assessment Completed:: Yes  Assessment indicates:: Knowledge deficit, Instruction Needed  Area of need?: Yes    Physical Activity/Exercise  Patient's daily activity level:: sedentary  Patient can identify reasons why exercise/physical activity is important in diabetes management.: no  Physical Activity/Exercise Skills Assessment Completed: : Yes  Assessment indicates:: Knowledge deficit, Instruction Needed  Area of need?: Yes    Medications  Patient is able to describe current diabetes management routine.: yes  Diabetes management routine:: oral medications  Patient is able to identify current diabetes medications, dosages, and appropriate timing of medications.: yes  Medication Skills Assessment Completed:: Yes  Assessment indicates:: Knowledge deficit, Instruction Needed  Area of need?: Yes    Home Blood Glucose Monitoring  Patient states that blood sugar is checked at home daily.: yes (Using a family members / She is using an sebastien on her phone)  How often do you check your blood sugar?: Twice a day  When do you check your blood sugar?: Before breakfast, 2 hours after meal  Blood glucose logs reviewed today?: yes  Home Blood Glucose Monitoring Skills Assessment Completed: : Yes  Assessment indicates:: Knowledge deficit, Instruction Needed  Area of need?: Yes    Acute Complications  Area of need?: Deferred (time)    Chronic Complications  Area of need?: Deferred (time)    Psychosocial/Coping  Patient can  "identify ways of coping with chronic disease.: yes  Patient-stated ways of coping with chronic disease:: support from loved ones  Psychosocial/Coping Skills Assessment Completed: : Yes  Assessment indicates:: Adequate understanding  Area of need?: No (Handout on Diabetes Distress provided)      Assessment Summary and Plan    Based on today's diabetes care assessment, the following areas of need were identified:          1/25/2024       Social   Support No   Access to Mass Media/Tech No   Cognitive/Behavioral Health No   Culture/Jew No   Communication No   Health Literacy No            1/25/2024       Clinical   Medication Adherence No   Lab Compliance No   Nutritional Status Yes            1/25/2024      Diabetes Self-Management Skills   Diabetes Disease Process/Treatment Options Yes - - Reviewed diabetes pathophysiology, different types of diabetes, signs and symptoms, risk factors, progression, and treatment guidelines. Emphasized on the importance of controlling diabetes to prevent complications and how diabetes can be successfully managed. Handout Provided on, Understanding Type 2 Diabetes, How to Find Out if You Have Diabetes and how to manage diabetes.     Nutrition/Healthy Eating Yes - See Care Plan     Physical Activity/Exercise Yes - See Care Plan     Medication Yes  - Provided review of current diabetes medication (Metformin)  action, dosage, frequency, side effects, and storage guidelines.  Reviewed the importance of meal and medication timing with diabetes mediations for prevention of acute complications and maximum drug benefit. Discussed contacting HCP if current medication regimen isn't working.  Handout Provided.     Home Blood Glucose Monitoring Yes - Discussed appropriate timing and frequency to SMBG, education on parameters on when to notify provider and advised patient to bring logs to all appts with PCP/Endocrinologist/Diabetes Care Specialist.  Handouts provided on "Checking your Blood " "Sugar, All About Blood Glucose."  Recommended she buy a [employer] Brand glucose meter kit over the counter from "[employer].  (The one she is using is borrowed.)      Acute Complications Deferred     Chronic Complications Deferred     Psychosocial/Coping No - Spouse is supportive.           Today's interventions were provided through individual discussion, instruction, and written materials were provided.      Patient verbalized understanding of instruction and written materials.  Pt was able to return back demonstration of instructions today. Patient understood key points, needs reinforcement and further instruction.     Diabetes Self-Management Care Plan:    Today's Diabetes Self-Management Care Plan was developed with Kaylene's input. Kaylene has agreed to work toward the following goal(s) to improve his/her overall diabetes control.      Care Plan: Diabetes Management        Problem: Healthy Eating         Goal: Eat 3 meals daily with 30-45g (2-3) servings of Carbohydrate per meal.    Start Date: 1/25/2024   Expected End Date: 2/28/2024   Barriers: No Barriers Identified   Note:    Reviewed the sources and role of Carbohydrate, Protein, and Fat and how each nutrient impact blood sugar. Provided meal-planning instruction via food lists, plate method, food models, food labels. Reviewed micro/macronutrient effect on health management. Discussed carbohydrate counting and spacing. Reviewed principles of energy metabolism to assist weight and health management. Discussed strategies for healthier dining out and replacing sugary beverages with water and other noncaloric, sugar free options.  Several handouts provided on Carbohydrate Counting, Health Healthy Carb Diet, Planning healthy meals.     Task: Provided visual examples using dry measuring cups, food models, and other familiar objects such as computer mouse, deck or cards, tennis ball etc. to help with visualization of portions. Completed 1/25/2024          Task: Review the " importance of balancing carbohydrates with each meal using portion control techniques to count servings of carbohydrate and label reading to identify serving size and amount of total carbs per serving. Completed 1/25/2024        Task: Provided Sample plate method and reviewed the use of the plate to estimate amounts of carbohydrate per meal. Completed 1/25/2024        Problem: Physical Activity and Exercise         Goal: Patient agrees to increase physical activity to a goal of 5 times per week for 30 minutes.    Start Date: 1/25/2024   Expected End Date: 2/28/2024   Barriers: Other (comments)   Note:    Had a Robotic hysterectomy on 10/25/2023. She is limited to 10 minutes twice a day until following up with HCP.  Discussed importance of daily physical activity with review of benefits, methods, guidelines, and precautions. A handout provided on Diabetes and physical activity provided.      Task: Discussed role of physical activity on reducing insulin resistance and improvement in overall glycemic control. Completed 1/25/2024        Task: Discussed role of physical activity as it relates to weight loss Completed 1/25/2024          Follow Up Plan     Follow up in about 1 month (around 2/28/2024).    Today's care plan and follow up schedule was discussed with patient.  Kaylene verbalized understanding of the care plan, goals, and agrees to follow up plan.        The patient was encouraged to communicate with his/her health care provider/physician and care team regarding his/her condition(s) and treatment.  I provided the patient with my contact information today and encouraged to contact me via phone or [facility] Patient Portal as needed.     Length of Visit   Total Time: 90 Minutes

## 2024-01-25 NOTE — LETTER
January 25, 2024      Jamari Stout MD  [address] [facility]  [place] [id number]         Patient: TERRA Pryor   MR Number: [id number]   YOB: 1980   Date of Visit: 1/25/2024       Dear Dr. Stout:    Thank you for referring TERRA for diabetes self-management education and support services. She was seen today for an initial visit. Below is a summary of goals she set to self-manage her diabetes better.  My complete note is in Epic.    Patient Outcomes:    A1c Status:   Lab Results   Component Value Date    HGBA1C 7.0 (H) 01/12/2024    HGBA1C 6.3 12/05/2023     Care Plan: Diabetes Management     Problem: Healthy Eating      Goal: Eat 3 meals daily with 30-45g (2-3) servings of Carbohydrate per meal.    Start Date: 1/25/2024   Expected End Date: 2/28/2024   Barriers: No Barriers Identified   Note:    Reviewed the sources and role of Carbohydrate, Protein, and Fat and how each nutrient impact blood sugar. Provided meal-planning instruction via food lists, plate method, food models, food labels. Reviewed micro/macronutrient effect on health management. Discussed carbohydrate counting and spacing. Reviewed principles of energy metabolism to assist weight and health management. Discussed strategies for healthier dining out and replacing sugary beverages with water and other noncaloric, sugar free options.  Several handouts provided on Carbohydrate Counting, Health Healthy Carb Diet, Planning healthy meals.       Task: Provided visual examples using dry measuring cups, food models, and other familiar objects such as computer mouse, deck or cards, tennis ball etc. to help with visualization of portions. Completed 1/25/2024     Task: Review the importance of balancing carbohydrates with each meal using portion control techniques to count servings of carbohydrate and label reading to identify serving size and amount of total carbs per serving. Completed 1/25/2024     Task: Provided  Sample plate method and reviewed the use of the plate to estimate amounts of carbohydrate per meal. Completed 1/25/2024     Problem: Physical Activity and Exercise      Goal: Patient agrees to increase physical activity to a goal of 5 times per week for 30 minutes.    Start Date: 1/25/2024   Expected End Date: 2/28/2024   Barriers: Other (comments)   Note:    Had a Robotic hysterectomy on 10/25/2023. She is limited to 10 minutes twice a day until following up with HCP.  Discussed importance of daily physical activity with review of benefits, methods, guidelines, and precautions. A handout provided on Diabetes and physical activity provided.      Task: Discussed role of physical activity on reducing insulin resistance and improvement in overall glycemic control. Completed 1/25/2024     Task: Discussed role of physical activity as it relates to weight loss Completed 1/25/2024       Follow up:   KAYLENE to attend medical appointments as scheduled  KAYLENE to update you on her DM education progress as needed  Kaylene has a follow up appointment with me on 2/28/2024.      If you have questions, please do not hesitate to call me. I look forward to providing additional education and support as needed.    Sincerely,    Kiarra Amezquita, FERNIE  Diabetes Care and

## 2024-02-08 ENCOUNTER — HOSPITAL ENCOUNTER (OUTPATIENT)
Dept: CARDIOLOGY | Facility: HOSPITAL | Age: 44
Discharge: HOME | End: 2024-02-08
Attending: INTERNAL MEDICINE
Payer: OTHER GOVERNMENT

## 2024-02-08 VITALS — WEIGHT: 272 LBS | BODY MASS INDEX: 41.22 KG/M2 | HEIGHT: 68 IN

## 2024-02-08 DIAGNOSIS — E11.9 DIABETES MELLITUS WITHOUT COMPLICATION: ICD-10-CM

## 2024-02-08 DIAGNOSIS — E66.9 OBESITY (BMI 35.0-39.9 WITHOUT COMORBIDITY): ICD-10-CM

## 2024-02-08 DIAGNOSIS — R00.2 PALPITATIONS: ICD-10-CM

## 2024-02-08 PROCEDURE — 93306 TTE W/DOPPLER COMPLETE: CPT | Mod: 26,,, | Performed by: INTERNAL MEDICINE

## 2024-02-08 PROCEDURE — 93306 TTE W/DOPPLER COMPLETE: CPT

## 2024-02-12 LAB
OHS QRS DURATION: 80 MS
OHS QTC CALCULATION: 430 MS

## 2024-02-15 LAB
AORTIC ROOT ANNULUS: 2.13 CM
AV INDEX (PROSTH): 0.77
AV MEAN GRADIENT: 3 MMHG
AV PEAK GRADIENT: 7 MMHG
AV VALVE AREA BY VELOCITY RATIO: 10.68 CM²
AV VALVE AREA: 11.37 CM²
AV VELOCITY RATIO: 0.72
BSA FOR ECHO PROCEDURE: 2.43 M2
CV ECHO LV RWT: 0.44 CM
DOP CALC AO PEAK VEL: 1.28 M/S
DOP CALC AO VTI: 22.2 CM
DOP CALC LVOT AREA: 14.9 CM2
DOP CALC LVOT DIAMETER: 4.35 CM
DOP CALC LVOT PEAK VEL: 0.92 M/S
DOP CALC LVOT STROKE VOLUME: 252.52 CM3
DOP CALCLVOT PEAK VEL VTI: 17 CM
E WAVE DECELERATION TIME: 247.09 MSEC
E/A RATIO: 0.85
E/E' RATIO: 5.47 M/S
ECHO LV POSTERIOR WALL: 0.94 CM (ref 0.6–1.1)
FRACTIONAL SHORTENING: 49 % (ref 28–44)
INTERVENTRICULAR SEPTUM: 1.06 CM (ref 0.6–1.1)
IVC DIAMETER: 1.7 CM
LEFT ATRIUM VOLUME INDEX MOD: 11.7 ML/M2
LEFT ATRIUM VOLUME MOD: 27.29 CM3
LEFT INTERNAL DIMENSION IN SYSTOLE: 2.17 CM (ref 2.1–4)
LEFT VENTRICLE DIASTOLIC VOLUME INDEX: 35.5 ML/M2
LEFT VENTRICLE DIASTOLIC VOLUME: 82.72 ML
LEFT VENTRICLE MASS INDEX: 61 G/M2
LEFT VENTRICLE SYSTOLIC VOLUME INDEX: 6.7 ML/M2
LEFT VENTRICLE SYSTOLIC VOLUME: 15.59 ML
LEFT VENTRICULAR INTERNAL DIMENSION IN DIASTOLE: 4.29 CM (ref 3.5–6)
LEFT VENTRICULAR MASS: 141.96 G
LV LATERAL E/E' RATIO: 4.73 M/S
LV SEPTAL E/E' RATIO: 6.5 M/S
LVOT MG: 2.12 MMHG
LVOT MV: 0.69 CM/S
MV PEAK A VEL: 0.61 M/S
MV PEAK E VEL: 0.52 M/S
MV STENOSIS PRESSURE HALF TIME: 71.66 MS
MV VALVE AREA P 1/2 METHOD: 3.07 CM2
PV PEAK GRADIENT: 8 MMHG
PV PEAK VELOCITY: 1.41 M/S
RA VOL SYS: 41.9 ML
RIGHT ATRIAL AREA: 15.5 CM2
RIGHT VENTRICLE DIASTOLIC LENGTH: 6.7 CM
RIGHT VENTRICLE DIASTOLIC MID DIMENSION: 2.4 CM
RIGHT VENTRICULAR END-DIASTOLIC DIMENSION: 3 CM
RV MID DIAMA: 2.37 CM
TDI LATERAL: 0.11 M/S
TDI SEPTAL: 0.08 M/S
TDI: 0.1 M/S
TRICUSPID ANNULAR PLANE SYSTOLIC EXCURSION: 2.4 CM
Z-SCORE OF LEFT VENTRICULAR DIMENSION IN END DIASTOLE: -7.89
Z-SCORE OF LEFT VENTRICULAR DIMENSION IN END SYSTOLE: -7.61

## 2024-02-28 ENCOUNTER — OFFICE VISIT (OUTPATIENT)
Dept: CARDIOLOGY | Facility: CLINIC | Age: 44
End: 2024-02-28
Payer: OTHER GOVERNMENT

## 2024-02-28 VITALS
HEART RATE: 87 BPM | DIASTOLIC BLOOD PRESSURE: 80 MMHG | BODY MASS INDEX: 39.86 KG/M2 | OXYGEN SATURATION: 99 % | SYSTOLIC BLOOD PRESSURE: 120 MMHG | HEIGHT: 68 IN | WEIGHT: 263 LBS

## 2024-02-28 DIAGNOSIS — R00.2 PALPITATIONS: Primary | ICD-10-CM

## 2024-02-28 DIAGNOSIS — I10 PRIMARY HYPERTENSION: ICD-10-CM

## 2024-02-28 DIAGNOSIS — E66.9 OBESITY (BMI 35.0-39.9 WITHOUT COMORBIDITY): ICD-10-CM

## 2024-02-28 DIAGNOSIS — E11.9 DIABETES MELLITUS WITHOUT COMPLICATION: ICD-10-CM

## 2024-02-28 PROCEDURE — 99213 OFFICE O/P EST LOW 20 MIN: CPT | Mod: S$PBB,,, | Performed by: INTERNAL MEDICINE

## 2024-02-28 PROCEDURE — 99214 OFFICE O/P EST MOD 30 MIN: CPT | Mod: PBBFAC | Performed by: INTERNAL MEDICINE

## 2024-02-28 RX ORDER — CLINDAMYCIN HYDROCHLORIDE 150 MG/1
CAPSULE ORAL
COMMUNITY
Start: 2024-02-21

## 2024-02-28 NOTE — PROGRESS NOTES
PCP: Jamari Stout MD    Referring Provider:     Subjective:   Kaylene Pryor is a 43 y.o. female with hx of hypertension and recent diagnosis of diabetes who presents for evaluation of palpitations, come and go spontaneously, not associated with chest pain, pressure or shortenss of breath, have improved with increased physical activity.   She recently undergone MARCO secondary to uterine fibroids, did not affect palpitations.        Fhx:   Family History   Problem Relation Age of Onset    Asthma Mother     Cancer Mother         Melanoma    Hypertension Mother     Diabetes Father     Hypertension Maternal Grandmother     Cancer Maternal Grandfather         Melanoma    Diabetes Paternal Grandmother       Shx:   Past Surgical History:   Procedure Laterality Date    BLADDER REPAIR N/A 10/25/2023    Procedure: REPAIR, BLADDER;  Surgeon: Sophy Milligan MD;  Location: [facility] OR;  Service: OB/GYN;  Laterality: N/A;     SECTION      CHOLECYSTECTOMY      COLPOSCOPY  Not sure    COSMETIC SURGERY   and     abdominoplasty    CYSTOSCOPY N/A 10/25/2023    Procedure: CYSTOSCOPY;  Surgeon: Sophy Milligan MD;  Location: [facility] OR;  Service: OB/GYN;  Laterality: N/A;    HYSTEROSCOPIC POLYPECTOMY OF UTERUS  2023    Procedure: POLYPECTOMY, UTERUS, HYSTEROSCOPIC;  Surgeon: Sophy Milligan MD;  Location: [facility] OR;  Service: OB/GYN;;  PAP SMEAR    HYSTEROSCOPY WITH DILATION AND CURETTAGE OF UTERUS N/A 2023    Procedure: HYSTEROSCOPY, WITH DILATION AND CURETTAGE;  Surgeon: Sophy Milligan MD;  Location: [facility] OR;  Service: OB/GYN;  Laterality: N/A;  with myosure    LIPOSUCTION OF ABDOMEN      TONSILLECTOMY      XI ROBOTIC HYSTERECTOMY, WITH SALPINGECTOMY N/A 10/25/2023    Procedure: XI ROBOTIC HYSTERECTOMY,WITH SALPINGECTOMY;  Surgeon: Sophy Milligan MD;  Location: [facility] OR;  Service: OB/GYN;  Laterality: N/A;        EKG - NSR, normal     ECHO - No results found for this or any previous  visit.       CATH - No results found for this or any previous visit.       Stress - No results found for this or any previous visit.       Lab Results   Component Value Date     01/12/2024    K 3.5 01/12/2024     01/12/2024    CO2 26 01/12/2024    BUN 8 01/12/2024    CREATININE 0.90 01/12/2024    CALCIUM 9.1 01/12/2024    ANIONGAP 11 01/12/2024       Lab Results   Component Value Date    CHOL 157 07/11/2023     Lab Results   Component Value Date    HDL 38 (L) 07/11/2023     Lab Results   Component Value Date    LDLCALC 88 07/11/2023     Lab Results   Component Value Date    TRIG 157 (H) 07/11/2023     Lab Results   Component Value Date    CHOLHDL 4.1 07/11/2023       Lab Results   Component Value Date    WBC 11.30 (H) 01/12/2024    HGB 12.8 01/12/2024    HCT 40.1 01/12/2024    MCV 84.2 01/12/2024     01/12/2024           Current Outpatient Medications:     albuterol (VENTOLIN HFA) 90 mcg/actuation inhaler, Inhale 2 puffs into the lungs every 6 (six) hours as needed for Wheezing. Rescue, Disp: 18 g, Rfl: 0    clindamycin (CLEOCIN) 150 MG capsule, TAKE 1 CAPSULE BY MOUTH EVERY 8 HOURS WITH FOOD UNTIL GONE, Disp: , Rfl:     fluticasone-salmeterol diskus inhaler 250-50 mcg, Inhale 1 puff into the lungs 2 (two) times daily. Controller, Disp: 60 each, Rfl: 11    lisinopriL (PRINIVIL,ZESTRIL) 5 MG tablet, Take 1 tablet (5 mg total) by mouth once daily., Disp: 90 tablet, Rfl: 3    metFORMIN (GLUCOPHAGE-XR) 500 MG ER 24hr tablet, Take 1 tablet (500 mg total) by mouth daily with breakfast., Disp: 90 tablet, Rfl: 3    potassium chloride (MICRO-K) 10 MEQ CpSR, Take 1 capsule (10 mEq total) by mouth once daily., Disp: 90 capsule, Rfl: 3    amLODIPine (NORVASC) 5 MG tablet, Take 1 tablet (5 mg total) by mouth once daily. (Patient not taking: Reported on 2/28/2024), Disp: 90 tablet, Rfl: 3    amoxicillin-clavulanate 875-125mg (AUGMENTIN) 875-125 mg per tablet, Take 1 tablet by mouth every 12 (twelve) hours.  (Patient not taking: Reported on 1/24/2024), Disp: 20 tablet, Rfl: 0    HYDROcodone-acetaminophen (NORCO) 5-325 mg per tablet, Take 1 tablet by mouth every 4 (four) hours as needed for Pain. (Patient not taking: Reported on 1/24/2024), Disp: 30 tablet, Rfl: 0    ibuprofen (ADVIL,MOTRIN) 800 MG tablet, Take 1 tablet (800 mg total) by mouth 3 (three) times daily as needed for Pain. (Patient not taking: Reported on 1/24/2024), Disp: 30 tablet, Rfl: 1    levoFLOXacin (LEVAQUIN) 500 MG tablet, Take 1 tablet (500 mg total) by mouth once daily. (Patient not taking: Reported on 1/24/2024), Disp: 10 tablet, Rfl: 0    LIDOcaine (XYLOCAINE) 5 % Oint ointment, Apply to urethra 4x/day prn pain/discomfort. (Patient not taking: Reported on 2/28/2024), Disp: 30 g, Rfl: 1    methylPREDNISolone (MEDROL DOSEPACK) 4 mg tablet, use as directed (Patient not taking: Reported on 1/24/2024), Disp: 21 each, Rfl: 0    predniSONE (DELTASONE) 20 MG tablet, 2 tabs po q AM x 3 days; 1 tab po q AM x 3 days (Patient not taking: Reported on 1/24/2024), Disp: 9 tablet, Rfl: 0    Review of Systems   Constitutional: Positive for weight gain. Negative for diaphoresis, malaise/fatigue and night sweats.        Wt up 20 lbs over last year   HENT:  Positive for congestion and hearing loss. Negative for ear pain, nosebleeds and sore throat.    Eyes:  Negative for blurred vision, double vision, pain, photophobia and visual disturbance.   Cardiovascular:  Positive for palpitations. Negative for chest pain, claudication, dyspnea on exertion, irregular heartbeat, leg swelling, near-syncope, orthopnea and syncope.   Respiratory:  Positive for shortness of breath. Negative for cough, sleep disturbances due to breathing, snoring and wheezing.         SOB, hx of asthma, on albuteral, does not seem to affect palpitations, does get jittery when uses MDI  BRIANNA: not compliant with CPAP   Endocrine: Positive for polydipsia and polyphagia. Negative for cold intolerance,  "heat intolerance and polyuria.        Monitoring bs,  to 165   Hematologic/Lymphatic: Negative for bleeding problem. Does not bruise/bleed easily.   Skin:  Negative for dry skin, flushing, itching, rash and skin cancer.   Musculoskeletal:  Negative for arthritis, back pain, falls, joint pain, muscle cramps, muscle weakness and myalgias.        Fibromyalgia   Gastrointestinal:  Positive for diarrhea. Negative for abdominal pain, change in bowel habit, constipation, dysphagia, heartburn, nausea and vomiting.   Genitourinary:  Negative for bladder incontinence, dysuria, flank pain, frequency and nocturia.   Neurological:  Positive for headaches. Negative for dizziness, focal weakness, light-headedness, loss of balance, numbness, paresthesias and seizures.   Psychiatric/Behavioral:  Negative for depression, memory loss and substance abuse. The patient is nervous/anxious.    Allergic/Immunologic: Negative for environmental allergies.          Objective:   /80 (BP Location: Right arm, Patient Position: Sitting)   Pulse 87   Ht 5' 8" (1.727 m)   Wt 119.3 kg (263 lb)   SpO2 99%   BMI 39.99 kg/m²       Physical Exam  Vitals and nursing note reviewed.   Constitutional:       Appearance: Normal appearance. She is obese.   HENT:      Head: Normocephalic and atraumatic.      Right Ear: External ear normal.      Left Ear: External ear normal.   Eyes:      General: No scleral icterus.        Right eye: No discharge.         Left eye: No discharge.      Extraocular Movements: Extraocular movements intact.      Conjunctiva/sclera: Conjunctivae normal.      Pupils: Pupils are equal, round, and reactive to light.   Cardiovascular:      Rate and Rhythm: Normal rate and regular rhythm.      Pulses: Normal pulses.      Heart sounds: Normal heart sounds. No murmur heard.     No friction rub. No gallop.   Pulmonary:      Effort: Pulmonary effort is normal.      Breath sounds: Normal breath sounds. No wheezing, rhonchi or " rales.   Chest:      Chest wall: No tenderness.   Abdominal:      General: Abdomen is flat. Bowel sounds are normal. There is no distension.      Palpations: Abdomen is soft.      Tenderness: There is no abdominal tenderness. There is no guarding or rebound.   Musculoskeletal:         General: No swelling or tenderness. Normal range of motion.      Cervical back: Normal range of motion and neck supple.   Skin:     General: Skin is warm and dry.      Findings: No erythema or rash.   Neurological:      General: No focal deficit present.      Mental Status: She is alert and oriented to person, place, and time.      Cranial Nerves: No cranial nerve deficit.      Motor: No weakness.      Gait: Gait normal.   Psychiatric:         Mood and Affect: Mood normal.         Behavior: Behavior normal.         Thought Content: Thought content normal.         Judgment: Judgment normal.     Results for orders placed during the hospital encounter of 02/08/24    Echo    Interpretation Summary    Left Ventricle: The left ventricle is normal in size. Normal wall thickness. Normal wall motion. There is normal systolic function with a visually estimated ejection fraction of 55 - 60%. There is normal diastolic function.    Right Ventricle: Normal right ventricular cavity size. Systolic function is normal.    Mitral Valve: There is mild bileaflet sclerosis.       Assessment:     1. Palpitations        2. Primary hypertension        3. Obesity (BMI 35.0-39.9 without comorbidity)        4. Diabetes mellitus without complication              Plan:   Palpitations: very rare PVCs, short runs of asymptomatic SVT on Zio, discussed exercise versus low dose beta blockade, wants to resume exercise program, continue to monitor symptoms.  Hypertension: with  larger cuff bp is well controlled.   DM type 2 adult onset, better controlled with dietary compliance.  Morbid obesity with 9 lb weight loss over last two months  BRIANNA: not treated, pt refuses to  wear CPAP, will rerefer to sleep medicine.   Fibromyalgia: on steroids.  7.   Uterine fibriods with recent MARCO  8.   Chronic diarrhea: refer to GI  9.   Heart murmur, consistent with MR, very mild, continue to monitor      Follow up one year, sooner if symptoms change.

## 2024-03-15 ENCOUNTER — PATIENT MESSAGE (OUTPATIENT)
Dept: DIABETES | Facility: CLINIC | Age: 44
End: 2024-03-15
Payer: OTHER GOVERNMENT

## 2024-09-03 ENCOUNTER — OFFICE VISIT (OUTPATIENT)
Dept: FAMILY MEDICINE | Facility: CLINIC | Age: 44
End: 2024-09-03
Payer: OTHER GOVERNMENT

## 2024-09-03 VITALS
BODY MASS INDEX: 43.04 KG/M2 | RESPIRATION RATE: 20 BRPM | WEIGHT: 284 LBS | HEIGHT: 68 IN | HEART RATE: 100 BPM | DIASTOLIC BLOOD PRESSURE: 89 MMHG | SYSTOLIC BLOOD PRESSURE: 135 MMHG | OXYGEN SATURATION: 98 %

## 2024-09-03 DIAGNOSIS — I10 PRIMARY HYPERTENSION: ICD-10-CM

## 2024-09-03 DIAGNOSIS — Z12.11 COLON CANCER SCREENING: ICD-10-CM

## 2024-09-03 DIAGNOSIS — J45.20 MILD INTERMITTENT ASTHMA WITHOUT COMPLICATION: ICD-10-CM

## 2024-09-03 DIAGNOSIS — J45.21 MILD INTERMITTENT ASTHMA WITH ACUTE EXACERBATION: ICD-10-CM

## 2024-09-03 DIAGNOSIS — E11.9 TYPE 2 DIABETES MELLITUS WITHOUT COMPLICATION, WITHOUT LONG-TERM CURRENT USE OF INSULIN: Primary | ICD-10-CM

## 2024-09-03 DIAGNOSIS — E66.01 CLASS 3 SEVERE OBESITY DUE TO EXCESS CALORIES WITH BODY MASS INDEX (BMI) OF 40.0 TO 44.9 IN ADULT, UNSPECIFIED WHETHER SERIOUS COMORBIDITY PRESENT: ICD-10-CM

## 2024-09-03 DIAGNOSIS — E55.9 VITAMIN D DEFICIENCY: ICD-10-CM

## 2024-09-03 LAB
25(OH)D3 SERPL-MCNC: 15.8 NG/ML
ALBUMIN SERPL BCP-MCNC: 3.3 G/DL (ref 3.5–5)
ALBUMIN/GLOB SERPL: 0.8 {RATIO}
ALP SERPL-CCNC: 78 U/L (ref 39–100)
ALT SERPL W P-5'-P-CCNC: 200 U/L (ref 13–56)
ANION GAP SERPL CALCULATED.3IONS-SCNC: 11 MMOL/L (ref 7–16)
AST SERPL W P-5'-P-CCNC: 271 U/L (ref 15–37)
BASOPHILS # BLD AUTO: 0.1 K/UL (ref 0–0.2)
BASOPHILS NFR BLD AUTO: 1 % (ref 0–1)
BILIRUB SERPL-MCNC: 1.1 MG/DL (ref ?–1.2)
BUN SERPL-MCNC: 11 MG/DL (ref 7–18)
BUN/CREAT SERPL: 15 (ref 6–20)
CALCIUM SERPL-MCNC: 9.3 MG/DL (ref 8.5–10.1)
CHLORIDE SERPL-SCNC: 102 MMOL/L (ref 98–107)
CHOLEST SERPL-MCNC: 213 MG/DL (ref 0–200)
CHOLEST/HDLC SERPL: 6.1 {RATIO}
CO2 SERPL-SCNC: 27 MMOL/L (ref 21–32)
CREAT SERPL-MCNC: 0.71 MG/DL (ref 0.55–1.02)
CREAT UR-MCNC: 172 MG/DL (ref 28–219)
DIFFERENTIAL METHOD BLD: ABNORMAL
EGFR (NO RACE VARIABLE) (RUSH/TITUS): 107 ML/MIN/1.73M2
EOSINOPHIL # BLD AUTO: 0.59 K/UL (ref 0–0.5)
EOSINOPHIL NFR BLD AUTO: 6.2 % (ref 1–4)
ERYTHROCYTE [DISTWIDTH] IN BLOOD BY AUTOMATED COUNT: 12.7 % (ref 11.5–14.5)
EST. AVERAGE GLUCOSE BLD GHB EST-MCNC: 197 MG/DL
GLOBULIN SER-MCNC: 4.1 G/DL (ref 2–4)
GLUCOSE SERPL-MCNC: 178 MG/DL (ref 74–106)
HBA1C MFR BLD HPLC: 8.5 % (ref 4.5–6.6)
HCT VFR BLD AUTO: 44.4 % (ref 38–47)
HDLC SERPL-MCNC: 35 MG/DL (ref 40–60)
HGB BLD-MCNC: 14.3 G/DL (ref 12–16)
IMM GRANULOCYTES # BLD AUTO: 0.02 K/UL (ref 0–0.04)
IMM GRANULOCYTES NFR BLD: 0.2 % (ref 0–0.4)
LDLC SERPL CALC-MCNC: 127 MG/DL
LDLC/HDLC SERPL: 3.6 {RATIO}
LYMPHOCYTES # BLD AUTO: 3.3 K/UL (ref 1–4.8)
LYMPHOCYTES NFR BLD AUTO: 34.5 % (ref 27–41)
MCH RBC QN AUTO: 29.9 PG (ref 27–31)
MCHC RBC AUTO-ENTMCNC: 32.2 G/DL (ref 32–36)
MCV RBC AUTO: 92.9 FL (ref 80–96)
MONOCYTES # BLD AUTO: 0.7 K/UL (ref 0–0.8)
MONOCYTES NFR BLD AUTO: 7.3 % (ref 2–6)
MPC BLD CALC-MCNC: 11.3 FL (ref 9.4–12.4)
NEUTROPHILS # BLD AUTO: 4.85 K/UL (ref 1.8–7.7)
NEUTROPHILS NFR BLD AUTO: 50.8 % (ref 53–65)
NONHDLC SERPL-MCNC: 178 MG/DL
NRBC # BLD AUTO: 0 X10E3/UL
NRBC, AUTO (.00): 0 %
PLATELET # BLD AUTO: 288 K/UL (ref 150–400)
POTASSIUM SERPL-SCNC: 3.5 MMOL/L (ref 3.5–5.1)
PROT SERPL-MCNC: 7.4 G/DL (ref 6.4–8.2)
PROT UR-MCNC: 9.9 MG/DL (ref 0–11.9)
PROT/CREAT 24H UR-RTO: 0.06
RBC # BLD AUTO: 4.78 M/UL (ref 4.2–5.4)
SODIUM SERPL-SCNC: 136 MMOL/L (ref 136–145)
TRIGL SERPL-MCNC: 256 MG/DL (ref 35–150)
VLDLC SERPL-MCNC: 51 MG/DL
WBC # BLD AUTO: 9.56 K/UL (ref 4.5–11)

## 2024-09-03 PROCEDURE — 84156 ASSAY OF PROTEIN URINE: CPT | Mod: ,,, | Performed by: CLINICAL MEDICAL LABORATORY

## 2024-09-03 PROCEDURE — 85025 COMPLETE CBC W/AUTO DIFF WBC: CPT | Mod: ,,, | Performed by: CLINICAL MEDICAL LABORATORY

## 2024-09-03 PROCEDURE — 99214 OFFICE O/P EST MOD 30 MIN: CPT | Mod: ,,, | Performed by: FAMILY MEDICINE

## 2024-09-03 PROCEDURE — 83036 HEMOGLOBIN GLYCOSYLATED A1C: CPT | Mod: ,,, | Performed by: CLINICAL MEDICAL LABORATORY

## 2024-09-03 PROCEDURE — 82570 ASSAY OF URINE CREATININE: CPT | Mod: ,,, | Performed by: CLINICAL MEDICAL LABORATORY

## 2024-09-03 PROCEDURE — 80061 LIPID PANEL: CPT | Mod: ,,, | Performed by: CLINICAL MEDICAL LABORATORY

## 2024-09-03 PROCEDURE — 80053 COMPREHEN METABOLIC PANEL: CPT | Mod: ,,, | Performed by: CLINICAL MEDICAL LABORATORY

## 2024-09-03 PROCEDURE — 82306 VITAMIN D 25 HYDROXY: CPT | Mod: ,,, | Performed by: CLINICAL MEDICAL LABORATORY

## 2024-09-03 RX ORDER — TIRZEPATIDE 2.5 MG/.5ML
2.5 INJECTION, SOLUTION SUBCUTANEOUS
Qty: 4 PEN | Refills: 2 | Status: SHIPPED | OUTPATIENT
Start: 2024-09-03 | End: 2024-09-05 | Stop reason: SDUPTHER

## 2024-09-03 RX ORDER — TIRZEPATIDE 2.5 MG/.5ML
2.5 INJECTION, SOLUTION SUBCUTANEOUS
Qty: 4 PEN | Refills: 2 | Status: SHIPPED | OUTPATIENT
Start: 2024-09-03 | End: 2024-09-03 | Stop reason: SDUPTHER

## 2024-09-03 RX ORDER — ALBUTEROL SULFATE 90 UG/1
2 INHALANT RESPIRATORY (INHALATION) EVERY 6 HOURS PRN
Qty: 18 G | Refills: 0 | Status: SHIPPED | OUTPATIENT
Start: 2024-09-03 | End: 2025-09-03

## 2024-09-04 DIAGNOSIS — E55.9 VITAMIN D DEFICIENCY: Primary | ICD-10-CM

## 2024-09-04 RX ORDER — ERGOCALCIFEROL 1.25 MG/1
50000 CAPSULE ORAL
Qty: 12 CAPSULE | Refills: 0 | Status: SHIPPED | OUTPATIENT
Start: 2024-09-04

## 2024-09-05 DIAGNOSIS — E11.9 TYPE 2 DIABETES MELLITUS WITHOUT COMPLICATION, WITHOUT LONG-TERM CURRENT USE OF INSULIN: ICD-10-CM

## 2024-09-05 RX ORDER — TIRZEPATIDE 2.5 MG/.5ML
2.5 INJECTION, SOLUTION SUBCUTANEOUS
Qty: 4 PEN | Refills: 2 | Status: SHIPPED | OUTPATIENT
Start: 2024-09-05

## 2024-09-05 NOTE — PROGRESS NOTES
Jamari Stout MD        PATIENT NAME: Kaylene Pryor  : 1980  DATE: 9/3/24  MRN: [id number]      Billing Provider: Jamari Stout MD  Level of Service: [place] OFFICE/OUTPT VISIT, EST, LEVL IV, 30-39 MIN  Patient PCP Information       Provider PCP Type    Jamari Stout MD General            Reason for Visit / Chief Complaint: Diabetes (Check up. Has not taken metformin. )       Update PCP  Update Chief Complaint         History of Present Illness / Problem Focused Workflow     Kaylene Pryor presents to the clinic with Diabetes (Check up. Has not taken metformin. )     For overall follow-up.  No significant interval change.  She requests help with weight loss.  Rinses recently gained weight.    Diabetes  Hypoglycemia symptoms include headaches. Pertinent negatives for hypoglycemia include no confusion, dizziness, nervousness/anxiousness, seizures or tremors. Pertinent negatives for diabetes include no chest pain, no polydipsia, no polyphagia, no polyuria and no weakness.       Review of Systems     Review of Systems   Constitutional:  Positive for unexpected weight change. Negative for activity change, appetite change and fever.   HENT:  Positive for hearing loss. Negative for congestion, rhinorrhea, sinus pressure, sinus pain, sore throat and trouble swallowing.    Eyes:  Negative for photophobia, pain, discharge and visual disturbance.   Respiratory:  Positive for wheezing. Negative for cough, chest tightness and stridor.    Cardiovascular:  Negative for chest pain, palpitations and leg swelling.   Gastrointestinal:  Negative for abdominal pain, blood in stool, constipation, diarrhea, nausea and vomiting.   Endocrine: Negative for polydipsia, polyphagia and polyuria.   Genitourinary:  Negative for difficulty urinating, dysuria, flank pain, hematuria and menstrual problem.   Musculoskeletal:  Positive for arthralgias. Negative for joint swelling and neck pain.   Skin:  Negative for rash.    Allergic/Immunologic: Negative for food allergies.   Neurological:  Positive for headaches. Negative for dizziness, tremors, seizures, syncope and weakness.   Psychiatric/Behavioral:  Negative for behavioral problems, confusion, decreased concentration, dysphoric mood and hallucinations. The patient is not nervous/anxious.         Medical / Social / Family History     Past Medical History:   Diagnosis Date    Abnormal uterine bleeding 2023    Mussed mercy period. Been bleeding since     Fibroid     Told at ER i have a medium size one    Hypertension 2023    Just started meds    Obesity (BMI 35.0-39.9 without comorbidity)        Past Surgical History:   Procedure Laterality Date    BLADDER REPAIR N/A 10/25/2023    Procedure: REPAIR, BLADDER;  Surgeon: Sophy Milligan MD;  Location: [facility] OR;  Service: OB/GYN;  Laterality: N/A;     SECTION      CHOLECYSTECTOMY      COLPOSCOPY  Not sure    COSMETIC SURGERY   and     abdominoplasty    CYSTOSCOPY N/A 10/25/2023    Procedure: CYSTOSCOPY;  Surgeon: Sophy Milligan MD;  Location: [facility] OR;  Service: OB/GYN;  Laterality: N/A;    HYSTEROSCOPIC POLYPECTOMY OF UTERUS  2023    Procedure: POLYPECTOMY, UTERUS, HYSTEROSCOPIC;  Surgeon: Sophy Milligan MD;  Location: [facility] OR;  Service: OB/GYN;;  PAP SMEAR    HYSTEROSCOPY WITH DILATION AND CURETTAGE OF UTERUS N/A 2023    Procedure: HYSTEROSCOPY, WITH DILATION AND CURETTAGE;  Surgeon: Sophy Milligan MD;  Location: [facility] OR;  Service: OB/GYN;  Laterality: N/A;  with myosure    LIPOSUCTION OF ABDOMEN      TONSILLECTOMY      XI ROBOTIC HYSTERECTOMY, WITH SALPINGECTOMY N/A 10/25/2023    Procedure: XI ROBOTIC HYSTERECTOMY,WITH SALPINGECTOMY;  Surgeon: Sophy Milligan MD;  Location: [facility] OR;  Service: OB/GYN;  Laterality: N/A;       Social History  Ms.  reports that she has never smoked. She has never been exposed to tobacco smoke. She has never used smokeless  tobacco. She reports that she does not drink alcohol and does not use drugs.    Family History  Ms.'s family history includes Asthma in her mother; Cancer in her maternal grandfather and mother; Diabetes in her father and paternal grandmother; Hypertension in her maternal grandmother and mother.    Medications and Allergies     Medications  No outpatient medications have been marked as taking for the 9/3/24 encounter (Office Visit) with Jamari Stout MD.       Allergies  Review of patient's allergies indicates:   Allergen Reactions    Opioids - morphine analogues Nausea And Vomiting       Physical Examination     Vitals:    09/03/24 1538   BP: 135/89   Pulse: 100   Resp: 20     Physical Exam  Constitutional:       General: She is not in acute distress.     Appearance: Normal appearance.   HENT:      Head: Normocephalic.      Right Ear: Tympanic membrane and ear canal normal.      Left Ear: Tympanic membrane and ear canal normal.      Nose: Nose normal.      Mouth/Throat:      Mouth: Mucous membranes are moist.      Pharynx: No oropharyngeal exudate.   Eyes:      Extraocular Movements: Extraocular movements intact.      Pupils: Pupils are equal, round, and reactive to light.   Cardiovascular:      Rate and Rhythm: Normal rate and regular rhythm.      Heart sounds: No murmur heard.  Pulmonary:      Effort: Pulmonary effort is normal.      Breath sounds: Normal breath sounds. No wheezing.   Abdominal:      General: Abdomen is flat. Bowel sounds are normal.      Palpations: Abdomen is soft.      Hernia: No hernia is present.   Musculoskeletal:         General: Normal range of motion.      Cervical back: Normal range of motion and neck supple.      Right lower leg: No edema.      Left lower leg: No edema.   Lymphadenopathy:      Cervical: No cervical adenopathy.   Skin:     General: Skin is warm and dry.      Coloration: Skin is not jaundiced.      Findings: No lesion.   Neurological:      General: No focal deficit  present.      Mental Status: She is alert and oriented to person, place, and time.      Cranial Nerves: No cranial nerve deficit.      Gait: Gait normal.   Psychiatric:         Mood and Affect: Mood normal.         Behavior: Behavior normal.         Judgment: Judgment normal.          Assessment and Plan (including Health Maintenance)      Problem List  Smart Sets  Document Outside HM   :    Plan:     1. Type 2 diabetes mellitus without complication, without long-term current use of insulin    -     Protein/Creatinine Ratio, Urine  -     Hemoglobin A1C; Future; Expected date: 09/03/2024  -     tirzepatide (MOUNJARO) 2.5 mg/0.5 mL PnIj; Inject 2.5 mg into the skin every 7 days.  Dispense: 4 Pen; Refill: 2    2. Mild intermittent asthma without complication  The current medical regimen is effective;  continue present plan and medications.  -     albuterol (VENTOLIN HFA) 90 mcg/actuation inhaler; Inhale 2 puffs into the lungs every 6 (six) hours as needed for Wheezing. Rescue  Dispense: 18 g; Refill: 0    3. Class 3 severe obesity due to excess calories with body mass index (BMI) of 40.0 to 44.9 in adult, unspecified whether serious comorbidity present      4. Primary hypertension  The current medical regimen is effective;  continue present plan and medications.  -     CBC Auto Differential; Future; Expected date: 03/03/2025  -     Comprehensive Metabolic Panel; Future; Expected date: 03/03/2025  -     Lipid Panel; Future; Expected date: 03/03/2025    5. Vitamin D deficiency    -     Vitamin D; Future; Expected date: 09/03/2024    6. Colon cancer screening    -     Colonoscopy; Future; Expected date: 09/03/2024    7. Mild intermittent asthma with acute exacerbation  The current medical regimen is effective;  continue present plan and medications.          Health Maintenance Due   Topic Date Due    Hepatitis C Screening  Never done    Diabetes Urine Screening  Never done    Pneumococcal Vaccines (Age 0-64) (1 of 2 -  PCV) Never done    TETANUS VACCINE  Never done    Colorectal Cancer Screening  Never done    Influenza Vaccine (1) Never done    COVID-19 Vaccine (1 - 2023-24 season) Never done    Mammogram  09/28/2024       1. Type 2 diabetes mellitus without complication, without long-term current use of insulin  -     Protein/Creatinine Ratio, Urine  -     Hemoglobin A1C; Future; Expected date: 09/03/2024  -     tirzepatide (MOUNJARO) 2.5 mg/0.5 mL PnIj; Inject 2.5 mg into the skin every 7 days.  Dispense: 4 Pen; Refill: 2    2. Mild intermittent asthma without complication  -     albuterol (VENTOLIN HFA) 90 mcg/actuation inhaler; Inhale 2 puffs into the lungs every 6 (six) hours as needed for Wheezing. Rescue  Dispense: 18 g; Refill: 0    3. Class 3 severe obesity due to excess calories with body mass index (BMI) of 40.0 to 44.9 in adult, unspecified whether serious comorbidity present  -     Discontinue: tirzepatide (MOUNJARO) 2.5 mg/0.5 mL PnIj; Inject 2.5 mg into the skin every 7 days.  Dispense: 4 Pen; Refill: 2    4. Primary hypertension  -     CBC Auto Differential; Future; Expected date: 03/03/2025  -     Comprehensive Metabolic Panel; Future; Expected date: 03/03/2025  -     Lipid Panel; Future; Expected date: 03/03/2025    5. Vitamin D deficiency  -     Vitamin D; Future; Expected date: 09/03/2024    6. Colon cancer screening  -     Colonoscopy; Future; Expected date: 09/03/2024    7. Mild intermittent asthma with acute exacerbation         Health Maintenance Topics with due status: Not Due       Topic Last Completion Date    Eye Exam 07/23/2024    Foot Exam 09/03/2024    Lipid Panel 09/03/2024    Hemoglobin A1c 09/03/2024       Future Appointments   Date Time Provider Department Center   10/3/2024  1:00 PM [facility] [place]        There are no Patient Instructions on file for this visit.  Follow up in about 4 weeks (around 10/1/2024) for routine followup.     Signature:  Jamari Stout  MD      Date of encounter: 9/3/24

## 2024-09-08 ENCOUNTER — PATIENT MESSAGE (OUTPATIENT)
Dept: FAMILY MEDICINE | Facility: CLINIC | Age: 44
End: 2024-09-08
Payer: OTHER GOVERNMENT

## 2024-09-18 ENCOUNTER — PATIENT MESSAGE (OUTPATIENT)
Dept: FAMILY MEDICINE | Facility: CLINIC | Age: 44
End: 2024-09-18
Payer: OTHER GOVERNMENT

## 2024-09-25 ENCOUNTER — PATIENT MESSAGE (OUTPATIENT)
Dept: FAMILY MEDICINE | Facility: CLINIC | Age: 44
End: 2024-09-25
Payer: OTHER GOVERNMENT

## 2024-09-26 DIAGNOSIS — E11.9 TYPE 2 DIABETES MELLITUS WITHOUT COMPLICATION, WITHOUT LONG-TERM CURRENT USE OF INSULIN: Primary | ICD-10-CM

## 2024-09-26 RX ORDER — TIRZEPATIDE 5 MG/.5ML
5 INJECTION, SOLUTION SUBCUTANEOUS
COMMUNITY
End: 2024-09-26 | Stop reason: SDUPTHER

## 2024-09-27 RX ORDER — TIRZEPATIDE 5 MG/.5ML
5 INJECTION, SOLUTION SUBCUTANEOUS
Qty: 0.5 ML | Refills: 2 | Status: SHIPPED | OUTPATIENT
Start: 2024-09-27

## 2024-10-09 DIAGNOSIS — Z12.12 ENCOUNTER FOR COLORECTAL CANCER SCREENING: Primary | ICD-10-CM

## 2024-10-09 DIAGNOSIS — Z12.11 ENCOUNTER FOR COLORECTAL CANCER SCREENING: Primary | ICD-10-CM

## 2024-10-09 RX ORDER — POLYETHYLENE GLYCOL 3350, SODIUM SULFATE ANHYDROUS, SODIUM BICARBONATE, SODIUM CHLORIDE, POTASSIUM CHLORIDE 236; 22.74; 6.74; 5.86; 2.97 G/4L; G/4L; G/4L; G/4L; G/4L
4 POWDER, FOR SOLUTION ORAL ONCE
Qty: 4000 ML | Refills: 0 | Status: SHIPPED | OUTPATIENT
Start: 2024-10-09 | End: 2024-10-09

## 2024-10-25 ENCOUNTER — PATIENT MESSAGE (OUTPATIENT)
Dept: FAMILY MEDICINE | Facility: CLINIC | Age: 44
End: 2024-10-25
Payer: OTHER GOVERNMENT

## 2024-10-25 DIAGNOSIS — E11.9 TYPE 2 DIABETES MELLITUS WITHOUT COMPLICATION, WITHOUT LONG-TERM CURRENT USE OF INSULIN: Primary | ICD-10-CM

## 2024-10-25 RX ORDER — TIRZEPATIDE 7.5 MG/.5ML
7.5 INJECTION, SOLUTION SUBCUTANEOUS
COMMUNITY
End: 2024-10-25 | Stop reason: SDUPTHER

## 2024-10-28 RX ORDER — TIRZEPATIDE 7.5 MG/.5ML
7.5 INJECTION, SOLUTION SUBCUTANEOUS
Qty: 2 ML | Refills: 0 | Status: SHIPPED | OUTPATIENT
Start: 2024-10-28 | End: 2024-11-25

## 2024-11-26 ENCOUNTER — PATIENT MESSAGE (OUTPATIENT)
Dept: FAMILY MEDICINE | Facility: CLINIC | Age: 44
End: 2024-11-26
Payer: OTHER GOVERNMENT

## 2024-11-26 DIAGNOSIS — E11.9 TYPE 2 DIABETES MELLITUS WITHOUT COMPLICATION, WITHOUT LONG-TERM CURRENT USE OF INSULIN: Primary | ICD-10-CM

## 2024-11-26 DIAGNOSIS — R11.0 NAUSEA: ICD-10-CM

## 2024-11-26 RX ORDER — ONDANSETRON 4 MG/1
8 TABLET, FILM COATED ORAL EVERY 6 HOURS PRN
Qty: 60 TABLET | Refills: 2 | Status: SHIPPED | OUTPATIENT
Start: 2024-11-26

## 2024-11-26 RX ORDER — ONDANSETRON 4 MG/1
8 TABLET, FILM COATED ORAL 2 TIMES DAILY
COMMUNITY
End: 2024-11-26 | Stop reason: SDUPTHER

## 2024-11-26 RX ORDER — TIRZEPATIDE 10 MG/.5ML
10 INJECTION, SOLUTION SUBCUTANEOUS
Qty: 2 ML | Refills: 2 | Status: SHIPPED | OUTPATIENT
Start: 2024-11-26

## 2024-11-26 RX ORDER — TIRZEPATIDE 10 MG/.5ML
10 INJECTION, SOLUTION SUBCUTANEOUS
COMMUNITY
End: 2024-11-26 | Stop reason: SDUPTHER

## 2024-11-29 DIAGNOSIS — E87.6 HYPOKALEMIA: ICD-10-CM

## 2024-11-29 DIAGNOSIS — I10 PRIMARY HYPERTENSION: ICD-10-CM

## 2024-11-29 RX ORDER — POTASSIUM CHLORIDE 750 MG/1
10 CAPSULE, EXTENDED RELEASE ORAL DAILY
Qty: 90 CAPSULE | Refills: 3 | Status: SHIPPED | OUTPATIENT
Start: 2024-11-29

## 2024-11-29 RX ORDER — LISINOPRIL 5 MG/1
5 TABLET ORAL DAILY
Qty: 90 TABLET | Refills: 3 | Status: SHIPPED | OUTPATIENT
Start: 2024-11-29

## 2024-12-17 ENCOUNTER — OFFICE VISIT (OUTPATIENT)
Dept: FAMILY MEDICINE | Facility: CLINIC | Age: 44
End: 2024-12-17
Payer: OTHER GOVERNMENT

## 2024-12-17 VITALS
SYSTOLIC BLOOD PRESSURE: 113 MMHG | WEIGHT: 256 LBS | HEIGHT: 68 IN | TEMPERATURE: 98 F | HEART RATE: 78 BPM | OXYGEN SATURATION: 96 % | DIASTOLIC BLOOD PRESSURE: 80 MMHG | RESPIRATION RATE: 18 BRPM | BODY MASS INDEX: 38.8 KG/M2

## 2024-12-17 DIAGNOSIS — I10 PRIMARY HYPERTENSION: ICD-10-CM

## 2024-12-17 DIAGNOSIS — E11.9 TYPE 2 DIABETES MELLITUS WITHOUT COMPLICATION, WITHOUT LONG-TERM CURRENT USE OF INSULIN: Primary | ICD-10-CM

## 2024-12-17 DIAGNOSIS — J45.21 MILD INTERMITTENT ASTHMA WITH ACUTE EXACERBATION: ICD-10-CM

## 2024-12-17 LAB
CREAT UR-MCNC: 416 MG/DL (ref 15–325)
EST. AVERAGE GLUCOSE BLD GHB EST-MCNC: 100 MG/DL
HBA1C MFR BLD HPLC: 5.1 %
MICROALBUMIN UR-MCNC: 2.7 MG/DL
MICROALBUMIN/CREAT RATIO PNL UR: 6.5 MG/G (ref 0–30)

## 2024-12-17 PROCEDURE — 99214 OFFICE O/P EST MOD 30 MIN: CPT | Mod: 25,,, | Performed by: FAMILY MEDICINE

## 2024-12-17 PROCEDURE — 82043 UR ALBUMIN QUANTITATIVE: CPT | Mod: ,,, | Performed by: CLINICAL MEDICAL LABORATORY

## 2024-12-17 PROCEDURE — 83036 HEMOGLOBIN GLYCOSYLATED A1C: CPT | Mod: ,,, | Performed by: CLINICAL MEDICAL LABORATORY

## 2024-12-17 PROCEDURE — 82570 ASSAY OF URINE CREATININE: CPT | Mod: ,,, | Performed by: CLINICAL MEDICAL LABORATORY

## 2024-12-17 PROCEDURE — G0008 ADMIN INFLUENZA VIRUS VAC: HCPCS | Mod: ,,, | Performed by: FAMILY MEDICINE

## 2024-12-17 PROCEDURE — 90656 IIV3 VACC NO PRSV 0.5 ML IM: CPT | Mod: ,,, | Performed by: FAMILY MEDICINE

## 2024-12-17 RX ORDER — FLUTICASONE PROPIONATE AND SALMETEROL 250; 50 UG/1; UG/1
1 POWDER RESPIRATORY (INHALATION) 2 TIMES DAILY
Qty: 180 EACH | Refills: 3 | Status: SHIPPED | OUTPATIENT
Start: 2024-12-17 | End: 2025-12-17

## 2024-12-17 RX ORDER — TIRZEPATIDE 12.5 MG/.5ML
12.5 INJECTION, SOLUTION SUBCUTANEOUS
Qty: 4 PEN | Refills: 2 | Status: SHIPPED | OUTPATIENT
Start: 2024-12-17

## 2024-12-18 NOTE — PROGRESS NOTES
Jamari Stout MD        PATIENT NAME: Kaylene Pryor  : 1980  DATE: 24  MRN: [id number]      Billing Provider: Jamari Stout MD  Level of Service: [place] OFFICE/OUTPT VISIT, EST, LEVL IV, 30-39 MIN  Patient PCP Information       Provider PCP Type    Jamari Stout MD General            Reason for Visit / Chief Complaint: Diabetes (Check up)       Update PCP  Update Chief Complaint         History of Present Illness / Problem Focused Workflow     Kaylene Pryor presents to the clinic with Diabetes (Check up)     Routine followup.  No significant interval change.  Has lost 30 lbs    Diabetes  She has type 2 diabetes mellitus. No MedicAlert identification noted. The initial diagnosis of diabetes was made 11 months ago. Hypoglycemia symptoms include headaches. Pertinent negatives for hypoglycemia include no confusion, dizziness, hunger, mood changes, nervousness/anxiousness, pallor, seizures, sleepiness, speech difficulty, sweats or tremors. Associated symptoms include fatigue, foot paresthesias and polydipsia. Pertinent negatives for diabetes include no blurred vision, no chest pain, no foot ulcerations, no polyphagia, no polyuria, no visual change, no weakness and no weight loss. Pertinent negatives for hypoglycemia complications include no blackouts, no hospitalization, no nocturnal hypoglycemia, no required assistance and no required glucagon injection. Symptoms are stable. Diabetic complications include PVD. Pertinent negatives for diabetic complications include no autonomic neuropathy, CVA, heart disease, nephropathy, peripheral neuropathy or retinopathy. Risk factors for coronary artery disease include family history, hypertension, obesity and diabetes mellitus. Current diabetic treatment includes diet and oral agent (monotherapy). She is compliant with treatment most of the time. Her weight is decreasing steadily. She is following a generally healthy diet. When asked about meal planning, she  reported none. She has not had a previous visit with a dietitian. She rarely participates in exercise. She does not see a podiatrist.Eye exam is current.       Review of Systems     Review of Systems   Constitutional:  Positive for fatigue. Negative for activity change, appetite change, fever, unexpected weight change and weight loss.   HENT:  Negative for congestion, rhinorrhea, sinus pressure, sinus pain, sore throat and trouble swallowing.    Eyes:  Negative for blurred vision, photophobia, pain, discharge and visual disturbance.   Respiratory:  Negative for cough, chest tightness, wheezing and stridor.    Cardiovascular:  Negative for chest pain, palpitations and leg swelling.   Gastrointestinal:  Negative for abdominal pain, blood in stool, constipation, diarrhea and nausea.   Endocrine: Positive for polydipsia. Negative for polyphagia and polyuria.   Genitourinary:  Negative for difficulty urinating, flank pain and hematuria.   Musculoskeletal:  Negative for arthralgias and neck pain.   Skin:  Negative for pallor and rash.   Allergic/Immunologic: Negative for food allergies.   Neurological:  Positive for headaches. Negative for dizziness, tremors, seizures, syncope, speech difficulty and weakness.   Psychiatric/Behavioral:  Negative for behavioral problems, confusion, decreased concentration, dysphoric mood and hallucinations. The patient is not nervous/anxious.         Medical / Social / Family History     Past Medical History:   Diagnosis Date    Abnormal uterine bleeding 2023    [facility] period. Been bleeding since     Fibroid     Told at ER i have a medium size one    Hypertension 2023    Just started meds    Obesity (BMI 35.0-39.9 without comorbidity)        Past Surgical History:   Procedure Laterality Date    BLADDER REPAIR N/A 10/25/2023    Procedure: REPAIR, BLADDER;  Surgeon: Sophy Milligan MD;  Location: [facility];  Service: OB/GYN;  Laterality: N/A;      SECTION      CHOLECYSTECTOMY      COLPOSCOPY  Not sure    COSMETIC SURGERY  2015 and 2016    abdominoplasty    CYSTOSCOPY N/A 10/25/2023    Procedure: CYSTOSCOPY;  Surgeon: Sophy Milligan MD;  Location: [facility];  Service: OB/GYN;  Laterality: N/A;    HYSTEROSCOPIC POLYPECTOMY OF UTERUS  8/23/2023    Procedure: POLYPECTOMY, UTERUS, HYSTEROSCOPIC;  Surgeon: Sophy Milligan MD;  Location: [facility] OR;  Service: OB/GYN;;  PAP SMEAR    HYSTEROSCOPY WITH DILATION AND CURETTAGE OF UTERUS N/A 8/23/2023    Procedure: HYSTEROSCOPY, WITH DILATION AND CURETTAGE;  Surgeon: Sophy Milligan MD;  Location: [facility] OR;  Service: OB/GYN;  Laterality: N/A;  with myosure    LIPOSUCTION OF ABDOMEN      TONSILLECTOMY      XI ROBOTIC HYSTERECTOMY, WITH SALPINGECTOMY N/A 10/25/2023    Procedure: XI ROBOTIC HYSTERECTOMY,WITH SALPINGECTOMY;  Surgeon: Sophy Milligan MD;  Location: [facility];  Service: OB/GYN;  Laterality: N/A;       Social History  Ms.  reports that she has never smoked. She has never been exposed to tobacco smoke. She has never used smokeless tobacco. She reports that she does not drink alcohol and does not use drugs.    Family History  Ms.'s family history includes Asthma in her mother; Cancer in her maternal grandfather and mother; Diabetes in her father and paternal grandmother; Hypertension in her maternal grandmother and mother.    Medications and Allergies     Medications  No outpatient medications have been marked as taking for the 12/17/24 encounter (Office Visit) with Jamari Stout MD.     Current Facility-Administered Medications for the 12/17/24 encounter (Office Visit) with Jamari Stout MD   Medication Dose Route Frequency Provider Last Rate Last Admin    [COMPLETED] influenza (Flulaval, Fluzone, Fluarix) 45 mcg/0.5 mL IM vaccine (> or = 6 mo) 0.5 mL  0.5 mL Intramuscular 1 time in Clinic/HOD Jamari Stout MD   0.5 mL at 12/17/24 1611       Allergies  Review of patient's allergies  indicates:   Allergen Reactions    Opioids - morphine analogues Nausea And Vomiting       Physical Examination     Vitals:    12/17/24 1549   BP: 113/80   Pulse: 78   Resp: 18   Temp: 98.4 °F (36.9 °C)     Physical Exam  Constitutional:       General: She is not in acute distress.     Appearance: Normal appearance.   HENT:      Head: Normocephalic.      Right Ear: Tympanic membrane and ear canal normal.      Left Ear: Tympanic membrane and ear canal normal.      Nose: Nose normal.      Mouth/Throat:      Mouth: Mucous membranes are moist.      Pharynx: No oropharyngeal exudate.   Eyes:      Extraocular Movements: Extraocular movements intact.      Pupils: Pupils are equal, round, and reactive to light.   Cardiovascular:      Rate and Rhythm: Normal rate and regular rhythm.      Heart sounds: No murmur heard.  Pulmonary:      Effort: Pulmonary effort is normal.      Breath sounds: Normal breath sounds. No wheezing.   Abdominal:      General: Abdomen is flat. Bowel sounds are normal.      Palpations: Abdomen is soft.      Hernia: No hernia is present.   Musculoskeletal:         General: Normal range of motion.      Cervical back: Normal range of motion and neck supple.      Right lower leg: No edema.      Left lower leg: No edema.   Lymphadenopathy:      Cervical: No cervical adenopathy.   Skin:     General: Skin is warm and dry.      Coloration: Skin is not jaundiced.      Findings: No lesion.   Neurological:      General: No focal deficit present.      Mental Status: She is alert and oriented to person, place, and time.      Cranial Nerves: No cranial nerve deficit.      Gait: Gait normal.   Psychiatric:         Mood and Affect: Mood normal.         Behavior: Behavior normal.         Judgment: Judgment normal.          Assessment and Plan (including Health Maintenance)      Problem List  Smart Sets  Document Outside HM   :    Plan:     1. Type 2 diabetes mellitus without complication, without long-term current use  of insulin  The current medical regimen is effective;  continue present plan and medications.  -     Microalbumin/Creatinine Ratio, Urine  -     Hemoglobin A1C  -     tirzepatide (MOUNJARO) 12.5 mg/0.5 mL PnIj; Inject 12.5 mg into the skin every 7 days.  Dispense: 4 Pen; Refill: 2    2. Mild intermittent asthma with acute exacerbation  The current medical regimen is effective;  continue present plan and medications.  -     fluticasone-salmeterol diskus inhaler 250-50 mcg; Inhale 1 puff into the lungs 2 (two) times daily. Controller  Dispense: 180 each; Refill: 3  -     influenza (Flulaval, Fluzone, Fluarix) 45 mcg/0.5 mL IM vaccine (> or = 6 mo) 0.5 mL          Health Maintenance Due   Topic Date Due    Hepatitis C Screening  Never done    Pneumococcal Vaccines (Age 0-64) (1 of 2 - PCV) Never done    HIV Screening  Never done    TETANUS VACCINE  Never done    Colorectal Cancer Screening  Never done    COVID-19 Vaccine (1 - 2024-25 season) Never done    Mammogram  09/28/2024       1. Type 2 diabetes mellitus without complication, without long-term current use of insulin  -     Microalbumin/Creatinine Ratio, Urine  -     Hemoglobin A1C  -     tirzepatide (MOUNJARO) 12.5 mg/0.5 mL PnIj; Inject 12.5 mg into the skin every 7 days.  Dispense: 4 Pen; Refill: 2    2. Mild intermittent asthma with acute exacerbation  -     fluticasone-salmeterol diskus inhaler 250-50 mcg; Inhale 1 puff into the lungs 2 (two) times daily. Controller  Dispense: 180 each; Refill: 3  -     influenza (Flulaval, Fluzone, Fluarix) 45 mcg/0.5 mL IM vaccine (> or = 6 mo) 0.5 mL    3. Primary hypertension         Health Maintenance Topics with due status: Not Due       Topic Last Completion Date    Eye Exam 07/23/2024    Foot Exam 09/03/2024    Lipid Panel 09/03/2024    Diabetes Urine Screening 12/17/2024    Hemoglobin A1c 12/17/2024    RSV Vaccine (Age 60+ and Pregnant patients) Not Due       Future Appointments   Date Time Provider Department Center    1/14/2025  4:20 PM [facility] [place]   2/26/2025  8:30 AM [facility] GI ROOM 03 [facility]        There are no Patient Instructions on file for this visit.  Follow up in about 3 months (around 3/17/2025) for routine followup.     Signature:  Jamari Stout MD      Date of encounter: 12/17/24

## 2025-01-14 ENCOUNTER — HOSPITAL ENCOUNTER (OUTPATIENT)
Dept: RADIOLOGY | Facility: HOSPITAL | Age: 45
Discharge: HOME | End: 2025-01-14
Attending: FAMILY MEDICINE
Payer: OTHER GOVERNMENT

## 2025-01-14 VITALS — WEIGHT: 243 LBS | HEIGHT: 68 IN | BODY MASS INDEX: 36.83 KG/M2

## 2025-01-14 DIAGNOSIS — Z12.31 OTHER SCREENING MAMMOGRAM: ICD-10-CM

## 2025-01-14 PROCEDURE — 77063 BREAST TOMOSYNTHESIS BI: CPT | Mod: TC

## (undated) DEVICE — SOL NACL IRR 3000ML

## (undated) DEVICE — DRESSING TELFA N ADH 3X8

## (undated) DEVICE — NED VARIE INSUFFLAT 14GX150

## (undated) DEVICE — FORCEP FENESTRATED BIPOLAR

## (undated) DEVICE — TROCAR ENDO Z THREAD KII 5X100

## (undated) DEVICE — Device

## (undated) DEVICE — DRAPE ADPT RUMI II ADVINCULA

## (undated) DEVICE — BAG DRAINAGE LEG TWST MED 600M

## (undated) DEVICE — TAPE DRAPE STRIP CLSR 4.5X24IN

## (undated) DEVICE — GLOVE PROTEXIS PI SYN SURG 6.5

## (undated) DEVICE — DEVICE MYOSURE REACH

## (undated) DEVICE — GLOVE 7.0 PROTEXIS PI BLUE

## (undated) DEVICE — SEALER VESSEL EXTEND

## (undated) DEVICE — TUBING INSUFFLATION HEATED

## (undated) DEVICE — GLOVE PROTEXIS PI SYN SURG 7.5

## (undated) DEVICE — SUT GUT CHROMIC 3-0 SH 36IN

## (undated) DEVICE — OCCLUDER COLPO-PNEUMO STERILE

## (undated) DEVICE — DRAPE ARM DAVINCI XI

## (undated) DEVICE — SEAL UNIVERSAL 5MM-8MM XI

## (undated) DEVICE — GLOVE 6.5 PROTEXIS PI BLUE

## (undated) DEVICE — SEAL LENS SCOPE MYOSURE

## (undated) DEVICE — SUT V-LOC GRN 30CM 12IN 2-0

## (undated) DEVICE — GLOVE PROTEXIS PI SYN SURG 8.0

## (undated) DEVICE — GOWN POLY REINF BRTH SLV XL

## (undated) DEVICE — SUT 4-0 VICRYL / FS-2

## (undated) DEVICE — SCISSOR HOT SHEARS XI

## (undated) DEVICE — GLOVE PROTEXIS PI SYN SURG 6.0

## (undated) DEVICE — SOL NACL IRR 1000ML BTL

## (undated) DEVICE — SEALER LIGASURE MARYLAND 37CM

## (undated) DEVICE — TIP RUMI GREEN DISPOSABLE6.7MM

## (undated) DEVICE — KIT PROCEDURE STER INLET CLOSU

## (undated) DEVICE — KIT LITHOTOMY RUSH

## (undated) DEVICE — GLOVE PROTEXIS PI SYN SURG 7

## (undated) DEVICE — EVACUATOR KIT SMOKE PLUME AWAY

## (undated) DEVICE — GAUZE SPONGE XRAY 4X4

## (undated) DEVICE — COVER TIP CURVED SCISSORS XI

## (undated) DEVICE — SUT VICRYL PLUS 1 CTX 36IN

## (undated) DEVICE — WARMER BLUE HEAT SCOPE 3-12MM

## (undated) DEVICE — SET CYSTO IRR DRP CHMBR 84IN

## (undated) DEVICE — NDL DRIVER SUTURECUT MEGA XI

## (undated) DEVICE — COVER PROXIMA MAYO STAND

## (undated) DEVICE — SUT MONOCYRL 4-0 PS2 UND

## (undated) DEVICE — TRAY VAG PREP

## (undated) DEVICE — OBTURATOR BLADELESS 8MM XI CLR

## (undated) DEVICE — TRAY CATH FOL SIL URIMTR 16FR

## (undated) DEVICE — KIT ROBOTIC RUSH